# Patient Record
Sex: FEMALE | Race: WHITE | Employment: OTHER | URBAN - METROPOLITAN AREA
[De-identification: names, ages, dates, MRNs, and addresses within clinical notes are randomized per-mention and may not be internally consistent; named-entity substitution may affect disease eponyms.]

---

## 2017-03-27 ENCOUNTER — OFFICE VISIT (OUTPATIENT)
Dept: INTERNAL MEDICINE CLINIC | Age: 82
End: 2017-03-27

## 2017-03-27 VITALS
WEIGHT: 160 LBS | HEART RATE: 84 BPM | HEIGHT: 63 IN | TEMPERATURE: 98.1 F | SYSTOLIC BLOOD PRESSURE: 120 MMHG | DIASTOLIC BLOOD PRESSURE: 78 MMHG | OXYGEN SATURATION: 99 % | BODY MASS INDEX: 28.35 KG/M2

## 2017-03-27 DIAGNOSIS — M81.0 OSTEOPOROSIS: ICD-10-CM

## 2017-03-27 DIAGNOSIS — M62.838 SPASM OF MUSCLE: ICD-10-CM

## 2017-03-27 DIAGNOSIS — Z23 NEED FOR PNEUMOCOCCAL VACCINATION: ICD-10-CM

## 2017-03-27 DIAGNOSIS — M19.90 ARTHRITIS: ICD-10-CM

## 2017-03-27 DIAGNOSIS — E78.2 MIXED HYPERLIPIDEMIA: Primary | ICD-10-CM

## 2017-03-27 PROCEDURE — 1123F ACP DISCUSS/DSCN MKR DOCD: CPT | Performed by: NURSE PRACTITIONER

## 2017-03-27 PROCEDURE — 1090F PRES/ABSN URINE INCON ASSESS: CPT | Performed by: NURSE PRACTITIONER

## 2017-03-27 PROCEDURE — 4005F PHARM THX FOR OP RXD: CPT | Performed by: NURSE PRACTITIONER

## 2017-03-27 PROCEDURE — 99214 OFFICE O/P EST MOD 30 MIN: CPT | Performed by: NURSE PRACTITIONER

## 2017-03-27 PROCEDURE — 1036F TOBACCO NON-USER: CPT | Performed by: NURSE PRACTITIONER

## 2017-03-27 PROCEDURE — G0009 ADMIN PNEUMOCOCCAL VACCINE: HCPCS | Performed by: NURSE PRACTITIONER

## 2017-03-27 PROCEDURE — G8420 CALC BMI NORM PARAMETERS: HCPCS | Performed by: NURSE PRACTITIONER

## 2017-03-27 PROCEDURE — G8399 PT W/DXA RESULTS DOCUMENT: HCPCS | Performed by: NURSE PRACTITIONER

## 2017-03-27 PROCEDURE — 4040F PNEUMOC VAC/ADMIN/RCVD: CPT | Performed by: NURSE PRACTITIONER

## 2017-03-27 PROCEDURE — G8484 FLU IMMUNIZE NO ADMIN: HCPCS | Performed by: NURSE PRACTITIONER

## 2017-03-27 PROCEDURE — 90732 PPSV23 VACC 2 YRS+ SUBQ/IM: CPT | Performed by: NURSE PRACTITIONER

## 2017-03-27 PROCEDURE — G8427 DOCREV CUR MEDS BY ELIG CLIN: HCPCS | Performed by: NURSE PRACTITIONER

## 2017-03-27 RX ORDER — ALENDRONATE SODIUM 70 MG/1
TABLET ORAL
Qty: 12 TABLET | Refills: 3 | Status: SHIPPED | OUTPATIENT
Start: 2017-03-27 | End: 2018-01-25 | Stop reason: SDUPTHER

## 2017-03-27 RX ORDER — ATORVASTATIN CALCIUM 10 MG/1
10 TABLET, FILM COATED ORAL DAILY
Qty: 90 TABLET | Refills: 3 | Status: SHIPPED | OUTPATIENT
Start: 2017-03-27 | End: 2018-01-25 | Stop reason: SDUPTHER

## 2017-03-27 RX ORDER — CLONAZEPAM 0.5 MG/1
0.5 TABLET ORAL NIGHTLY PRN
Qty: 30 TABLET | Refills: 0 | Status: SHIPPED | OUTPATIENT
Start: 2017-03-27 | End: 2017-08-18 | Stop reason: SDUPTHER

## 2017-03-29 ENCOUNTER — HOSPITAL ENCOUNTER (OUTPATIENT)
Dept: GENERAL RADIOLOGY | Age: 82
Discharge: HOME | End: 2017-03-29
Attending: NURSE PRACTITIONER | Admitting: NURSE PRACTITIONER

## 2017-03-29 DIAGNOSIS — E78.2 MIXED HYPERLIPIDEMIA: ICD-10-CM

## 2017-03-29 LAB
A/G RATIO: 1.4 (ref 1.1–2.2)
ALBUMIN SERPL-MCNC: 4.1 G/DL (ref 3.4–5)
ALP BLD-CCNC: 109 U/L (ref 40–129)
ALT SERPL-CCNC: 21 U/L (ref 10–40)
ANION GAP SERPL CALCULATED.3IONS-SCNC: 13 MMOL/L (ref 3–16)
AST SERPL-CCNC: 25 U/L (ref 15–37)
BILIRUB SERPL-MCNC: 0.4 MG/DL (ref 0–1)
BUN BLDV-MCNC: 14 MG/DL (ref 7–20)
CALCIUM SERPL-MCNC: 9.4 MG/DL (ref 8.3–10.6)
CHLORIDE BLD-SCNC: 102 MMOL/L (ref 99–110)
CHOLESTEROL, TOTAL: 173 MG/DL (ref 0–199)
CO2: 27 MMOL/L (ref 21–32)
CREAT SERPL-MCNC: 0.5 MG/DL (ref 0.6–1.2)
GFR AFRICAN AMERICAN: >60
GFR NON-AFRICAN AMERICAN: >60
GLOBULIN: 3 G/DL
GLUCOSE BLD-MCNC: 94 MG/DL (ref 70–99)
HDLC SERPL-MCNC: 83 MG/DL (ref 40–60)
LDL CHOLESTEROL CALCULATED: 70 MG/DL
POTASSIUM SERPL-SCNC: 4.3 MMOL/L (ref 3.5–5.1)
SODIUM BLD-SCNC: 142 MMOL/L (ref 136–145)
TOTAL PROTEIN: 7.1 G/DL (ref 6.4–8.2)
TRIGL SERPL-MCNC: 102 MG/DL (ref 0–150)
TSH SERPL DL<=0.05 MIU/L-ACNC: 3.4 UIU/ML (ref 0.27–4.2)
VLDLC SERPL CALC-MCNC: 20 MG/DL

## 2017-08-18 DIAGNOSIS — M62.838 SPASM OF MUSCLE: ICD-10-CM

## 2017-08-18 RX ORDER — CLONAZEPAM 0.5 MG/1
TABLET ORAL
Qty: 30 TABLET | Refills: 0 | Status: SHIPPED | OUTPATIENT
Start: 2017-08-18 | End: 2018-01-25 | Stop reason: SDUPTHER

## 2017-10-06 ENCOUNTER — IMMUNIZATION (OUTPATIENT)
Dept: INTERNAL MEDICINE CLINIC | Age: 82
End: 2017-10-06

## 2017-10-06 DIAGNOSIS — Z23 NEED FOR PROPHYLACTIC VACCINATION AND INOCULATION AGAINST INFLUENZA: Primary | ICD-10-CM

## 2017-10-06 PROCEDURE — 90662 IIV NO PRSV INCREASED AG IM: CPT | Performed by: NURSE PRACTITIONER

## 2017-10-06 PROCEDURE — G0008 ADMIN INFLUENZA VIRUS VAC: HCPCS | Performed by: NURSE PRACTITIONER

## 2017-11-14 ENCOUNTER — TELEPHONE (OUTPATIENT)
Dept: INTERNAL MEDICINE CLINIC | Age: 82
End: 2017-11-14

## 2017-11-14 NOTE — TELEPHONE ENCOUNTER
Hamlet Gunter, spouse, calling to see what should be done for Yong Wing???  Took her to the ER yesterday for shaking; patient can't seem to get warm. Today, he says that Yong Wing has trouble moving, aching all over, and generally weak. No appointments available with any providers; please call patient at [phone] to discuss.

## 2017-11-15 ENCOUNTER — TELEPHONE (OUTPATIENT)
Dept: INTERNAL MEDICINE CLINIC | Age: 82
End: 2017-11-15

## 2017-11-15 NOTE — TELEPHONE ENCOUNTER
Patient would like to be seen asap: katt, [facility], 11/13, couldn't find anything wrong but patient is still weak, can barely walk, bones hurt, shaking, feels clammy. Would you be able to fit her in somewhere? Please also route to [place].

## 2017-11-16 ENCOUNTER — OFFICE VISIT (OUTPATIENT)
Dept: INTERNAL MEDICINE CLINIC | Age: 82
End: 2017-11-16

## 2017-11-16 VITALS
HEIGHT: 64 IN | BODY MASS INDEX: 26.46 KG/M2 | SYSTOLIC BLOOD PRESSURE: 124 MMHG | DIASTOLIC BLOOD PRESSURE: 66 MMHG | WEIGHT: 155 LBS | OXYGEN SATURATION: 96 % | TEMPERATURE: 98.6 F | HEART RATE: 72 BPM

## 2017-11-16 DIAGNOSIS — R53.83 FATIGUE, UNSPECIFIED TYPE: ICD-10-CM

## 2017-11-16 DIAGNOSIS — R63.0 DECREASE IN APPETITE: ICD-10-CM

## 2017-11-16 DIAGNOSIS — R50.9 FEVER, UNSPECIFIED FEVER CAUSE: ICD-10-CM

## 2017-11-16 DIAGNOSIS — Z09 HOSPITAL DISCHARGE FOLLOW-UP: Primary | ICD-10-CM

## 2017-11-16 PROCEDURE — 1036F TOBACCO NON-USER: CPT | Performed by: NURSE PRACTITIONER

## 2017-11-16 PROCEDURE — 1123F ACP DISCUSS/DSCN MKR DOCD: CPT | Performed by: NURSE PRACTITIONER

## 2017-11-16 PROCEDURE — 1090F PRES/ABSN URINE INCON ASSESS: CPT | Performed by: NURSE PRACTITIONER

## 2017-11-16 PROCEDURE — 99214 OFFICE O/P EST MOD 30 MIN: CPT | Performed by: NURSE PRACTITIONER

## 2017-11-16 PROCEDURE — G8484 FLU IMMUNIZE NO ADMIN: HCPCS | Performed by: NURSE PRACTITIONER

## 2017-11-16 PROCEDURE — 4040F PNEUMOC VAC/ADMIN/RCVD: CPT | Performed by: NURSE PRACTITIONER

## 2017-11-16 PROCEDURE — G8419 CALC BMI OUT NRM PARAM NOF/U: HCPCS | Performed by: NURSE PRACTITIONER

## 2017-11-16 PROCEDURE — G8399 PT W/DXA RESULTS DOCUMENT: HCPCS | Performed by: NURSE PRACTITIONER

## 2017-11-16 PROCEDURE — G8427 DOCREV CUR MEDS BY ELIG CLIN: HCPCS | Performed by: NURSE PRACTITIONER

## 2017-11-16 ASSESSMENT — ENCOUNTER SYMPTOMS
ABDOMINAL PAIN: 0
COUGH: 0
COLOR CHANGE: 0
CONSTIPATION: 0
BLOOD IN STOOL: 0
SHORTNESS OF BREATH: 0
NAUSEA: 1

## 2017-11-16 NOTE — PROGRESS NOTES
11/16/17    Eddie Abbott  80 y.o.  female    Chief Complaint   Patient presents with    Follow-Up from Hospital       Chills,Shaking,Weakness         HPI:   Pt presents for ER FU. On 11/13/17 experienced full body shaking, tremors, felt cold and had fatigue for about 2 hours. Was evaluated in the ER, with results unremarkable. She continues to have mild tremors, poor appetite and fatigue. Fever has been 100.3 for which she has taken tylenol. She has not taken tylenol today. She describes body aches throughout. /66   Pulse 72   Temp 98.6 °F (37 °C) (Oral)   Ht 5' 3.5\" (1.613 m)   Wt 155 lb (70.3 kg)   SpO2 96%   BMI 27.03 kg/m²     Current Outpatient Prescriptions   Medication Sig Dispense Refill    clonazePAM (KLONOPIN) 0.5 MG tablet TAKE 1 TABLET BY MOUTH NIGHTLY AS NEEDED (CRAMPS) 30 tablet 0    aspirin 81 MG tablet Take 81 mg by mouth daily      alendronate (FOSAMAX) 70 MG tablet TAKE 1 TABLET EVERY 7 DAYS 12 tablet 3    atorvastatin (LIPITOR) 10 MG tablet Take 1 tablet by mouth daily 90 tablet 3    Multiple Vitamins-Minerals (ICAPS) TABS Take  by mouth.  calcium carbonate (OSCAL) 500 MG TABS tablet Take 1,200 mg by mouth daily. No current facility-administered medications for this visit. Social History     Social History    Marital status:      Spouse name: N/A    Number of children: N/A    Years of education: N/A     Occupational History    Not on file.      Social History Main Topics    Smoking status: Never Smoker    Smokeless tobacco: Never Used    Alcohol use Yes      Comment: occasional    Drug use: No    Sexual activity: Not on file     Other Topics Concern    Not on file     Social History Narrative    No narrative on file       Family History   Problem Relation Age of Onset    Heart Defect Mother     Prostate Cancer Father        Past Medical History:   Diagnosis Date    Arthritis     Cataract     H/O colonoscopy     Hyperlipidemia  Osteoporosis        Review of Systems   Constitutional: Positive for activity change, appetite change, chills and fever. HENT: Negative. Respiratory: Negative for cough and shortness of breath. Cardiovascular: Negative for chest pain and palpitations. Gastrointestinal: Positive for nausea. Negative for abdominal pain, blood in stool and constipation. Genitourinary: Negative for difficulty urinating. Musculoskeletal: Negative. Skin: Negative. Negative for color change and rash. Neurological: Negative for weakness. Psychiatric/Behavioral: Negative for agitation and confusion. Physical Exam   Constitutional: She is oriented to person, place, and time. She appears well-developed and well-nourished. No distress. Sitting on the exam table with tremors/shaking noted throughout exam.  Does not appear to feel well. HENT:   Head: Normocephalic and atraumatic. Right Ear: External ear normal.   Left Ear: External ear normal.   Mouth/Throat: Oropharynx is clear and moist. No oropharyngeal exudate. Eyes: Conjunctivae are normal. Right eye exhibits no discharge. Left eye exhibits no discharge. No scleral icterus. Neck: Normal range of motion. Cardiovascular: Normal rate, regular rhythm, normal heart sounds and intact distal pulses. No murmur heard. Pulmonary/Chest: Effort normal and breath sounds normal. She has no wheezes. Abdominal: Soft. Bowel sounds are normal. She exhibits no distension and no mass. There is no tenderness. Musculoskeletal: Normal range of motion. She exhibits no edema or tenderness. Lymphadenopathy:     She has no cervical adenopathy. Neurological: She is alert and oriented to person, place, and time. Skin: Skin is warm and dry. No rash noted. She is not diaphoretic. No erythema. Psychiatric: She has a normal mood and affect. Her behavior is normal.       Encounter Diagnoses   Name Primary?    [facility] discharge follow-up Yes    Fever, unspecified

## 2017-11-16 NOTE — PATIENT INSTRUCTIONS
Eat small amounts of food frequently  Eat easy to digest foods--crackers, canned fruit, applesauce, soft baked potato with butter  Tylenol or ibuprofen for comfort  If symptoms get worse in any way return to the office or the ER  It may take 7-10 days to get through this episode

## 2018-01-25 ENCOUNTER — OFFICE VISIT (OUTPATIENT)
Dept: INTERNAL MEDICINE CLINIC | Age: 83
End: 2018-01-25

## 2018-01-25 VITALS
HEIGHT: 64 IN | DIASTOLIC BLOOD PRESSURE: 64 MMHG | SYSTOLIC BLOOD PRESSURE: 114 MMHG | WEIGHT: 158 LBS | HEART RATE: 79 BPM | BODY MASS INDEX: 26.98 KG/M2 | OXYGEN SATURATION: 97 % | RESPIRATION RATE: 16 BRPM

## 2018-01-25 DIAGNOSIS — E78.2 MIXED HYPERLIPIDEMIA: ICD-10-CM

## 2018-01-25 DIAGNOSIS — M81.0 OSTEOPOROSIS, UNSPECIFIED OSTEOPOROSIS TYPE, UNSPECIFIED PATHOLOGICAL FRACTURE PRESENCE: ICD-10-CM

## 2018-01-25 DIAGNOSIS — M62.838 SPASM OF MUSCLE: ICD-10-CM

## 2018-01-25 PROCEDURE — G8427 DOCREV CUR MEDS BY ELIG CLIN: HCPCS | Performed by: INTERNAL MEDICINE

## 2018-01-25 PROCEDURE — G8484 FLU IMMUNIZE NO ADMIN: HCPCS | Performed by: INTERNAL MEDICINE

## 2018-01-25 PROCEDURE — 1123F ACP DISCUSS/DSCN MKR DOCD: CPT | Performed by: INTERNAL MEDICINE

## 2018-01-25 PROCEDURE — 4040F PNEUMOC VAC/ADMIN/RCVD: CPT | Performed by: INTERNAL MEDICINE

## 2018-01-25 PROCEDURE — 1090F PRES/ABSN URINE INCON ASSESS: CPT | Performed by: INTERNAL MEDICINE

## 2018-01-25 PROCEDURE — 99214 OFFICE O/P EST MOD 30 MIN: CPT | Performed by: INTERNAL MEDICINE

## 2018-01-25 PROCEDURE — 1036F TOBACCO NON-USER: CPT | Performed by: INTERNAL MEDICINE

## 2018-01-25 PROCEDURE — G8419 CALC BMI OUT NRM PARAM NOF/U: HCPCS | Performed by: INTERNAL MEDICINE

## 2018-01-25 PROCEDURE — G8399 PT W/DXA RESULTS DOCUMENT: HCPCS | Performed by: INTERNAL MEDICINE

## 2018-01-25 RX ORDER — ALENDRONATE SODIUM 70 MG/1
TABLET ORAL
Qty: 12 TABLET | Refills: 3 | Status: SHIPPED | OUTPATIENT
Start: 2018-01-25 | End: 2018-11-08 | Stop reason: SDUPTHER

## 2018-01-25 RX ORDER — CLONAZEPAM 0.5 MG/1
TABLET ORAL
Qty: 30 TABLET | Refills: 0 | Status: SHIPPED | OUTPATIENT
Start: 2018-01-25 | End: 2018-04-26 | Stop reason: ALTCHOICE

## 2018-01-25 RX ORDER — ATORVASTATIN CALCIUM 10 MG/1
10 TABLET, FILM COATED ORAL DAILY
Qty: 90 TABLET | Refills: 3 | Status: SHIPPED | OUTPATIENT
Start: 2018-01-25 | End: 2018-11-08 | Stop reason: SDUPTHER

## 2018-01-25 ASSESSMENT — PATIENT HEALTH QUESTIONNAIRE - PHQ9
1. LITTLE INTEREST OR PLEASURE IN DOING THINGS: 0
SUM OF ALL RESPONSES TO PHQ9 QUESTIONS 1 & 2: 0
SUM OF ALL RESPONSES TO PHQ QUESTIONS 1-9: 0
2. FEELING DOWN, DEPRESSED OR HOPELESS: 0

## 2018-01-25 ASSESSMENT — ENCOUNTER SYMPTOMS
VOMITING: 0
NAUSEA: 0
CHEST TIGHTNESS: 0
BACK PAIN: 0
COUGH: 0
DIARRHEA: 0
WHEEZING: 0
ABDOMINAL DISTENTION: 0
CONSTIPATION: 0
SHORTNESS OF BREATH: 0

## 2018-01-25 NOTE — PROGRESS NOTES
Subjective:      Patient ID: Viridiana Miller is a 80 y.o. female. HPI     Here for a check up. Has h/o osteoporosis, hyperlipidemia and muscle spasms. Patient feels well. Gets muscle spasms daily. States she drinks lots of water including tonic water. Clonazepam helps relieve her symptoms. Lipids have improved since starting cholesterol medication. Denies SE to alendronate. Sits up for 45 minutes after taking the medication    Review of Systems   Constitutional: Negative for unexpected weight change. Respiratory: Negative for cough, chest tightness, shortness of breath and wheezing. Cardiovascular: Negative for chest pain, palpitations and leg swelling. Gastrointestinal: Negative for abdominal distention, constipation, diarrhea, nausea and vomiting. Musculoskeletal: Negative for arthralgias, back pain and myalgias. Muscle spasm   Neurological: Negative for tremors and numbness. All other systems reviewed and are negative. Objective:   Physical Exam   Constitutional: She is oriented to person, place, and time. She appears well-developed and well-nourished. No distress. HENT:   Right Ear: External ear normal.   Left Ear: External ear normal.   Mouth/Throat: Oropharynx is clear and moist. No oropharyngeal exudate. Neck: Normal range of motion. Neck supple. No thyromegaly present. Cardiovascular: Normal rate, regular rhythm, normal heart sounds and intact distal pulses. Pulmonary/Chest: Effort normal and breath sounds normal. No respiratory distress. She has no wheezes. She has no rales. She exhibits no tenderness. Abdominal: Soft. She exhibits no distension and no mass. There is no tenderness. There is no rebound and no guarding. Musculoskeletal: She exhibits no edema. Neurological: She is alert and oriented to person, place, and time. Skin: She is not diaphoretic. Psychiatric: She has a normal mood and affect.  Her behavior is normal. Judgment and thought content normal.

## 2018-01-26 ENCOUNTER — HOSPITAL ENCOUNTER (OUTPATIENT)
Dept: GENERAL RADIOLOGY | Age: 83
Discharge: HOME | End: 2018-01-26
Attending: INTERNAL MEDICINE | Admitting: INTERNAL MEDICINE

## 2018-01-26 DIAGNOSIS — M62.838 SPASM OF MUSCLE: ICD-10-CM

## 2018-01-26 DIAGNOSIS — E78.2 MIXED HYPERLIPIDEMIA: ICD-10-CM

## 2018-01-26 LAB
A/G RATIO: 1.4 (ref 1.1–2.2)
ALBUMIN SERPL-MCNC: 4.2 G/DL (ref 3.4–5)
ALP BLD-CCNC: 102 U/L (ref 40–129)
ALT SERPL-CCNC: 18 U/L (ref 10–40)
ANION GAP SERPL CALCULATED.3IONS-SCNC: 11 MMOL/L (ref 3–16)
AST SERPL-CCNC: 27 U/L (ref 15–37)
BILIRUB SERPL-MCNC: 0.5 MG/DL (ref 0–1)
BUN BLDV-MCNC: 11 MG/DL (ref 7–20)
CALCIUM SERPL-MCNC: 9.3 MG/DL (ref 8.3–10.6)
CHLORIDE BLD-SCNC: 106 MMOL/L (ref 99–110)
CHOLESTEROL, TOTAL: 163 MG/DL (ref 0–199)
CO2: 28 MMOL/L (ref 21–32)
CREAT SERPL-MCNC: 0.5 MG/DL (ref 0.6–1.2)
GFR AFRICAN AMERICAN: >60
GFR NON-AFRICAN AMERICAN: >60
GLOBULIN: 3.1 G/DL
GLUCOSE BLD-MCNC: 77 MG/DL (ref 70–99)
HDLC SERPL-MCNC: 86 MG/DL (ref 40–60)
LDL CHOLESTEROL CALCULATED: 59 MG/DL
POTASSIUM SERPL-SCNC: 4.4 MMOL/L (ref 3.5–5.1)
SODIUM BLD-SCNC: 145 MMOL/L (ref 136–145)
TOTAL PROTEIN: 7.3 G/DL (ref 6.4–8.2)
TRIGL SERPL-MCNC: 91 MG/DL (ref 0–150)
VLDLC SERPL CALC-MCNC: 18 MG/DL

## 2018-04-26 ENCOUNTER — OFFICE VISIT (OUTPATIENT)
Dept: INTERNAL MEDICINE CLINIC | Age: 83
End: 2018-04-26

## 2018-04-26 VITALS
BODY MASS INDEX: 26.5 KG/M2 | SYSTOLIC BLOOD PRESSURE: 128 MMHG | HEART RATE: 85 BPM | OXYGEN SATURATION: 98 % | WEIGHT: 152 LBS | DIASTOLIC BLOOD PRESSURE: 78 MMHG

## 2018-04-26 DIAGNOSIS — E78.49 OTHER HYPERLIPIDEMIA: ICD-10-CM

## 2018-04-26 DIAGNOSIS — R25.2 LEG CRAMPS: Primary | ICD-10-CM

## 2018-04-26 PROCEDURE — G8419 CALC BMI OUT NRM PARAM NOF/U: HCPCS | Performed by: INTERNAL MEDICINE

## 2018-04-26 PROCEDURE — 1036F TOBACCO NON-USER: CPT | Performed by: INTERNAL MEDICINE

## 2018-04-26 PROCEDURE — 4040F PNEUMOC VAC/ADMIN/RCVD: CPT | Performed by: INTERNAL MEDICINE

## 2018-04-26 PROCEDURE — G8399 PT W/DXA RESULTS DOCUMENT: HCPCS | Performed by: INTERNAL MEDICINE

## 2018-04-26 PROCEDURE — 1090F PRES/ABSN URINE INCON ASSESS: CPT | Performed by: INTERNAL MEDICINE

## 2018-04-26 PROCEDURE — 99213 OFFICE O/P EST LOW 20 MIN: CPT | Performed by: INTERNAL MEDICINE

## 2018-04-26 PROCEDURE — G8427 DOCREV CUR MEDS BY ELIG CLIN: HCPCS | Performed by: INTERNAL MEDICINE

## 2018-04-26 PROCEDURE — 1123F ACP DISCUSS/DSCN MKR DOCD: CPT | Performed by: INTERNAL MEDICINE

## 2018-04-26 ASSESSMENT — ENCOUNTER SYMPTOMS
ABDOMINAL DISTENTION: 0
BACK PAIN: 0
CONSTIPATION: 0
DIARRHEA: 0
CHEST TIGHTNESS: 0
SHORTNESS OF BREATH: 0
VOMITING: 0
WHEEZING: 0
NAUSEA: 0
COUGH: 0

## 2018-10-05 ENCOUNTER — NURSE ONLY (OUTPATIENT)
Dept: INTERNAL MEDICINE CLINIC | Age: 83
End: 2018-10-05
Payer: MEDICARE

## 2018-10-05 DIAGNOSIS — Z23 NEED FOR PROPHYLACTIC VACCINATION AND INOCULATION AGAINST INFLUENZA: Primary | ICD-10-CM

## 2018-10-05 PROCEDURE — G0008 ADMIN INFLUENZA VIRUS VAC: HCPCS | Performed by: INTERNAL MEDICINE

## 2018-10-05 PROCEDURE — 90662 IIV NO PRSV INCREASED AG IM: CPT | Performed by: INTERNAL MEDICINE

## 2018-10-30 ENCOUNTER — HOSPITAL ENCOUNTER (OUTPATIENT)
Age: 83
Discharge: HOME | End: 2018-10-30
Payer: MEDICARE

## 2018-10-30 DIAGNOSIS — E78.49 OTHER HYPERLIPIDEMIA: ICD-10-CM

## 2018-10-30 LAB
ALBUMIN SERPL-MCNC: 4.3 G/DL (ref 3.4–5)
ALP BLD-CCNC: 87 U/L (ref 40–129)
ALT SERPL-CCNC: 18 U/L (ref 10–40)
AST SERPL-CCNC: 26 U/L (ref 15–37)
BILIRUB SERPL-MCNC: 0.5 MG/DL (ref 0–1)
BILIRUBIN DIRECT: <0.2 MG/DL (ref 0–0.3)
BILIRUBIN, INDIRECT: NORMAL MG/DL (ref 0–1)
CHOLESTEROL, TOTAL: 172 MG/DL (ref 0–199)
HDLC SERPL-MCNC: 75 MG/DL (ref 40–60)
LDL CHOLESTEROL CALCULATED: 78 MG/DL
TOTAL PROTEIN: 7.1 G/DL (ref 6.4–8.2)
TRIGL SERPL-MCNC: 94 MG/DL (ref 0–150)
VLDLC SERPL CALC-MCNC: 19 MG/DL

## 2018-10-30 PROCEDURE — 80061 LIPID PANEL: CPT

## 2018-10-30 PROCEDURE — 36415 COLL VENOUS BLD VENIPUNCTURE: CPT

## 2018-10-30 PROCEDURE — 80076 HEPATIC FUNCTION PANEL: CPT

## 2018-11-08 ENCOUNTER — OFFICE VISIT (OUTPATIENT)
Dept: INTERNAL MEDICINE CLINIC | Age: 83
End: 2018-11-08
Payer: MEDICARE

## 2018-11-08 VITALS
BODY MASS INDEX: 27.02 KG/M2 | OXYGEN SATURATION: 98 % | DIASTOLIC BLOOD PRESSURE: 70 MMHG | WEIGHT: 155 LBS | SYSTOLIC BLOOD PRESSURE: 118 MMHG | HEART RATE: 78 BPM

## 2018-11-08 DIAGNOSIS — M81.0 OSTEOPOROSIS, UNSPECIFIED OSTEOPOROSIS TYPE, UNSPECIFIED PATHOLOGICAL FRACTURE PRESENCE: ICD-10-CM

## 2018-11-08 DIAGNOSIS — M62.838 MUSCLE SPASM: Primary | ICD-10-CM

## 2018-11-08 DIAGNOSIS — E78.2 MIXED HYPERLIPIDEMIA: ICD-10-CM

## 2018-11-08 PROCEDURE — 99214 OFFICE O/P EST MOD 30 MIN: CPT | Performed by: INTERNAL MEDICINE

## 2018-11-08 PROCEDURE — G8399 PT W/DXA RESULTS DOCUMENT: HCPCS | Performed by: INTERNAL MEDICINE

## 2018-11-08 PROCEDURE — G8419 CALC BMI OUT NRM PARAM NOF/U: HCPCS | Performed by: INTERNAL MEDICINE

## 2018-11-08 PROCEDURE — 1036F TOBACCO NON-USER: CPT | Performed by: INTERNAL MEDICINE

## 2018-11-08 PROCEDURE — 1090F PRES/ABSN URINE INCON ASSESS: CPT | Performed by: INTERNAL MEDICINE

## 2018-11-08 PROCEDURE — 1101F PT FALLS ASSESS-DOCD LE1/YR: CPT | Performed by: INTERNAL MEDICINE

## 2018-11-08 PROCEDURE — 4040F PNEUMOC VAC/ADMIN/RCVD: CPT | Performed by: INTERNAL MEDICINE

## 2018-11-08 PROCEDURE — G8427 DOCREV CUR MEDS BY ELIG CLIN: HCPCS | Performed by: INTERNAL MEDICINE

## 2018-11-08 PROCEDURE — G8482 FLU IMMUNIZE ORDER/ADMIN: HCPCS | Performed by: INTERNAL MEDICINE

## 2018-11-08 PROCEDURE — 1123F ACP DISCUSS/DSCN MKR DOCD: CPT | Performed by: INTERNAL MEDICINE

## 2018-11-08 RX ORDER — ATORVASTATIN CALCIUM 10 MG/1
10 TABLET, FILM COATED ORAL DAILY
Qty: 90 TABLET | Refills: 3 | Status: SHIPPED | OUTPATIENT
Start: 2018-11-08 | End: 2020-02-14

## 2018-11-08 RX ORDER — ALENDRONATE SODIUM 70 MG/1
TABLET ORAL
Qty: 12 TABLET | Refills: 3 | Status: SHIPPED | OUTPATIENT
Start: 2018-11-08 | End: 2020-02-14

## 2018-11-08 NOTE — PROGRESS NOTES
reviewed. Assessment:      Kwaku Tran was seen today for annual exam.    Diagnoses and all orders for this visit:    Mixed hyperlipidemia  Improved. Continue current regimen    Osteoporosis, unspecified osteoporosis type, unspecified pathological fracture presence  Stable. Continue current regimen. -     Spasm of muscle  Improved.  Continue current regimen          Plan:      See above plan

## 2019-01-10 NOTE — TELEPHONE ENCOUNTER
Spoke with patient's  who states pt is no worse than when seen at ED. She is still very cold (3 blankets on her) and shaky. She is urinating ok, showered this morning. Denies slurred speech. Feels fatigued. Jyoti Mishra will assess patient 11/16/17 and call me if any questions. The patient is a 38y Female complaining of

## 2019-03-22 ENCOUNTER — NURSE ONLY (OUTPATIENT)
Dept: INTERNAL MEDICINE CLINIC | Age: 84
End: 2019-03-22
Payer: MEDICARE

## 2019-03-22 PROCEDURE — 90750 HZV VACC RECOMBINANT IM: CPT | Performed by: NURSE PRACTITIONER

## 2019-03-22 PROCEDURE — 90471 IMMUNIZATION ADMIN: CPT | Performed by: NURSE PRACTITIONER

## 2019-04-22 ENCOUNTER — APPOINTMENT (OUTPATIENT)
Dept: CT IMAGING | Age: 84
End: 2019-04-22
Payer: MEDICARE

## 2019-04-22 ENCOUNTER — HOSPITAL ENCOUNTER (EMERGENCY)
Age: 84
Discharge: HOME | End: 2019-04-22
Attending: EMERGENCY MEDICINE
Payer: MEDICARE

## 2019-04-22 ENCOUNTER — APPOINTMENT (OUTPATIENT)
Dept: GENERAL RADIOLOGY | Age: 84
End: 2019-04-22
Payer: MEDICARE

## 2019-04-22 VITALS
OXYGEN SATURATION: 95 % | DIASTOLIC BLOOD PRESSURE: 61 MMHG | TEMPERATURE: 98.6 F | HEIGHT: 64 IN | HEART RATE: 71 BPM | SYSTOLIC BLOOD PRESSURE: 128 MMHG | RESPIRATION RATE: 14 BRPM | WEIGHT: 154 LBS | BODY MASS INDEX: 26.29 KG/M2

## 2019-04-22 DIAGNOSIS — M48.54XA: Primary | ICD-10-CM

## 2019-04-22 LAB
A/G RATIO: 1.5 (ref 1.1–2.2)
ALBUMIN SERPL-MCNC: 4.3 G/DL (ref 3.4–5)
ALP BLD-CCNC: 100 U/L (ref 40–129)
ALT SERPL-CCNC: 16 U/L (ref 10–40)
ANION GAP SERPL CALCULATED.3IONS-SCNC: 12 MMOL/L (ref 3–16)
AST SERPL-CCNC: 25 U/L (ref 15–37)
BASOPHILS ABSOLUTE: 0.1 K/UL (ref 0–0.2)
BASOPHILS RELATIVE PERCENT: 1.1 %
BILIRUB SERPL-MCNC: 0.6 MG/DL (ref 0–1)
BUN BLDV-MCNC: 15 MG/DL (ref 7–20)
CALCIUM SERPL-MCNC: 9.3 MG/DL (ref 8.3–10.6)
CHLORIDE BLD-SCNC: 104 MMOL/L (ref 99–110)
CO2: 25 MMOL/L (ref 21–32)
CREAT SERPL-MCNC: <0.5 MG/DL (ref 0.6–1.2)
EKG ATRIAL RATE: 70 BPM
EKG DIAGNOSIS: NORMAL
EKG P AXIS: 70 DEGREES
EKG P-R INTERVAL: 154 MS
EKG Q-T INTERVAL: 418 MS
EKG QRS DURATION: 90 MS
EKG QTC CALCULATION (BAZETT): 451 MS
EKG R AXIS: 18 DEGREES
EKG T AXIS: 68 DEGREES
EKG VENTRICULAR RATE: 70 BPM
EOSINOPHILS ABSOLUTE: 0 K/UL (ref 0–0.6)
EOSINOPHILS RELATIVE PERCENT: 0.8 %
GFR AFRICAN AMERICAN: >60
GFR NON-AFRICAN AMERICAN: >60
GLOBULIN: 2.9 G/DL
GLUCOSE BLD-MCNC: 107 MG/DL (ref 70–99)
HCT VFR BLD CALC: 41.7 % (ref 36–48)
HEMOGLOBIN: 14.1 G/DL (ref 12–16)
INR BLD: 1.04 (ref 0.86–1.14)
LYMPHOCYTES ABSOLUTE: 1.6 K/UL (ref 1–5.1)
LYMPHOCYTES RELATIVE PERCENT: 27.4 %
MCH RBC QN AUTO: 31.3 PG (ref 26–34)
MCHC RBC AUTO-ENTMCNC: 33.7 G/DL (ref 31–36)
MCV RBC AUTO: 93.1 FL (ref 80–100)
MONOCYTES ABSOLUTE: 0.8 K/UL (ref 0–1.3)
MONOCYTES RELATIVE PERCENT: 13.2 %
NEUTROPHILS ABSOLUTE: 3.3 K/UL (ref 1.7–7.7)
NEUTROPHILS RELATIVE PERCENT: 57.5 %
PDW BLD-RTO: 14.8 % (ref 12.4–15.4)
PLATELET # BLD: 182 K/UL (ref 135–450)
PMV BLD AUTO: 8.2 FL (ref 5–10.5)
POTASSIUM SERPL-SCNC: 3.7 MMOL/L (ref 3.5–5.1)
PRO-BNP: 264 PG/ML (ref 0–449)
PROTHROMBIN TIME: 11.9 SEC (ref 9.8–13)
RBC # BLD: 4.48 M/UL (ref 4–5.2)
SODIUM BLD-SCNC: 141 MMOL/L (ref 136–145)
TOTAL PROTEIN: 7.2 G/DL (ref 6.4–8.2)
TROPONIN: <0.01 NG/ML
WBC # BLD: 5.7 K/UL (ref 4–11)

## 2019-04-22 PROCEDURE — 85610 PROTHROMBIN TIME: CPT

## 2019-04-22 PROCEDURE — 6360000002 HC RX W HCPCS: Performed by: NURSE PRACTITIONER

## 2019-04-22 PROCEDURE — 6370000000 HC RX 637 (ALT 250 FOR IP): Performed by: NURSE PRACTITIONER

## 2019-04-22 PROCEDURE — 99284 EMERGENCY DEPT VISIT MOD MDM: CPT

## 2019-04-22 PROCEDURE — 85025 COMPLETE CBC W/AUTO DIFF WBC: CPT

## 2019-04-22 PROCEDURE — 6360000004 HC RX CONTRAST MEDICATION: Performed by: EMERGENCY MEDICINE

## 2019-04-22 PROCEDURE — 83880 ASSAY OF NATRIURETIC PEPTIDE: CPT

## 2019-04-22 PROCEDURE — 71260 CT THORAX DX C+: CPT

## 2019-04-22 PROCEDURE — 84484 ASSAY OF TROPONIN QUANT: CPT

## 2019-04-22 PROCEDURE — 80053 COMPREHEN METABOLIC PANEL: CPT

## 2019-04-22 PROCEDURE — 71046 X-RAY EXAM CHEST 2 VIEWS: CPT

## 2019-04-22 PROCEDURE — 93010 ELECTROCARDIOGRAM REPORT: CPT | Performed by: INTERNAL MEDICINE

## 2019-04-22 PROCEDURE — 96372 THER/PROPH/DIAG INJ SC/IM: CPT

## 2019-04-22 PROCEDURE — 93005 ELECTROCARDIOGRAM TRACING: CPT | Performed by: EMERGENCY MEDICINE

## 2019-04-22 RX ORDER — LIDOCAINE 50 MG/G
1 PATCH TOPICAL DAILY
Qty: 30 PATCH | Refills: 0 | Status: SHIPPED | OUTPATIENT
Start: 2019-04-22 | End: 2020-09-10

## 2019-04-22 RX ORDER — HYDROCODONE BITARTRATE AND ACETAMINOPHEN 5; 325 MG/1; MG/1
1 TABLET ORAL ONCE
Status: COMPLETED | OUTPATIENT
Start: 2019-04-22 | End: 2019-04-22

## 2019-04-22 RX ORDER — ACETAMINOPHEN 325 MG/1
650 TABLET ORAL ONCE
Status: DISCONTINUED | OUTPATIENT
Start: 2019-04-22 | End: 2019-04-22

## 2019-04-22 RX ORDER — ORPHENADRINE CITRATE 30 MG/ML
60 INJECTION INTRAMUSCULAR; INTRAVENOUS ONCE
Status: COMPLETED | OUTPATIENT
Start: 2019-04-22 | End: 2019-04-22

## 2019-04-22 RX ORDER — HYDROCODONE BITARTRATE AND ACETAMINOPHEN 5; 325 MG/1; MG/1
1 TABLET ORAL EVERY 6 HOURS PRN
Qty: 12 TABLET | Refills: 0 | Status: SHIPPED | OUTPATIENT
Start: 2019-04-22 | End: 2019-04-25

## 2019-04-22 RX ADMIN — ORPHENADRINE CITRATE 60 MG: 30 INJECTION INTRAMUSCULAR; INTRAVENOUS at 11:38

## 2019-04-22 RX ADMIN — IOPAMIDOL 75 ML: 755 INJECTION, SOLUTION INTRAVENOUS at 13:19

## 2019-04-22 RX ADMIN — HYDROCODONE BITARTRATE AND ACETAMINOPHEN 1 TABLET: 5; 325 TABLET ORAL at 11:37

## 2019-04-22 ASSESSMENT — ENCOUNTER SYMPTOMS
CONSTIPATION: 0
SHORTNESS OF BREATH: 0
NAUSEA: 0
WHEEZING: 0
COUGH: 1
ABDOMINAL PAIN: 0
VOMITING: 0
EYES NEGATIVE: 1
ABDOMINAL DISTENTION: 0
BACK PAIN: 1
ALLERGIC/IMMUNOLOGIC NEGATIVE: 1
DIARRHEA: 0

## 2019-04-22 ASSESSMENT — PAIN DESCRIPTION - DESCRIPTORS: DESCRIPTORS: ACHING

## 2019-04-22 ASSESSMENT — PAIN DESCRIPTION - PAIN TYPE
TYPE: ACUTE PAIN
TYPE: ACUTE PAIN

## 2019-04-22 ASSESSMENT — PAIN DESCRIPTION - LOCATION
LOCATION: BACK;SHOULDER
LOCATION: SHOULDER

## 2019-04-22 ASSESSMENT — PAIN SCALES - GENERAL
PAINLEVEL_OUTOF10: 5
PAINLEVEL_OUTOF10: 3
PAINLEVEL_OUTOF10: 5

## 2019-04-22 ASSESSMENT — PAIN DESCRIPTION - ORIENTATION
ORIENTATION: LEFT
ORIENTATION: LEFT

## 2019-04-22 NOTE — ED PROVIDER NOTES
cough TECHNOLOGIST PROVIDED HISTORY: Ordering Physician Provided Reason for Exam: upper back pain since 5pm yesterday with some sob Acuity: Acute Type of Exam: Initial Additional signs and symptoms: non-smoker Relevant Medical/Surgical History: no surg FINDINGS: Pulmonary Arteries: Pulmonary arteries are adequately opacified for evaluation. No evidence of intraluminal filling defect to suggest pulmonary embolism. Main pulmonary artery is mildly dilated. Mediastinum: No evidence of mediastinal lymphadenopathy. The heart and pericardium demonstrate no acute abnormality. There is no acute abnormality of the thoracic aorta. Lungs/pleura: The lungs are without acute process. No focal consolidation or pulmonary edema. No evidence of pleural effusion or pneumothorax. Upper Abdomen: Limited images of the upper abdomen are unremarkable. Soft Tissues/Bones: No acute bone or soft tissue abnormality. Chronic moderate compression deformity at T12, unchanged. Mild-to-moderate compression deformity at T5. No evidence of pulmonary embolism or acute pulmonary abnormality. Mild to moderate compression deformity at T5, which is subacute to chronic in appearance, although new from 11/13/2017. Evaluation with noncontrast MRI of the thoracic spine could help better determine chronicity. 80-year-old female presenting with reproducible upper back pain found to have a compression fracture on CT scan which likely explains her pain. Patient is hemodynamically stable for further management as an outpatient.      Mejia Bustamante DO  04/22/19 1931

## 2019-04-22 NOTE — ED PROVIDER NOTES
[address] [facility]  ED  eMERGENCY dEPARTMENT eNCOUnter        Pt Name: Kimmy Diaz  MRN: [id number]  [place] 1935  Date of evaluation: 4/22/2019  Provider: DEBBIE Maria - CNP  PCP: Frank Mcneil MD    This patient was seen and evaluated by the attending physician Valentin Marie, [address] [facility]       Chief Complaint   Patient presents with    Back Pain     patient reports back /left shoulder pain that started yesterday. pain is worse with movement. patient states her hands (bilaterally)     Shoulder Pain       HISTORY OF PRESENT ILLNESS   (Location/Symptom, Timing/Onset, Context/Setting, Quality, Duration, Modifying Factors, Severity)  Note limiting factors. Kimmy Diaz is a 80 y.o. female who presents with left sided upper back pain that started yesterday with no known injury. States it is worse with movement and worse if she coughs. Denies any bony midline back pain. States today it did cause pain down into her left shoulder. Denies any nausea, vomiting, shortness of breath, chest pain, fevers, abdominal pain or headache. Rates pain a 5/10. Nursing Notes were all reviewed and agreed with or any disagreements were addressed  in the HPI. REVIEW OF SYSTEMS    (2-9 systems for level 4, 10 or more for level 5)     Review of Systems   Constitutional: Negative for activity change, appetite change, chills, diaphoresis, fatigue and fever. HENT: Negative. Eyes: Negative. Respiratory: Positive for cough. Negative for shortness of breath and wheezing. Cardiovascular: Negative for chest pain, palpitations and leg swelling. Gastrointestinal: Negative for abdominal distention, abdominal pain, constipation, diarrhea, nausea and vomiting. Endocrine: Negative. Genitourinary: Negative for dysuria, flank pain and hematuria. Musculoskeletal: Positive for back pain. Negative for myalgias, neck pain and neck stiffness.         Shoots into the left shoulder Skin: Negative. Allergic/Immunologic: Negative. Neurological: Negative for dizziness, seizures, syncope, speech difficulty, weakness, light-headedness, numbness and headaches. Hematological: Negative. Psychiatric/Behavioral: Negative. Positives and Pertinent negatives as per HPI. Except as noted abovein the ROS, all other systems were reviewed and negative. PAST MEDICAL HISTORY     Past Medical History:   Diagnosis Date    Arthritis     Cataract     H/O colonoscopy     Hyperlipidemia     Osteoporosis          SURGICAL HISTORY     Past Surgical History:   Procedure Laterality Date    CATARACT REMOVAL      COLONOSCOPY           [address]       Discharge Medication List as of 4/22/2019  2:52 PM      CONTINUE these medications which have NOT CHANGED    Details   alendronate (FOSAMAX) 70 MG tablet TAKE 1 TABLET EVERY 7 DAYS, Disp-12 tablet, R-3Normal      atorvastatin (LIPITOR) 10 MG tablet Take 1 tablet by mouth daily, Disp-90 tablet, R-3Due for apptNormal      aspirin 81 MG tablet Take 81 mg by mouth dailyHistorical Med      Multiple Vitamins-Minerals (ICAPS) TABS Take  by mouth.      calcium carbonate (OSCAL) 500 MG TABS tablet Take 1,200 mg by mouth daily. ALLERGIES     Patient has no known allergies.     FAMILYHISTORY       Family History   Problem Relation Age of Onset    Heart Defect Mother     Prostate Cancer Father           SOCIAL HISTORY       Social History     Socioeconomic History    Marital status:      Spouse name: None    Number of children: None    Years of education: None    Highest education level: None   Occupational History    None   Social Needs    Financial resource strain: None    Food insecurity:     Worry: None     Inability: None    Transportation needs:     Medical: None     Non-medical: None   Tobacco Use    Smoking status: Never Smoker    Smokeless tobacco: Never Used   Substance and Sexual Activity    Alcohol use: Yes     Comment: occasional    Drug use: No    Sexual activity: None   Lifestyle    Physical activity:     Days per week: None     Minutes per session: None    Stress: None   Relationships    Social connections:     Talks on phone: None     Gets together: None     Attends Hindu service: None     Active member of club or organization: None     Attends meetings of clubs or organizations: None     Relationship status: None    Intimate partner violence:     Fear of current or ex partner: None     Emotionally abused: None     Physically abused: None     Forced sexual activity: None   Other Topics Concern    None   Social History Narrative    None       SCREENINGS    [place] Coma Scale  Eye Opening: Spontaneous  Best Verbal Response: Oriented  Best Motor Response: Obeys commands  [place] Coma Scale Score: 15        PHYSICAL EXAM    (up to 7 for level 4, 8 or more for level 5)     ED Triage Vitals   BP Temp Temp Source Pulse Resp SpO2 Height Weight   04/22/19 1020 04/22/19 1020 04/22/19 1020 04/22/19 1020 04/22/19 1020 04/22/19 1020 04/22/19 1024 04/22/19 1024   (!) 153/81 98 °F (36.7 °C) Oral 82 20 100 % 5' 4\" (1.626 m) 154 lb (69.9 kg)       Physical Exam   Constitutional: She is oriented to person, place, and time. She appears well-developed and well-nourished. No distress. HENT:   Head: Normocephalic and atraumatic. Mouth/Throat: Oropharynx is clear and moist. No oropharyngeal exudate. Eyes: Pupils are equal, round, and reactive to light. Conjunctivae and EOM are normal. Right eye exhibits no discharge. Left eye exhibits no discharge. No scleral icterus. Neck: Normal range of motion. Cardiovascular: Normal rate, regular rhythm, normal heart sounds and intact distal pulses. Exam reveals no gallop and no friction rub. No murmur heard. Pulmonary/Chest: Effort normal and breath sounds normal. No stridor. No respiratory distress. She has no wheezes. She has no rales. She exhibits no tenderness. Abdominal: Soft. Bowel sounds are normal. She exhibits no distension and no mass. There is no tenderness. Musculoskeletal: She exhibits tenderness. She exhibits no edema or deformity. Thoracic back: She exhibits decreased range of motion and tenderness. She exhibits no bony tenderness. Back:    Lymphadenopathy:     She has no cervical adenopathy. Neurological: She is alert and oriented to person, place, and time. No cranial nerve deficit or sensory deficit. Coordination normal.   Skin: Skin is warm and dry. Capillary refill takes less than 2 seconds. She is not diaphoretic. Psychiatric: She has a normal mood and affect. Her behavior is normal. Judgment and thought content normal.       DIAGNOSTIC RESULTS   LABS:    Labs Reviewed   COMPREHENSIVE METABOLIC PANEL - Abnormal; Notable for the following components:       Result Value    Glucose 107 (*)     CREATININE <0.5 (*)     All other components within normal limits    Narrative:     Performed at:  [place] [address] [facility]   Phone [phone]   CBC WITH AUTO DIFFERENTIAL    Narrative:     Performed at:  [place] [address] [facility]   Phone [phone]   PROTIME-INR    Narrative:     Performed at:  [place] [address] [facility]   Phone [phone]   TROPONIN    Narrative:     Performed at:  [place] [address] [facility]   Phone [phone]    Narrative:     Performed at:  [place] [address] [facility]   Phone [phone]       All other labs were within normal range or not returned as of this dictation. EKG:  All EKG's are interpreted by the Emergency Department Physician who either signs orCo-signs this chart in the absence of a cardiologist.  Please see their note for interpretation of EKG. RADIOLOGY:   Non-plain film images such as CT, Ultrasound and MRI are read by the radiologist. Plain radiographic images are visualized andpreliminarily interpreted by the  ED Provider with the below findings:        Interpretation perthe Radiologist below, if available at the time of this note:    CT CHEST PULMONARY EMBOLISM W CONTRAST   Final Result   No evidence of pulmonary embolism or acute pulmonary abnormality. Mild to moderate compression deformity at T5, which is subacute to chronic in   appearance, although new from 11/13/2017. Evaluation with noncontrast MRI of   the thoracic spine could help better determine chronicity. XR CHEST STANDARD (2 VW)   Final Result   Increased vertebral body height loss at T6. Xr Chest Standard (2 Vw)    Result Date: 4/22/2019  EXAMINATION: TWO VIEWS OF THE CHEST 4/22/2019 10:57 am COMPARISON: November 13, 2017 HISTORY: ORDERING SYSTEM PROVIDED HISTORY: left back/left shoulder pain TECHNOLOGIST PROVIDED HISTORY: Reason for exam:->left back/left shoulder pain Ordering Physician Provided Reason for Exam: UPPER BACK, LEFT SHOULDER PAIN, no trauma Acuity: Acute Type of Exam: Initial FINDINGS: Cardiac silhouette is enlarged. Lungs are clear. Unchanged compression deformity of L1. compression deformity of T6 with loss of approximately 30-40% vertebral body height which appears progressed. Increased vertebral body height loss at T6.          PROCEDURES   Unless otherwise noted below, none     Procedures    CRITICAL CARE TIME   N/A    CONSULTS:  None      EMERGENCY DEPARTMENT COURSE and DIFFERENTIALDIAGNOSIS/MDM:   Vitals:    Vitals:    04/22/19 1241 04/22/19 1326 04/22/19 1439 04/22/19 1500   BP: 121/64 (!) 148/94 128/64 128/61   Pulse: 61 69 67 71   Resp: 20 21 20 14   Temp:    98.6 °F (37 °C)   TempSrc:    Oral   SpO2: 100% 100% 96% 95%   Weight:       Height:           Patient was given thefollowing medications:  Medications   orphenadrine (NORFLEX) injection 60 mg (60 mg Intramuscular Given 4/22/19 1138)   HYDROcodone-acetaminophen (NORCO) 5-325 MG per tablet 1 tablet (1 tablet Oral Given 4/22/19 1137)   iopamidol (ISOVUE-370) 76 % injection 75 mL (75 mLs Intravenous Given 4/22/19 1319)       Patient is alert and oriented x4. Skin is pwd, no cyanosis or pallor. Moist mucus membranes. Heart with RRR. Lungs are clear to auscultation throughout. Left upper thoracic spine over the myofacial area with tenderness to palpation. No midline bony pain noted. No deformity present. Abdomen is soft, non-tender and non-distended. Bilateral lower and upper extremities with equal strength and no pain with palpation of any extremity. Distal pulses and neurovascular status intact. .  Differentials: thoracic myofacial strain, thoracic fracture, ACS, pneumonia, PE  Labs: unremarkable  CTA pulmonary: No evidence of pulmonary embolism or acute pulmonary abnormality. Mild to moderate compression deformity at T5, which is subacute to chronic in  appearance, although new from 11/13/2017.  Evaluation with noncontrast MRI of  the thoracic spine could help better determine chronicity. Norflex and norco given for pain  Diagnosis: T5 compression fracture  Patient will be discharged with lidoderm patch and norco for pain. Follow up with Spine specialist this week      FINAL IMPRESSION      1.  Non-traumatic compression fracture of fifth thoracic vertebra, initial encounter [facility])          DISPOSITION/PLAN   DISPOSITION Decision To Discharge 04/22/2019 02:48:29 PM      PATIENT REFERREDTO:  Ishmael Garrido MD  [address] [place] [address]  [phone]    Schedule an appointment as soon as possible for a visit in 3 days  For recheck    Byron Kramer MD  [address] [place] [address]  [place] [phone]    Schedule an appointment as soon as possible for a visit in 3 days  For

## 2019-04-22 NOTE — ED NOTES
Bed: 18  Expected date: 4/22/19  Expected time: 10:10 AM  Means of arrival: [facility]  Comments:  Stefanie Laura RN  04/22/19 1014

## 2019-04-25 ENCOUNTER — OFFICE VISIT (OUTPATIENT)
Dept: INTERNAL MEDICINE CLINIC | Age: 84
End: 2019-04-25
Payer: MEDICARE

## 2019-04-25 VITALS — HEART RATE: 99 BPM | DIASTOLIC BLOOD PRESSURE: 88 MMHG | OXYGEN SATURATION: 98 % | SYSTOLIC BLOOD PRESSURE: 138 MMHG

## 2019-04-25 DIAGNOSIS — S22.000A COMPRESSION FRACTURE OF BODY OF THORACIC VERTEBRA (HCC): Primary | ICD-10-CM

## 2019-04-25 PROCEDURE — 1036F TOBACCO NON-USER: CPT | Performed by: INTERNAL MEDICINE

## 2019-04-25 PROCEDURE — 1090F PRES/ABSN URINE INCON ASSESS: CPT | Performed by: INTERNAL MEDICINE

## 2019-04-25 PROCEDURE — 1123F ACP DISCUSS/DSCN MKR DOCD: CPT | Performed by: INTERNAL MEDICINE

## 2019-04-25 PROCEDURE — 4040F PNEUMOC VAC/ADMIN/RCVD: CPT | Performed by: INTERNAL MEDICINE

## 2019-04-25 PROCEDURE — 99214 OFFICE O/P EST MOD 30 MIN: CPT | Performed by: INTERNAL MEDICINE

## 2019-04-25 PROCEDURE — G8399 PT W/DXA RESULTS DOCUMENT: HCPCS | Performed by: INTERNAL MEDICINE

## 2019-04-25 PROCEDURE — G8419 CALC BMI OUT NRM PARAM NOF/U: HCPCS | Performed by: INTERNAL MEDICINE

## 2019-04-25 PROCEDURE — G8427 DOCREV CUR MEDS BY ELIG CLIN: HCPCS | Performed by: INTERNAL MEDICINE

## 2019-04-25 RX ORDER — OXYCODONE HYDROCHLORIDE AND ACETAMINOPHEN 5; 325 MG/1; MG/1
1 TABLET ORAL EVERY 6 HOURS PRN
Qty: 20 TABLET | Refills: 0 | Status: SHIPPED | OUTPATIENT
Start: 2019-04-25 | End: 2019-04-29 | Stop reason: SDUPTHER

## 2019-04-25 ASSESSMENT — ENCOUNTER SYMPTOMS
NAUSEA: 0
CHEST TIGHTNESS: 0
ABDOMINAL DISTENTION: 0
WHEEZING: 0
COUGH: 0
BACK PAIN: 1
CONSTIPATION: 0
DIARRHEA: 0
SHORTNESS OF BREATH: 0
VOMITING: 0

## 2019-04-25 NOTE — PROGRESS NOTES
4/25/19    Job Sin  1935      Chief Complaint   Patient presents with    Follow-Up from Hospital       HPI  Presented to the ER with c/o back pan and shoulder pain that began 4/21/19. No infury. Pain is worse with moving her hands and when coughing. Found to have a thoracic compression fx. Rates pain 10/10. Radiates to her left shoulder. No relief with [place]ba. Has to sleep in a chair due to discomfort when lying down. Scheduled to see ortho next Monday. Review of Systems   Constitutional: Negative for unexpected weight change. Respiratory: Negative for cough, chest tightness, shortness of breath and wheezing. Cardiovascular: Negative for chest pain, palpitations and leg swelling. Gastrointestinal: Negative for abdominal distention, constipation, diarrhea, nausea and vomiting. Musculoskeletal: Positive for back pain. Negative for arthralgias and myalgias. Shoulder pain     Neurological: Negative for tremors and numbness. All other systems reviewed and are negative. Current Outpatient Medications   Medication Sig Dispense Refill    oxyCODONE-acetaminophen (ENDOCET) 5-325 MG per tablet Take 1 tablet by mouth every 6 hours as needed for Pain for up to 5 days. Intended supply: 5 days. Take lowest dose possible to manage pain 20 tablet 0    lidocaine (LIDODERM) 5 % Place 1 patch onto the skin daily 12 hours on, 12 hours off. 30 patch 0    HYDROcodone-acetaminophen (NORCO) 5-325 MG per tablet Take 1 tablet by mouth every 6 hours as needed for Pain for up to 3 days. Sedation precautions please 12 tablet 0    alendronate (FOSAMAX) 70 MG tablet TAKE 1 TABLET EVERY 7 DAYS 12 tablet 3    atorvastatin (LIPITOR) 10 MG tablet Take 1 tablet by mouth daily 90 tablet 3    aspirin 81 MG tablet Take 81 mg by mouth daily      Multiple Vitamins-Minerals (ICAPS) TABS Take  by mouth.  calcium carbonate (OSCAL) 500 MG TABS tablet Take 1,200 mg by mouth daily.        No current facility-administered medications for this visit. Physical Exam   Constitutional: She is oriented to person, place, and time. She appears well-developed and well-nourished. Uncomfortable appearing   HENT:   Mouth/Throat: No oropharyngeal exudate. Neck: Neck supple. No thyromegaly present. Cardiovascular: Normal rate, regular rhythm, normal heart sounds and intact distal pulses. Exam reveals no gallop and no friction rub. No murmur heard. Pulmonary/Chest: Effort normal and breath sounds normal. No respiratory distress. She has no wheezes. She has no rales. She exhibits no tenderness. Musculoskeletal: She exhibits no edema. Left thoracic paraspinal tenderness to palpation. B UE neurovasculary intact   Neurological: She is alert and oriented to person, place, and time. Skin: She is not diaphoretic. Vitals reviewed. Vitals:    04/25/19 0946   BP: 138/88   Pulse: 99   SpO2: 98%         ASSESSMENT/PLAN:  1. Compression fracture of body of thoracic vertebra (HCC)  Severe. Trial of Endocet. Discussed use, benefit, and side effects of prescribed medications. Barriers to compliance discussed. All patient questions answered. Pt voiced understanding. F/u with ortho. Call if no improvement or worsening symptoms  - oxyCODONE-acetaminophen (ENDOCET) 5-325 MG per tablet; Take 1 tablet by mouth every 6 hours as needed for Pain for up to 5 days. Intended supply: 5 days. Take lowest dose possible to manage pain  Dispense: 20 tablet;  Refill: 0

## 2019-04-26 ENCOUNTER — TELEPHONE (OUTPATIENT)
Dept: INTERNAL MEDICINE CLINIC | Age: 84
End: 2019-04-26

## 2019-04-29 ENCOUNTER — OFFICE VISIT (OUTPATIENT)
Dept: ORTHOPEDIC SURGERY | Age: 84
End: 2019-04-29
Payer: MEDICARE

## 2019-04-29 VITALS — WEIGHT: 154.1 LBS | BODY MASS INDEX: 26.31 KG/M2 | HEIGHT: 64 IN

## 2019-04-29 DIAGNOSIS — M48.54XA: Primary | ICD-10-CM

## 2019-04-29 PROCEDURE — G8399 PT W/DXA RESULTS DOCUMENT: HCPCS | Performed by: PHYSICIAN ASSISTANT

## 2019-04-29 PROCEDURE — 1090F PRES/ABSN URINE INCON ASSESS: CPT | Performed by: PHYSICIAN ASSISTANT

## 2019-04-29 PROCEDURE — 1036F TOBACCO NON-USER: CPT | Performed by: PHYSICIAN ASSISTANT

## 2019-04-29 PROCEDURE — G8427 DOCREV CUR MEDS BY ELIG CLIN: HCPCS | Performed by: PHYSICIAN ASSISTANT

## 2019-04-29 PROCEDURE — G8419 CALC BMI OUT NRM PARAM NOF/U: HCPCS | Performed by: PHYSICIAN ASSISTANT

## 2019-04-29 PROCEDURE — 4040F PNEUMOC VAC/ADMIN/RCVD: CPT | Performed by: PHYSICIAN ASSISTANT

## 2019-04-29 PROCEDURE — 99203 OFFICE O/P NEW LOW 30 MIN: CPT | Performed by: PHYSICIAN ASSISTANT

## 2019-04-29 PROCEDURE — 1123F ACP DISCUSS/DSCN MKR DOCD: CPT | Performed by: PHYSICIAN ASSISTANT

## 2019-04-29 RX ORDER — OXYCODONE HYDROCHLORIDE AND ACETAMINOPHEN 5; 325 MG/1; MG/1
1 TABLET ORAL
Qty: 42 TABLET | Refills: 0 | Status: SHIPPED | OUTPATIENT
Start: 2019-04-29 | End: 2019-05-06

## 2019-04-29 NOTE — PROGRESS NOTES
History of present illness:   Ms. Mone Serrano is a pleasant 80 y.o. old female with a PMH of osteoporosis, IT band syndrome, arthritis, and macular degeneration kindly referred by Dr. Justen Lakhani from [facility] ED for consultation regarding Ms. Román Morales's mid back and left shoulder pain. She states the pain began about 2 weeks ago after a fall. States the pain came on about a week after the initial fall. Her pain has steadily increased since then. She rates her back pain 10/10 and left shoulder pain 10/10. She describes the pain as sharp and stabbing. Pain is worse with any motions and slightly better with sitting in recliner. The arm pain radiates to her shoulder. She denies numbness and tingling. She admits occasional weakness of her left arm. Patient denies difficulty with fine motor skills. She denies lower extremity symptoms, gait abnormality and bowel or bladder dysfunction. The pain moderately interferes with her sleep. She takes Endocet for pain. Past medical history:   Her past medical history has been reviewed and is significant for osteoporosis, IT band syndrome, arthritis, and macular degeneration. Past surgical history:   Her past surgical history has been reviewed and is non-contributory to her present illness. Her  medications and allergies were reviewed. Social history:   Her social history has been reviewed and she denies smoking history. Current Medication:  Current Outpatient Medications   Medication Sig Dispense Refill    oxyCODONE-acetaminophen (ENDOCET) 5-325 MG per tablet Take 1 tablet by mouth every 6 hours as needed for Pain for up to 5 days. Intended supply: 5 days. Take lowest dose possible to manage pain 20 tablet 0    lidocaine (LIDODERM) 5 % Place 1 patch onto the skin daily 12 hours on, 12 hours off.  30 patch 0    alendronate (FOSAMAX) 70 MG tablet TAKE 1 TABLET EVERY 7 DAYS 12 tablet 3    atorvastatin (LIPITOR) 10 MG tablet Take 1 tablet by mouth daily 90 tablet 3    aspirin 81 MG tablet Take 81 mg by mouth daily      Multiple Vitamins-Minerals (ICAPS) TABS Take  by mouth.  calcium carbonate (OSCAL) 500 MG TABS tablet Take 1,200 mg by mouth daily. No current facility-administered medications for this visit. Review of symptoms:   Her review of symptoms was reviewed and is significant for neck pain and negative for recent weight loss, fatigue, chills, night sweats, blood in stool or urine, recent infection, chest pain, visual disturbance, or shortness of breath. All other ROS were negative. Physical examination:   Ms. Riya Morales's most recent vitals:  Vitals  Height: 5' 4.02\" (162.6 cm)  Weight: 154 lb 1.6 oz (69.9 kg)  Body mass index is 26.44 kg/m². General Exam:  She is well-developed and well-nourished, is in obvious pain and alert and oriented to person, place, and time. She demonstrates appropriate mood and affect. Gait not observed patient in wheelchair. HEENT:   Her cervical flexion, extension, and axial rotation are normal. Her skin is warm and dry. She has no tenderness over her cervical spine and no obvious muscle spasm. She has tenderness over thoracic spine and left paraspinal muscles. The skin over her cervical and thoracic spine is normal without surgical scar. Upper extremities:  She has 5/5 strength of her interosseous muscles, wrist dorsiflexors and volarflexors, biceps, triceps, deltoids, and internal and external rotators of her shoulders, bilaterally. Her biceps, triceps, bracheoradialis, quadriceps and achilles reflexes are 2+, bilaterally. Sensation is intact to light touchfrom C6 to C8. She has no clonus and negative Redmond's bilaterally. Lower extremities:  Normal DTR knees, no clonus. Imaging:   I reviewed CT images of her chest and thoracic spine from 4/22/19. They show mild to moderate compression deformity at T5 which is new from November 2017 imaging.     I reviewed AP and lateral x-ray images of her thoracic spine from today. They show mild additional compression of her T5 vertebra. Assessment:   2 weeks s/p T5 compression fracture    Plan:   We discussed treatment options including observation, epifanio orthosis, additional imaging, kyphoplasty. She would like to proceed with epifanio orthosis. She will avoid bending, twisting, and lifting greater than 10lbs. She will return in 4 weeks for additional AP and lateral x-rays of her thoracic spine. Refill of Endocet sent to pharmacy. Dictated by Melissa Alanis PA-C. Patient also seen and examined by Dr. Cortney Hall.

## 2019-05-02 ENCOUNTER — TELEPHONE (OUTPATIENT)
Dept: INTERNAL MEDICINE CLINIC | Age: 84
End: 2019-05-02

## 2019-05-02 DIAGNOSIS — M62.838 MUSCLE SPASM: Primary | ICD-10-CM

## 2019-05-02 RX ORDER — BACLOFEN 10 MG/1
5 TABLET ORAL NIGHTLY PRN
Qty: 15 TABLET | Refills: 0 | Status: SHIPPED | OUTPATIENT
Start: 2019-05-02 | End: 2019-05-23 | Stop reason: SDUPTHER

## 2019-05-02 NOTE — TELEPHONE ENCOUNTER
Discussed with daughter (ok per [employer]). Pt sustained compression fracture of T6. Now having muscle spasms which are severe. Little relief with Endcet when she has the muscle spasms. tx options discussed. Tx with baclofen prn.  Call if no improvement or worsening symptoms

## 2019-05-08 ENCOUNTER — OFFICE VISIT (OUTPATIENT)
Dept: INTERNAL MEDICINE CLINIC | Age: 84
End: 2019-05-08
Payer: MEDICARE

## 2019-05-08 ENCOUNTER — TELEPHONE (OUTPATIENT)
Dept: ORTHOPEDIC SURGERY | Age: 84
End: 2019-05-08

## 2019-05-08 VITALS
DIASTOLIC BLOOD PRESSURE: 70 MMHG | OXYGEN SATURATION: 98 % | HEART RATE: 94 BPM | SYSTOLIC BLOOD PRESSURE: 136 MMHG | WEIGHT: 153 LBS | BODY MASS INDEX: 26.25 KG/M2

## 2019-05-08 DIAGNOSIS — M48.54XA: Primary | ICD-10-CM

## 2019-05-08 DIAGNOSIS — M25.511 ACUTE PAIN OF RIGHT SHOULDER: ICD-10-CM

## 2019-05-08 DIAGNOSIS — S22.000A COMPRESSION FRACTURE OF BODY OF THORACIC VERTEBRA (HCC): Primary | ICD-10-CM

## 2019-05-08 PROCEDURE — 1036F TOBACCO NON-USER: CPT | Performed by: INTERNAL MEDICINE

## 2019-05-08 PROCEDURE — G8427 DOCREV CUR MEDS BY ELIG CLIN: HCPCS | Performed by: INTERNAL MEDICINE

## 2019-05-08 PROCEDURE — 1123F ACP DISCUSS/DSCN MKR DOCD: CPT | Performed by: INTERNAL MEDICINE

## 2019-05-08 PROCEDURE — 1090F PRES/ABSN URINE INCON ASSESS: CPT | Performed by: INTERNAL MEDICINE

## 2019-05-08 PROCEDURE — G8399 PT W/DXA RESULTS DOCUMENT: HCPCS | Performed by: INTERNAL MEDICINE

## 2019-05-08 PROCEDURE — G8419 CALC BMI OUT NRM PARAM NOF/U: HCPCS | Performed by: INTERNAL MEDICINE

## 2019-05-08 PROCEDURE — 99214 OFFICE O/P EST MOD 30 MIN: CPT | Performed by: INTERNAL MEDICINE

## 2019-05-08 PROCEDURE — 4040F PNEUMOC VAC/ADMIN/RCVD: CPT | Performed by: INTERNAL MEDICINE

## 2019-05-08 RX ORDER — OXYCODONE HYDROCHLORIDE AND ACETAMINOPHEN 5; 325 MG/1; MG/1
1-2 TABLET ORAL EVERY 6 HOURS PRN
Qty: 56 TABLET | Refills: 0 | Status: SHIPPED | OUTPATIENT
Start: 2019-05-08 | End: 2019-05-15

## 2019-05-08 ASSESSMENT — PATIENT HEALTH QUESTIONNAIRE - PHQ9
1. LITTLE INTEREST OR PLEASURE IN DOING THINGS: 1
2. FEELING DOWN, DEPRESSED OR HOPELESS: 3
SUM OF ALL RESPONSES TO PHQ QUESTIONS 1-9: 15
SUM OF ALL RESPONSES TO PHQ QUESTIONS 1-9: 15
5. POOR APPETITE OR OVEREATING: 0
10. IF YOU CHECKED OFF ANY PROBLEMS, HOW DIFFICULT HAVE THESE PROBLEMS MADE IT FOR YOU TO DO YOUR WORK, TAKE CARE OF THINGS AT HOME, OR GET ALONG WITH OTHER PEOPLE: 3
6. FEELING BAD ABOUT YOURSELF - OR THAT YOU ARE A FAILURE OR HAVE LET YOURSELF OR YOUR FAMILY DOWN: 3
8. MOVING OR SPEAKING SO SLOWLY THAT OTHER PEOPLE COULD HAVE NOTICED. OR THE OPPOSITE, BEING SO FIGETY OR RESTLESS THAT YOU HAVE BEEN MOVING AROUND A LOT MORE THAN USUAL: 0
9. THOUGHTS THAT YOU WOULD BE BETTER OFF DEAD, OR OF HURTING YOURSELF: 2
7. TROUBLE CONCENTRATING ON THINGS, SUCH AS READING THE NEWSPAPER OR WATCHING TELEVISION: 0
3. TROUBLE FALLING OR STAYING ASLEEP: 3
4. FEELING TIRED OR HAVING LITTLE ENERGY: 3
SUM OF ALL RESPONSES TO PHQ9 QUESTIONS 1 & 2: 4

## 2019-05-08 NOTE — PROGRESS NOTES
Marcie Hooper  1935      HPI  Here for f/u back pain and shoulder pain that began 4/21/19. No infury. Pain is worse with moving her hands and when coughing. Found to have a thoracic compression fx. Rates pain 10/10. Radiates to her left shoulder. Pain is severe and described as sharp. No relief with Norco and baclofen. Minimal relief with oxycodone. Has to sleep in a chair due to discomfort when lying down. Seeing ortho for tx. No improvement since last visit      Review of Systems   Constitutional: Negative for unexpected weight change. Respiratory: Negative for cough, chest tightness, shortness of breath and wheezing. Cardiovascular: Negative for chest pain, palpitations and leg swelling. Gastrointestinal: Negative for abdominal distention, constipation, diarrhea, nausea and vomiting. Musculoskeletal: Positive for back pain. Negative for arthralgias and myalgias. Shoulder pain     Neurological: Negative for tremors and numbness. All other systems reviewed and are negative. Current Outpatient Medications   Medication Sig Dispense Refill    oxyCODONE-acetaminophen (ENDOCET) 5-325 MG per tablet Take 1 tablet by mouth every 6 hours as needed for Pain for up to 5 days. Intended supply: 5 days. Take lowest dose possible to manage pain 20 tablet 0    lidocaine (LIDODERM) 5 % Place 1 patch onto the skin daily 12 hours on, 12 hours off. 30 patch 0    HYDROcodone-acetaminophen (NORCO) 5-325 MG per tablet Take 1 tablet by mouth every 6 hours as needed for Pain for up to 3 days. Sedation precautions please 12 tablet 0    alendronate (FOSAMAX) 70 MG tablet TAKE 1 TABLET EVERY 7 DAYS 12 tablet 3    atorvastatin (LIPITOR) 10 MG tablet Take 1 tablet by mouth daily 90 tablet 3    aspirin 81 MG tablet Take 81 mg by mouth daily      Multiple Vitamins-Minerals (ICAPS) TABS Take  by mouth.  calcium carbonate (OSCAL) 500 MG TABS tablet Take 1,200 mg by mouth daily.        No current facility-administered medications for this visit. Physical Exam   Constitutional: She is oriented to person, place, and time. She appears well-developed and well-nourished. Uncomfortable appearing   HENT:   Mouth/Throat: No oropharyngeal exudate. Neck: Neck supple. No thyromegaly present. Cardiovascular: Normal rate, regular rhythm, normal heart sounds and intact distal pulses. Exam reveals no gallop and no friction rub. No murmur heard. Pulmonary/Chest: Effort normal and breath sounds normal. No respiratory distress. She has no wheezes. She has no rales. She exhibits no tenderness. Musculoskeletal: She exhibits no edema. Left thoracic paraspinal tenderness to palpation. B UE neurovasculary intact   Neurological: She is alert and oriented to person, place, and time. Skin: She is not diaphoretic. Vitals reviewed. Vitals:    05/08/19 1057   BP: 136/70   Pulse: 94   SpO2: 98%                              ASSESSMENT/PLAN:  1. Compression fracture of body of thoracic vertebra (HCC)  Severe. Discussed use, benefit, and side effects of prescribed medications. Barriers to compliance discussed. All patient questions answered. Pt voiced understanding. F/u with ortho. Call if no improvement or worsening symptoms. Continue oxycodone-apap 5-325mg and baclofen 10mg    2. Shoulder pain  severe.  F/u with ortho

## 2019-05-23 DIAGNOSIS — M62.838 MUSCLE SPASM: ICD-10-CM

## 2019-05-23 RX ORDER — BACLOFEN 10 MG/1
TABLET ORAL
Qty: 15 TABLET | Refills: 0 | Status: SHIPPED | OUTPATIENT
Start: 2019-05-23 | End: 2019-06-03 | Stop reason: SDUPTHER

## 2019-05-23 NOTE — TELEPHONE ENCOUNTER
Refill request for baclofen medication.      Name of Pharmacy- [facility]    Last visit - 5-8-19     Pending visit - 5-29-19    Last refill -5-2-19    Medication Contract signed -   Last [facility] ran-     Additional Comments

## 2019-05-23 NOTE — TELEPHONE ENCOUNTER
Please encourage Ms. Morales to be careful with taking baclofen as it can cause grogginess, fatigue.

## 2019-05-28 ENCOUNTER — OFFICE VISIT (OUTPATIENT)
Dept: ORTHOPEDIC SURGERY | Age: 84
End: 2019-05-28
Payer: MEDICARE

## 2019-05-28 VITALS — BODY MASS INDEX: 26.12 KG/M2 | WEIGHT: 153 LBS | HEIGHT: 64 IN

## 2019-05-28 DIAGNOSIS — S22.050D CLOSED WEDGE COMPRESSION FRACTURE OF FIFTH THORACIC VERTEBRA WITH ROUTINE HEALING, SUBSEQUENT ENCOUNTER: Primary | ICD-10-CM

## 2019-05-28 PROCEDURE — 4040F PNEUMOC VAC/ADMIN/RCVD: CPT | Performed by: ORTHOPAEDIC SURGERY

## 2019-05-28 PROCEDURE — 1090F PRES/ABSN URINE INCON ASSESS: CPT | Performed by: ORTHOPAEDIC SURGERY

## 2019-05-28 PROCEDURE — 1123F ACP DISCUSS/DSCN MKR DOCD: CPT | Performed by: ORTHOPAEDIC SURGERY

## 2019-05-28 PROCEDURE — G8399 PT W/DXA RESULTS DOCUMENT: HCPCS | Performed by: ORTHOPAEDIC SURGERY

## 2019-05-28 PROCEDURE — 1036F TOBACCO NON-USER: CPT | Performed by: ORTHOPAEDIC SURGERY

## 2019-05-28 PROCEDURE — G8427 DOCREV CUR MEDS BY ELIG CLIN: HCPCS | Performed by: ORTHOPAEDIC SURGERY

## 2019-05-28 PROCEDURE — G8419 CALC BMI OUT NRM PARAM NOF/U: HCPCS | Performed by: ORTHOPAEDIC SURGERY

## 2019-05-28 PROCEDURE — 99213 OFFICE O/P EST LOW 20 MIN: CPT | Performed by: ORTHOPAEDIC SURGERY

## 2019-05-29 ENCOUNTER — TELEPHONE (OUTPATIENT)
Dept: INTERNAL MEDICINE CLINIC | Age: 84
End: 2019-05-29

## 2019-05-29 ENCOUNTER — NURSE ONLY (OUTPATIENT)
Dept: INTERNAL MEDICINE CLINIC | Age: 84
End: 2019-05-29
Payer: MEDICARE

## 2019-05-29 DIAGNOSIS — Z23 IMMUNIZATION DUE: Primary | ICD-10-CM

## 2019-05-29 PROCEDURE — 90471 IMMUNIZATION ADMIN: CPT | Performed by: INTERNAL MEDICINE

## 2019-05-29 PROCEDURE — 90750 HZV VACC RECOMBINANT IM: CPT | Performed by: INTERNAL MEDICINE

## 2019-05-29 NOTE — TELEPHONE ENCOUNTER
Dajaioana Earnestine was in for her second Shingrix injection today. She would like Dr Guillen Most to  Call her. Her legs and feet are very swollen.  Please call her at [phone]

## 2019-06-03 ENCOUNTER — OFFICE VISIT (OUTPATIENT)
Dept: INTERNAL MEDICINE CLINIC | Age: 84
End: 2019-06-03
Payer: MEDICARE

## 2019-06-03 VITALS
BODY MASS INDEX: 26.08 KG/M2 | OXYGEN SATURATION: 98 % | WEIGHT: 152 LBS | SYSTOLIC BLOOD PRESSURE: 132 MMHG | HEART RATE: 77 BPM | DIASTOLIC BLOOD PRESSURE: 80 MMHG

## 2019-06-03 DIAGNOSIS — M62.838 MUSCLE SPASM: ICD-10-CM

## 2019-06-03 DIAGNOSIS — R60.0 LOCALIZED EDEMA: Primary | ICD-10-CM

## 2019-06-03 PROCEDURE — 1090F PRES/ABSN URINE INCON ASSESS: CPT | Performed by: INTERNAL MEDICINE

## 2019-06-03 PROCEDURE — G8399 PT W/DXA RESULTS DOCUMENT: HCPCS | Performed by: INTERNAL MEDICINE

## 2019-06-03 PROCEDURE — G8419 CALC BMI OUT NRM PARAM NOF/U: HCPCS | Performed by: INTERNAL MEDICINE

## 2019-06-03 PROCEDURE — 1123F ACP DISCUSS/DSCN MKR DOCD: CPT | Performed by: INTERNAL MEDICINE

## 2019-06-03 PROCEDURE — 1036F TOBACCO NON-USER: CPT | Performed by: INTERNAL MEDICINE

## 2019-06-03 PROCEDURE — G8427 DOCREV CUR MEDS BY ELIG CLIN: HCPCS | Performed by: INTERNAL MEDICINE

## 2019-06-03 PROCEDURE — 4040F PNEUMOC VAC/ADMIN/RCVD: CPT | Performed by: INTERNAL MEDICINE

## 2019-06-03 PROCEDURE — 99213 OFFICE O/P EST LOW 20 MIN: CPT | Performed by: INTERNAL MEDICINE

## 2019-06-03 RX ORDER — BACLOFEN 10 MG/1
TABLET ORAL
Qty: 15 TABLET | Refills: 0 | Status: SHIPPED | OUTPATIENT
Start: 2019-06-03 | End: 2019-06-20 | Stop reason: SDUPTHER

## 2019-06-03 RX ORDER — HYDROCHLOROTHIAZIDE 25 MG/1
12.5 TABLET ORAL DAILY
Qty: 15 TABLET | Refills: 3 | Status: SHIPPED | OUTPATIENT
Start: 2019-06-03 | End: 2021-04-14

## 2019-06-04 ASSESSMENT — ENCOUNTER SYMPTOMS
ABDOMINAL DISTENTION: 0
BACK PAIN: 1
SHORTNESS OF BREATH: 0
CONSTIPATION: 0
WHEEZING: 0
CHEST TIGHTNESS: 0
COUGH: 0
VOMITING: 0
NAUSEA: 0
DIARRHEA: 0

## 2019-06-04 NOTE — PROGRESS NOTES
6/4/19    Clint Gr  1935      Chief Complaint   Patient presents with    Joint Swelling       HPI    Here with c/o one month h/o B ankle swelling which occurred shortly after a compression fracture of thoracic vertebrae. Has been trying to stay active during the day. Sleeps a lot in her recliner due to her back discomfort. Requests refill on her muscle relaxer for back spasms    LE edema worsens as the day progresses. Not necessarily gone in am. Sleep in her recliner. Denies cp/sob/pnd/orthopnea/ or pain    Review of Systems   Constitutional: Negative for unexpected weight change. Respiratory: Negative for cough, chest tightness, shortness of breath and wheezing. Cardiovascular: Positive for leg swelling. Negative for chest pain and palpitations. Gastrointestinal: Negative for abdominal distention, constipation, diarrhea, nausea and vomiting. Musculoskeletal: Positive for back pain and myalgias. Negative for arthralgias. Neurological: Negative for tremors and numbness. All other systems reviewed and are negative. Current Outpatient Medications   Medication Sig Dispense Refill    hydrochlorothiazide (HYDRODIURIL) 25 MG tablet Take 0.5 tablets by mouth daily 15 tablet 3    baclofen (LIORESAL) 10 MG tablet TAKE 1/2 TABLET BY MOUTH NIGHTLY AS NEEDED FOR MUSCLE SPASMS 15 tablet 0    lidocaine (LIDODERM) 5 % Place 1 patch onto the skin daily 12 hours on, 12 hours off. 30 patch 0    alendronate (FOSAMAX) 70 MG tablet TAKE 1 TABLET EVERY 7 DAYS 12 tablet 3    atorvastatin (LIPITOR) 10 MG tablet Take 1 tablet by mouth daily 90 tablet 3    aspirin 81 MG tablet Take 81 mg by mouth daily      Multiple Vitamins-Minerals (ICAPS) TABS Take  by mouth.  calcium carbonate (OSCAL) 500 MG TABS tablet Take 1,200 mg by mouth daily. No current facility-administered medications for this visit. Physical Exam   Constitutional: She is oriented to person, place, and time.  She appears well-developed and well-nourished. No distress. HENT:   Right Ear: External ear normal.   Left Ear: External ear normal.   Mouth/Throat: Oropharynx is clear and moist. No oropharyngeal exudate. Neck: Neck supple. No thyromegaly present. Cardiovascular: Normal rate, regular rhythm and normal heart sounds. Exam reveals no gallop and no friction rub. No murmur heard. Pulmonary/Chest: Effort normal and breath sounds normal. No respiratory distress. She has no wheezes. She has no rales. Abdominal: Soft. She exhibits no distension. There is no tenderness. There is no rebound and no guarding. Musculoskeletal:   1+ pitting edema be ankles with multiple varicose veins. No calf tenderness, redness or swelling.     +B thoracic paraspinal muscle spasms   Neurological: She is alert and oriented to person, place, and time. Skin: She is not diaphoretic. Vitals:    06/03/19 1409   BP: 132/80   Pulse: 77   SpO2: 98%         ASSESSMENT/PLAN:  1. Localized edema  Likely dependent. Recommend elevation, compression stocking 12.5 mg hctz x one week. Check bmp one weeks  - hydrochlorothiazide (HYDRODIURIL) 25 MG tablet; Take 0.5 tablets by mouth daily  Dispense: 15 tablet; Refill: 3  - Basic Metabolic Panel; Future    2. Muscle spasm  Discussed use, benefit, and side effects of prescribed medications. Barriers to compliance discussed. All patient questions answered. Pt voiced understanding.  - baclofen (LIORESAL) 10 MG tablet; TAKE 1/2 TABLET BY MOUTH NIGHTLY AS NEEDED FOR MUSCLE SPASMS  Dispense: 15 tablet;  Refill: 0

## 2019-06-12 ENCOUNTER — HOSPITAL ENCOUNTER (OUTPATIENT)
Age: 84
Discharge: HOME | End: 2019-06-12
Payer: MEDICARE

## 2019-06-12 DIAGNOSIS — R60.0 LOCALIZED EDEMA: ICD-10-CM

## 2019-06-12 LAB
ANION GAP SERPL CALCULATED.3IONS-SCNC: 12 MMOL/L (ref 3–16)
BUN BLDV-MCNC: 12 MG/DL (ref 7–20)
CALCIUM SERPL-MCNC: 9.8 MG/DL (ref 8.3–10.6)
CHLORIDE BLD-SCNC: 99 MMOL/L (ref 99–110)
CO2: 29 MMOL/L (ref 21–32)
CREAT SERPL-MCNC: 0.5 MG/DL (ref 0.6–1.2)
GFR AFRICAN AMERICAN: >60
GFR NON-AFRICAN AMERICAN: >60
GLUCOSE BLD-MCNC: 95 MG/DL (ref 70–99)
POTASSIUM SERPL-SCNC: 4.1 MMOL/L (ref 3.5–5.1)
SODIUM BLD-SCNC: 140 MMOL/L (ref 136–145)

## 2019-06-12 PROCEDURE — 80048 BASIC METABOLIC PNL TOTAL CA: CPT

## 2019-06-12 PROCEDURE — 36415 COLL VENOUS BLD VENIPUNCTURE: CPT

## 2019-06-20 DIAGNOSIS — M62.838 MUSCLE SPASM: ICD-10-CM

## 2019-06-20 RX ORDER — BACLOFEN 10 MG/1
TABLET ORAL
Qty: 15 TABLET | Refills: 0 | Status: SHIPPED | OUTPATIENT
Start: 2019-06-20 | End: 2019-07-29 | Stop reason: SDUPTHER

## 2019-07-02 ENCOUNTER — OFFICE VISIT (OUTPATIENT)
Dept: ORTHOPEDIC SURGERY | Age: 84
End: 2019-07-02
Payer: MEDICARE

## 2019-07-02 VITALS — WEIGHT: 151.9 LBS | BODY MASS INDEX: 25.93 KG/M2 | HEIGHT: 64 IN

## 2019-07-02 DIAGNOSIS — S22.050D CLOSED WEDGE COMPRESSION FRACTURE OF FIFTH THORACIC VERTEBRA WITH ROUTINE HEALING, SUBSEQUENT ENCOUNTER: Primary | ICD-10-CM

## 2019-07-02 PROCEDURE — 99212 OFFICE O/P EST SF 10 MIN: CPT | Performed by: PHYSICIAN ASSISTANT

## 2019-07-02 PROCEDURE — G8419 CALC BMI OUT NRM PARAM NOF/U: HCPCS | Performed by: PHYSICIAN ASSISTANT

## 2019-07-02 PROCEDURE — 4040F PNEUMOC VAC/ADMIN/RCVD: CPT | Performed by: PHYSICIAN ASSISTANT

## 2019-07-02 PROCEDURE — G8399 PT W/DXA RESULTS DOCUMENT: HCPCS | Performed by: PHYSICIAN ASSISTANT

## 2019-07-02 PROCEDURE — 1036F TOBACCO NON-USER: CPT | Performed by: PHYSICIAN ASSISTANT

## 2019-07-02 PROCEDURE — 1090F PRES/ABSN URINE INCON ASSESS: CPT | Performed by: PHYSICIAN ASSISTANT

## 2019-07-02 PROCEDURE — G8427 DOCREV CUR MEDS BY ELIG CLIN: HCPCS | Performed by: PHYSICIAN ASSISTANT

## 2019-07-02 PROCEDURE — 1123F ACP DISCUSS/DSCN MKR DOCD: CPT | Performed by: PHYSICIAN ASSISTANT

## 2019-07-02 NOTE — PROGRESS NOTES
raise, bilaterally. Sensation is intact to light touch L3 to S1 bilaterally. She has no clonus. Hip range of motion painless. Imaging:   I reviewed CT images of her chest and thoracic spine from 4/22/19. They show mild to moderate compression deformity at T5 which is new from November 2017 imaging. I reviewed AP and lateral xray images of her thoracic spine from the office today and from her previous office visit. They show severe compression of her T5 vertebra. Assessment:   11 weeks s/p T5 compression fracture    Plan:   She will begin to wean out of her epifanio orthosis and start to increase her activity over the next month. She will follow up PRN. Told patient we could refer her to osteoporosis specialist for her fractures and she declined at this time. Dictated by Albaro Acevedo PA-C. Patient also seen and examined by Dr. Mehreen Coombs.

## 2019-07-29 DIAGNOSIS — M62.838 MUSCLE SPASM: ICD-10-CM

## 2019-07-29 RX ORDER — BACLOFEN 10 MG/1
TABLET ORAL
Qty: 15 TABLET | Refills: 0 | Status: SHIPPED | OUTPATIENT
Start: 2019-07-29 | End: 2019-08-29 | Stop reason: SDUPTHER

## 2019-08-29 DIAGNOSIS — M62.838 MUSCLE SPASM: ICD-10-CM

## 2019-08-29 RX ORDER — BACLOFEN 10 MG/1
TABLET ORAL
Qty: 15 TABLET | Refills: 0 | Status: SHIPPED | OUTPATIENT
Start: 2019-08-29 | End: 2019-10-01 | Stop reason: SDUPTHER

## 2019-10-01 DIAGNOSIS — M62.838 MUSCLE SPASM: ICD-10-CM

## 2019-10-01 RX ORDER — BACLOFEN 10 MG/1
TABLET ORAL
Qty: 15 TABLET | Refills: 0 | Status: SHIPPED | OUTPATIENT
Start: 2019-10-01 | End: 2020-09-10

## 2019-10-18 ENCOUNTER — NURSE ONLY (OUTPATIENT)
Dept: INTERNAL MEDICINE CLINIC | Age: 84
End: 2019-10-18
Payer: MEDICARE

## 2019-10-18 DIAGNOSIS — Z23 NEED FOR IMMUNIZATION AGAINST INFLUENZA: Primary | ICD-10-CM

## 2019-10-18 PROCEDURE — G0008 ADMIN INFLUENZA VIRUS VAC: HCPCS | Performed by: INTERNAL MEDICINE

## 2019-10-18 PROCEDURE — 90653 IIV ADJUVANT VACCINE IM: CPT | Performed by: INTERNAL MEDICINE

## 2020-02-14 RX ORDER — ATORVASTATIN CALCIUM 10 MG/1
TABLET, FILM COATED ORAL
Qty: 90 TABLET | Refills: 3 | Status: SHIPPED | OUTPATIENT
Start: 2020-02-14 | End: 2020-11-16

## 2020-02-14 RX ORDER — ALENDRONATE SODIUM 70 MG/1
TABLET ORAL
Qty: 12 TABLET | Refills: 3 | Status: SHIPPED | OUTPATIENT
Start: 2020-02-14 | End: 2020-11-16

## 2020-09-09 ENCOUNTER — TELEPHONE (OUTPATIENT)
Dept: INTERNAL MEDICINE CLINIC | Age: 85
End: 2020-09-09

## 2020-09-09 ENCOUNTER — NURSE TRIAGE (OUTPATIENT)
Dept: OTHER | Facility: CLINIC | Age: 85
End: 2020-09-09

## 2020-09-09 NOTE — TELEPHONE ENCOUNTER
FYI    Patient called stating she has had shaking, twitches, and spasms for 3 to 4 weeks. She spoke to the nurse triage line who instructed her to go to the ER for evaluation. She demanded she be sent to our office to speak to Dr. Day Rolon. I informed her Dr. Day Rolon is out of the office today and we would recommend you go to the ER for evaluation. She said she doesn't really have a choice then if she isnt able to talk to Dr. Bam Padilla so she will go to the ER.

## 2020-09-09 NOTE — TELEPHONE ENCOUNTER
Reason for Disposition   [1] New-onset muscle jerks (twitches, spasms) AND [2] present now    Answer Assessment - Initial Assessment Questions  1. APPEARANCE of MOVEMENT: \"What did the jerking or twitching look like? \" (e.g., body area)        Pt starts shaking uncontrollably but is aware and able to respond     2. ONSET: \"When did this start happening? \" (e.g., hours, days, weeks, months ago)         3-4 weeks ago     3. DURATION: \"How long does the jerk, twitch, or spasm last?\"         5-10 minutes    4. FREQUENCY:  \"How often does this happen? \"           \"at least 3-4 times a day  And maybe more each day\"    5. WHEN: \"When does this happen? \" (e.g., while awake, while falling asleep, while sleeping)            Just happens any time, has woke her in sleep at night as well     6. CAUSE: \"What do you think caused the *No Answer*? \"            Unsure    7. OTHER SYMPTOMS: Margarita Peer there any other symptoms? \" (e.g., fever, headache)             Tired but pt is unable to get a good nights sleep due to being woke up from spells     8. PREGNANCY: \"Is there any chance you are pregnant? \" \"When was your last menstrual period? \"    Protocols used: [facility] - [facility]    Patient called [facility]) to schedule appointment, with red flag complaint, transferred to RN access for triage. Caller reports symptoms as documented above. Caller informed of disposition. Soft transfer to [facility] to transfer writer to office to speak with PCP per pts request. Pt states that Dr Jaskaran Garcia directed her to call any time to speak directly to her if she needed. Writer spoke to [facility] who stated Dr Jaskaran Garcia is not in today. Pt transferred to [place] for further recommendation of ED. Care advice as documented. Pt verbalized understanding and does not feel she needs to go to ED at this time. Please do not respond to the triage nurse through this encounter.   Any subsequent communication should be directly with the patient.

## 2020-09-10 ENCOUNTER — HOSPITAL ENCOUNTER (EMERGENCY)
Age: 85
Discharge: HOME | End: 2020-09-10
Payer: MEDICARE

## 2020-09-10 VITALS
BODY MASS INDEX: 25.1 KG/M2 | SYSTOLIC BLOOD PRESSURE: 117 MMHG | HEART RATE: 65 BPM | HEIGHT: 64 IN | WEIGHT: 147 LBS | RESPIRATION RATE: 18 BRPM | TEMPERATURE: 98 F | OXYGEN SATURATION: 96 % | DIASTOLIC BLOOD PRESSURE: 77 MMHG

## 2020-09-10 LAB
A/G RATIO: 1.5 (ref 1.1–2.2)
ALBUMIN SERPL-MCNC: 4.6 G/DL (ref 3.4–5)
ALP BLD-CCNC: 128 U/L (ref 40–129)
ALT SERPL-CCNC: 19 U/L (ref 10–40)
ANION GAP SERPL CALCULATED.3IONS-SCNC: 11 MMOL/L (ref 3–16)
AST SERPL-CCNC: 27 U/L (ref 15–37)
BASOPHILS ABSOLUTE: 0.1 K/UL (ref 0–0.2)
BASOPHILS RELATIVE PERCENT: 1 %
BILIRUB SERPL-MCNC: 0.4 MG/DL (ref 0–1)
BUN BLDV-MCNC: 14 MG/DL (ref 7–20)
CALCIUM SERPL-MCNC: 9.9 MG/DL (ref 8.3–10.6)
CHLORIDE BLD-SCNC: 100 MMOL/L (ref 99–110)
CO2: 28 MMOL/L (ref 21–32)
CREAT SERPL-MCNC: 0.6 MG/DL (ref 0.6–1.2)
EOSINOPHILS ABSOLUTE: 0.1 K/UL (ref 0–0.6)
EOSINOPHILS RELATIVE PERCENT: 1.5 %
GFR AFRICAN AMERICAN: >60
GFR NON-AFRICAN AMERICAN: >60
GLOBULIN: 3.1 G/DL
GLUCOSE BLD-MCNC: 98 MG/DL (ref 70–99)
HCT VFR BLD CALC: 44.1 % (ref 36–48)
HEMOGLOBIN: 14.4 G/DL (ref 12–16)
LYMPHOCYTES ABSOLUTE: 2 K/UL (ref 1–5.1)
LYMPHOCYTES RELATIVE PERCENT: 26.7 %
MCH RBC QN AUTO: 30.5 PG (ref 26–34)
MCHC RBC AUTO-ENTMCNC: 32.6 G/DL (ref 31–36)
MCV RBC AUTO: 93.3 FL (ref 80–100)
MONOCYTES ABSOLUTE: 1 K/UL (ref 0–1.3)
MONOCYTES RELATIVE PERCENT: 13.2 %
NEUTROPHILS ABSOLUTE: 4.2 K/UL (ref 1.7–7.7)
NEUTROPHILS RELATIVE PERCENT: 57.6 %
PDW BLD-RTO: 15.3 % (ref 12.4–15.4)
PLATELET # BLD: 203 K/UL (ref 135–450)
PMV BLD AUTO: 8 FL (ref 5–10.5)
POTASSIUM REFLEX MAGNESIUM: 4.2 MMOL/L (ref 3.5–5.1)
RBC # BLD: 4.72 M/UL (ref 4–5.2)
SEDIMENTATION RATE, ERYTHROCYTE: 30 MM/HR (ref 0–30)
SODIUM BLD-SCNC: 139 MMOL/L (ref 136–145)
TOTAL CK: 138 U/L (ref 26–192)
TOTAL PROTEIN: 7.7 G/DL (ref 6.4–8.2)
WBC # BLD: 7.3 K/UL (ref 4–11)

## 2020-09-10 PROCEDURE — 85025 COMPLETE CBC W/AUTO DIFF WBC: CPT

## 2020-09-10 PROCEDURE — 99283 EMERGENCY DEPT VISIT LOW MDM: CPT

## 2020-09-10 PROCEDURE — 82550 ASSAY OF CK (CPK): CPT

## 2020-09-10 PROCEDURE — 85652 RBC SED RATE AUTOMATED: CPT

## 2020-09-10 PROCEDURE — 80053 COMPREHEN METABOLIC PANEL: CPT

## 2020-09-10 PROCEDURE — 96374 THER/PROPH/DIAG INJ IV PUSH: CPT

## 2020-09-10 PROCEDURE — 6360000002 HC RX W HCPCS: Performed by: PHYSICIAN ASSISTANT

## 2020-09-10 RX ORDER — KETOROLAC TROMETHAMINE 30 MG/ML
15 INJECTION, SOLUTION INTRAMUSCULAR; INTRAVENOUS ONCE
Status: COMPLETED | OUTPATIENT
Start: 2020-09-10 | End: 2020-09-10

## 2020-09-10 RX ADMIN — KETOROLAC TROMETHAMINE 15 MG: 30 INJECTION, SOLUTION INTRAMUSCULAR at 13:02

## 2020-09-10 ASSESSMENT — ENCOUNTER SYMPTOMS
SHORTNESS OF BREATH: 0
EYES NEGATIVE: 1
COUGH: 0
ABDOMINAL PAIN: 0
NAUSEA: 0
COLOR CHANGE: 0
VOMITING: 0
BACK PAIN: 0

## 2020-09-10 ASSESSMENT — PAIN SCALES - GENERAL
PAINLEVEL_OUTOF10: 0

## 2020-09-10 NOTE — ED PROVIDER NOTES
[address] [facility]  ED  EMERGENCY DEPARTMENT ENCOUNTER        Pt Name: Bebo Elise  MRN: [id number]  [place] 1935  Date of evaluation: 9/10/2020  Provider: Rita Valera PA-C  PCP: Melissa Clarke MD  ED Attending: Aliyah Fermin DO      This patient was not seen by the attending provider     History provided by the patient    CHIEF COMPLAINT:     Chief Complaint   Patient presents with    Shaking     uncontrollable body movements that have been progressively worsening over the past 6 weeks    Joint Pain     pain in both shoulders and right knee that is worse at night and has been keeping her up at night       HISTORY OF PRESENT ILLNESS:      Beob Elise is a 80 y.o. female who arrives to the ED by private vehicle. Patient is here solely because she called primary care yesterday to get an appointment and reports she was unable to do so after being on the phone for 20 minutes. She is here reporting a tremor that is diffuse and has been going on for the last 6 weeks or more. She notes it getting gradually worse. She expresses concern for possible Parkinson's disease. She does not have this diagnosis at this time. She secondly complains of right knee pain as well as bilateral shoulder pain. These joint pains are worse at night and cause her to have difficulty sleeping. The right knee pain has been going on for at least 4 weeks and the shoulder pain is been going on for months. The patient occasionally takes an over-the-counter NSAID for symptoms but finds it only works for the short-term. Patient has not had fevers. She denies chest pain or shortness of breath. She denies GI symptoms. She was essentially hoping to get in with primary care more quickly and was unsuccessful in that attempt yesterday. Nursing Notes were reviewed     REVIEW OF SYSTEMS:     Review of Systems   Constitutional: Negative for appetite change, chills and fever. HENT: Negative. Eyes: Negative.     Respiratory: Medical: Not on file     Non-medical: Not on file   Tobacco Use    Smoking status: Never Smoker    Smokeless tobacco: Never Used   Substance and Sexual Activity    Alcohol use: Not Currently     Comment: occasional    Drug use: No    Sexual activity: Not on file   Lifestyle    Physical activity     Days per week: Not on file     Minutes per session: Not on file    Stress: Not on file   Relationships    Social connections     Talks on phone: Not on file     Gets together: Not on file     Attends Scientologist service: Not on file     Active member of club or organization: Not on file     Attends meetings of clubs or organizations: Not on file     Relationship status: Not on file    Intimate partner violence     Fear of current or ex partner: Not on file     Emotionally abused: Not on file     Physically abused: Not on file     Forced sexual activity: Not on file   Other Topics Concern    Not on file   Social History Narrative    Not on file       SCREENINGS:    [place] Coma Scale  Eye Opening: Spontaneous  Best Verbal Response: Oriented  Best Motor Response: Obeys commands  [place] Coma Scale Score: 15        PHYSICAL EXAM:       ED Triage Vitals [09/10/20 1201]   BP Temp Temp Source Pulse Resp SpO2 Height Weight   (!) 151/83 97.8 °F (36.6 °C) Oral 82 16 98 % 5' 4\" (1.626 m) 147 lb (66.7 kg)       Physical Exam    CONSTITUTIONAL: Awake and alert. Cooperative. Well-developed. Well-nourished. Non-toxic. No acute distress. HENT: Normocephalic. Atraumatic. External ears normal, without discharge. No nasal discharge. Oropharynx clear. Mucous membranes moist.  EYES: Conjunctiva non-injected. No scleral icterus. PERRL. EOM's grossly intact. NECK: Supple. Normal ROM. CARDIOVASCULAR: RRR. No Murmer. Intact distal pulses. PULMONARY/CHEST WALL: Effort normal. No tachypnea. Lungs clear to ausculation. ABDOMEN: Normal BS. Soft. Nondistended. No tenderness to palpate. No guarding.   /ANORECTAL: Not assessed  MUSKULOSKELETAL: Normal ROM. No acute deformities. No edema. No tenderness to palpate. SKIN: Warm and dry. No rash. No erythema. NEUROLOGICAL: Alert and oriented x 3. GCS 15. CN II-XII grossly intact. Strength is 5/5 in all extremities and sensation is intact. Mild resting tremor. Normal gait.    PSYCHIATRIC: Normal affect        DIAGNOSTICRESULTS:     LABS:    Results for orders placed or performed during the hospital encounter of 09/10/20   CBC Auto Differential   Result Value Ref Range    WBC 7.3 4.0 - 11.0 K/uL    RBC 4.72 4.00 - 5.20 M/uL    Hemoglobin 14.4 12.0 - 16.0 g/dL    Hematocrit 44.1 36.0 - 48.0 %    MCV 93.3 80.0 - 100.0 fL    MCH 30.5 26.0 - 34.0 pg    MCHC 32.6 31.0 - 36.0 g/dL    RDW 15.3 12.4 - 15.4 %    Platelets 548 485 - 582 K/uL    MPV 8.0 5.0 - 10.5 fL    Neutrophils % 57.6 %    Lymphocytes % 26.7 %    Monocytes % 13.2 %    Eosinophils % 1.5 %    Basophils % 1.0 %    Neutrophils Absolute 4.2 1.7 - 7.7 K/uL    Lymphocytes Absolute 2.0 1.0 - 5.1 K/uL    Monocytes Absolute 1.0 0.0 - 1.3 K/uL    Eosinophils Absolute 0.1 0.0 - 0.6 K/uL    Basophils Absolute 0.1 0.0 - 0.2 K/uL   Comprehensive Metabolic Panel w/ Reflex to MG   Result Value Ref Range    Sodium 139 136 - 145 mmol/L    Potassium reflex Magnesium 4.2 3.5 - 5.1 mmol/L    Chloride 100 99 - 110 mmol/L    CO2 28 21 - 32 mmol/L    Anion Gap 11 3 - 16    Glucose 98 70 - 99 mg/dL    BUN 14 7 - 20 mg/dL    CREATININE 0.6 0.6 - 1.2 mg/dL    GFR Non-African American >60 >60    GFR African American >60 >60    Calcium 9.9 8.3 - 10.6 mg/dL    Total Protein 7.7 6.4 - 8.2 g/dL    Alb 4.6 3.4 - 5.0 g/dL    Albumin/Globulin Ratio 1.5 1.1 - 2.2    Total Bilirubin 0.4 0.0 - 1.0 mg/dL    Alkaline Phosphatase 128 40 - 129 U/L    ALT 19 10 - 40 U/L    AST 27 15 - 37 U/L    Globulin 3.1 g/dL   CK   Result Value Ref Range    Total  26 - 192 U/L           PROCEDURES:   N/A    CRITICAL CARE TIME:       None      CONSULTS:  IP CONSULT TO PRIMARY CARE PROVIDER      EMERGENCY DEPARTMENT COURSE and DIFFERENTIAL DIAGNOSIS/MDM:   Vitals:    Vitals:    09/10/20 1201 09/10/20 1307   BP: (!) 151/83 129/77   Pulse: 82 69   Resp: 16 14   Temp: 97.8 °F (36.6 °C)    TempSrc: Oral    SpO2: 98% 96%   Weight: 147 lb (66.7 kg)    Height: 5' 4\" (1.626 m)        Patient was given the following medications:  Medications   ketorolac (TORADOL) injection 15 mg (15 mg Intravenous Given 9/10/20 1302)         I have evaluated this patient in the ED. Old records were reviewed. Patient tells me that she has been experiencing a tremor for at least 6 weeks that is gradually worsening. She is worried about Parkinson's disease. She also reports joint pain that is been going on for 4 weeks to months and at times keeps her up at night. She was unsuccessful in getting a prompt appointment with primary care and is therefore here for evaluation. Given her age and complaints screening labs are performed including a CBC, CMP as well as CK and sed rate. CBC is normal with WBC 7.3, H&H 14.4 and 44.1 and platelet count 060. CMP is completely normal.  CK normal at 138. Sed rate still pending. While in the ED the patient was given Toradol 15 mg IV. I called her PCP and spoke directly with Dr. Day Rolon. I made her aware of the patient's complaints and reason for visiting the ED today and requested a follow-up appointment. Appointment is set for 9/24/2020 at 8 AM.  I reviewed results of labs with both the patient as well as with her PCP on the phone. PCP can follow-up with pending sed rate. Patient will continue over-the-counter NSAIDs for joint pain control until seen by primary care. I estimate there is LOW risk for ACUTE FRACTURE, COMPARTMENT SYNDROME, DEEP VENOUS THROMBOSIS, SEPTIC ARTHRITIS, TENDON OR NEUROVASCULAR INJURY, thus I consider the discharge disposition reasonable.  Fahad Hand and I have discussed the diagnosis and risks, and we agree with discharging home to

## 2020-09-10 NOTE — ED NOTES
Called pt's PCP- [phone]  Re: tremor, joint pain per [email or web address] [address] [facility]  09/10/20 7521

## 2020-09-24 ENCOUNTER — OFFICE VISIT (OUTPATIENT)
Dept: INTERNAL MEDICINE CLINIC | Age: 85
End: 2020-09-24
Payer: MEDICARE

## 2020-09-24 VITALS
DIASTOLIC BLOOD PRESSURE: 78 MMHG | HEART RATE: 98 BPM | TEMPERATURE: 98.4 F | HEIGHT: 64 IN | WEIGHT: 147 LBS | SYSTOLIC BLOOD PRESSURE: 136 MMHG | BODY MASS INDEX: 25.1 KG/M2 | OXYGEN SATURATION: 98 %

## 2020-09-24 PROCEDURE — 99214 OFFICE O/P EST MOD 30 MIN: CPT | Performed by: INTERNAL MEDICINE

## 2020-09-24 PROCEDURE — G8399 PT W/DXA RESULTS DOCUMENT: HCPCS | Performed by: INTERNAL MEDICINE

## 2020-09-24 PROCEDURE — G8417 CALC BMI ABV UP PARAM F/U: HCPCS | Performed by: INTERNAL MEDICINE

## 2020-09-24 PROCEDURE — G8427 DOCREV CUR MEDS BY ELIG CLIN: HCPCS | Performed by: INTERNAL MEDICINE

## 2020-09-24 PROCEDURE — 4040F PNEUMOC VAC/ADMIN/RCVD: CPT | Performed by: INTERNAL MEDICINE

## 2020-09-24 PROCEDURE — 1036F TOBACCO NON-USER: CPT | Performed by: INTERNAL MEDICINE

## 2020-09-24 PROCEDURE — G0008 ADMIN INFLUENZA VIRUS VAC: HCPCS | Performed by: INTERNAL MEDICINE

## 2020-09-24 PROCEDURE — 1090F PRES/ABSN URINE INCON ASSESS: CPT | Performed by: INTERNAL MEDICINE

## 2020-09-24 PROCEDURE — 90694 VACC AIIV4 NO PRSRV 0.5ML IM: CPT | Performed by: INTERNAL MEDICINE

## 2020-09-24 PROCEDURE — 1123F ACP DISCUSS/DSCN MKR DOCD: CPT | Performed by: INTERNAL MEDICINE

## 2020-09-24 PROCEDURE — G8510 SCR DEP NEG, NO PLAN REQD: HCPCS | Performed by: INTERNAL MEDICINE

## 2020-09-24 PROCEDURE — 3288F FALL RISK ASSESSMENT DOCD: CPT | Performed by: INTERNAL MEDICINE

## 2020-09-24 PROCEDURE — 1111F DSCHRG MED/CURRENT MED MERGE: CPT | Performed by: INTERNAL MEDICINE

## 2020-09-24 RX ORDER — NAPROXEN 500 MG/1
500 TABLET ORAL 2 TIMES DAILY WITH MEALS
Qty: 60 TABLET | Refills: 3 | Status: SHIPPED | OUTPATIENT
Start: 2020-09-24 | End: 2021-05-17

## 2020-09-24 RX ORDER — PROPRANOLOL HYDROCHLORIDE 10 MG/1
10 TABLET ORAL DAILY
Qty: 30 TABLET | Refills: 3 | Status: SHIPPED | OUTPATIENT
Start: 2020-09-24 | End: 2020-10-22 | Stop reason: DRUGHIGH

## 2020-09-24 ASSESSMENT — PATIENT HEALTH QUESTIONNAIRE - PHQ9
SUM OF ALL RESPONSES TO PHQ QUESTIONS 1-9: 0
2. FEELING DOWN, DEPRESSED OR HOPELESS: 0
SUM OF ALL RESPONSES TO PHQ9 QUESTIONS 1 & 2: 0
SUM OF ALL RESPONSES TO PHQ QUESTIONS 1-9: 0
1. LITTLE INTEREST OR PLEASURE IN DOING THINGS: 0

## 2020-09-24 ASSESSMENT — ENCOUNTER SYMPTOMS
VOMITING: 0
WHEEZING: 0
NAUSEA: 0
CHEST TIGHTNESS: 0
BACK PAIN: 0
ABDOMINAL DISTENTION: 0
CONSTIPATION: 0
COUGH: 0
DIARRHEA: 0
SHORTNESS OF BREATH: 0

## 2020-09-24 NOTE — PROGRESS NOTES
Exam  Constitutional:       General: She is not in acute distress. Appearance: She is well-developed. She is not diaphoretic. HENT:      Right Ear: External ear normal.      Left Ear: External ear normal.      Mouth/Throat:      Pharynx: No oropharyngeal exudate. Neck:      Musculoskeletal: Neck supple. Thyroid: No thyromegaly. Cardiovascular:      Rate and Rhythm: Normal rate and regular rhythm. Heart sounds: Normal heart sounds. No murmur. No friction rub. No gallop. Pulmonary:      Effort: Pulmonary effort is normal. No respiratory distress. Breath sounds: Normal breath sounds. No wheezing or rales. Abdominal:      General: There is no distension. Palpations: Abdomen is soft. Tenderness: There is no abdominal tenderness. There is no guarding or rebound. Musculoskeletal:      Comments: B shoulders with no palpable tenderness or deformity. Full rom without pain (active and passive)    Right knee with no effusion, deformity or redness. +full rom. +crepitus   Neurological:      General: No focal deficit present. Mental Status: She is alert and oriented to person, place, and time. Comments: Diffuse body tremor present at rest         Vitals:    09/24/20 0810   BP: 136/78   Pulse: 98   Temp: 98.4 °F (36.9 °C)   SpO2: 98%         ASSESSMENT/PLAN:  1. Tremor  Declines neurology referral at this time. Discussed use, benefit, and side effects of prescribed medications. Barriers to compliance discussed. All patient questions answered. Pt voiced understanding.  - propranolol (INDERAL) 10 MG tablet; Take 1 tablet by mouth daily  Dispense: 30 tablet; Refill: 3    2. Chronic pain of both shoulders  Hold Atorvastatin. Discussed use, benefit, and side effects of prescribed medications. Barriers to compliance discussed. All patient questions answered. Pt voiced understanding. Consider PT.  Call if no improvement or worsening symptoms  - naproxen (NAPROSYN) 500 MG tablet; Take 1 tablet by mouth 2 times daily (with meals)  Dispense: 60 tablet; Refill: 3    3.  Influenza vaccine administered  - INFLUENZA, QUADV, ADJUVANTED, 65 YRS =, IM, PF, PREFILL SYR, 0.5ML (FLUAD)

## 2020-09-24 NOTE — PROGRESS NOTES
Vaccine Information Sheet, \"Influenza - Inactivated\"  given to Neville Fountain, or parent/legal guardian of  Neville Fountain and verbalized understanding. Patient responses:    Have you ever had a reaction to a flu vaccine? No  Do you have any current illness? No  Have you ever had [place] Syndrome? No  Do you have a serious allergy to any of the follow: Neomycin, Polymyxin, Thimerosal, eggs or egg products? No    Flu vaccine given per order. Please see immunization tab. Risks and benefits explained. Current VIS given.       Immunizations Administered     Name Date Dose Route    Influenza, Quadv, adjuvanted, 65 yrs +, IM, PF (Fluad) 9/24/2020 0.5 mL Intramuscular    Site: Deltoid- Right    Lot: [id number]    NDC: 77669-914-16

## 2020-10-01 ENCOUNTER — TELEPHONE (OUTPATIENT)
Dept: INTERNAL MEDICINE CLINIC | Age: 85
End: 2020-10-01

## 2020-10-22 ENCOUNTER — OFFICE VISIT (OUTPATIENT)
Dept: INTERNAL MEDICINE CLINIC | Age: 85
End: 2020-10-22
Payer: MEDICARE

## 2020-10-22 VITALS
TEMPERATURE: 98.4 F | BODY MASS INDEX: 25.4 KG/M2 | OXYGEN SATURATION: 96 % | WEIGHT: 148 LBS | DIASTOLIC BLOOD PRESSURE: 78 MMHG | SYSTOLIC BLOOD PRESSURE: 120 MMHG | HEART RATE: 75 BPM

## 2020-10-22 PROCEDURE — G8417 CALC BMI ABV UP PARAM F/U: HCPCS | Performed by: INTERNAL MEDICINE

## 2020-10-22 PROCEDURE — G8427 DOCREV CUR MEDS BY ELIG CLIN: HCPCS | Performed by: INTERNAL MEDICINE

## 2020-10-22 PROCEDURE — 99213 OFFICE O/P EST LOW 20 MIN: CPT | Performed by: INTERNAL MEDICINE

## 2020-10-22 PROCEDURE — 1090F PRES/ABSN URINE INCON ASSESS: CPT | Performed by: INTERNAL MEDICINE

## 2020-10-22 PROCEDURE — 1036F TOBACCO NON-USER: CPT | Performed by: INTERNAL MEDICINE

## 2020-10-22 PROCEDURE — G8399 PT W/DXA RESULTS DOCUMENT: HCPCS | Performed by: INTERNAL MEDICINE

## 2020-10-22 PROCEDURE — 1123F ACP DISCUSS/DSCN MKR DOCD: CPT | Performed by: INTERNAL MEDICINE

## 2020-10-22 PROCEDURE — 4040F PNEUMOC VAC/ADMIN/RCVD: CPT | Performed by: INTERNAL MEDICINE

## 2020-10-22 PROCEDURE — G8484 FLU IMMUNIZE NO ADMIN: HCPCS | Performed by: INTERNAL MEDICINE

## 2020-10-22 RX ORDER — PROPRANOLOL HYDROCHLORIDE 10 MG/1
10 TABLET ORAL 2 TIMES DAILY
Qty: 90 TABLET | Refills: 3 | Status: SHIPPED | OUTPATIENT
Start: 2020-10-22 | End: 2021-04-14 | Stop reason: SDUPTHER

## 2020-10-22 ASSESSMENT — ENCOUNTER SYMPTOMS
CONSTIPATION: 0
COUGH: 0
BACK PAIN: 0
WHEEZING: 0
CHEST TIGHTNESS: 0
NAUSEA: 0
VOMITING: 0
DIARRHEA: 0
ABDOMINAL DISTENTION: 0
SHORTNESS OF BREATH: 0

## 2021-01-13 ENCOUNTER — TELEPHONE (OUTPATIENT)
Dept: INTERNAL MEDICINE CLINIC | Age: 86
End: 2021-01-13

## 2021-01-13 ENCOUNTER — OFFICE VISIT (OUTPATIENT)
Dept: PRIMARY CARE CLINIC | Age: 86
End: 2021-01-13
Payer: MEDICARE

## 2021-01-13 DIAGNOSIS — R19.7 DIARRHEA, UNSPECIFIED TYPE: Primary | ICD-10-CM

## 2021-01-13 DIAGNOSIS — Z11.59 SCREENING FOR VIRAL DISEASE: Primary | ICD-10-CM

## 2021-01-13 PROCEDURE — G8417 CALC BMI ABV UP PARAM F/U: HCPCS | Performed by: NURSE PRACTITIONER

## 2021-01-13 PROCEDURE — G8428 CUR MEDS NOT DOCUMENT: HCPCS | Performed by: NURSE PRACTITIONER

## 2021-01-13 PROCEDURE — 99211 OFF/OP EST MAY X REQ PHY/QHP: CPT | Performed by: NURSE PRACTITIONER

## 2021-01-13 NOTE — PROGRESS NOTES
Citlali Witt received a viral test for COVID-19. They were educated on isolation and quarantine as appropriate. For any symptoms, they were directed to seek care from their PCP, given contact information to establish with a doctor, directed to an urgent care or the emergency room.

## 2021-01-13 NOTE — PATIENT INSTRUCTIONS

## 2021-01-13 NOTE — TELEPHONE ENCOUNTER
Patient states that she has been having diarrhea for the past 2 weeks. She has been using pepto bismal and it help for a little while. States that stomach upset sounds. Increase cramps after eating . Has been on a bland diet for about 4 days. Denies any fevers .

## 2021-01-14 LAB — SARS-COV-2, NAA: NOT DETECTED

## 2021-01-19 ENCOUNTER — TELEPHONE (OUTPATIENT)
Dept: INTERNAL MEDICINE CLINIC | Age: 86
End: 2021-01-19

## 2021-01-19 NOTE — TELEPHONE ENCOUNTER
Patient tested negative for covid. She is still having diarrhea, and has to rush to the bathroom for fear of not getting there in time. This happens every time she eats.   Please advise   [phone]

## 2021-01-20 ENCOUNTER — HOSPITAL ENCOUNTER (OUTPATIENT)
Age: 86
Setting detail: SPECIMEN
Discharge: HOME | End: 2021-01-20
Payer: MEDICARE

## 2021-01-20 DIAGNOSIS — R19.7 DIARRHEA, UNSPECIFIED TYPE: ICD-10-CM

## 2021-01-20 LAB — C DIFF TOXIN/ANTIGEN: NORMAL

## 2021-01-20 PROCEDURE — 87328 CRYPTOSPORIDIUM AG IA: CPT

## 2021-01-20 PROCEDURE — 87449 NOS EACH ORGANISM AG IA: CPT

## 2021-01-20 PROCEDURE — 87324 CLOSTRIDIUM AG IA: CPT

## 2021-01-20 PROCEDURE — 87336 ENTAMOEB HIST DISPR AG IA: CPT

## 2021-01-20 PROCEDURE — 87329 GIARDIA AG IA: CPT

## 2021-01-22 LAB
CRYPTOSPORIDIUM ANTIGEN STOOL: NORMAL
E HISTOLYTICA ANTIGEN STOOL: NORMAL
GIARDIA ANTIGEN STOOL: NORMAL

## 2021-01-23 DIAGNOSIS — M81.0 OSTEOPOROSIS, UNSPECIFIED OSTEOPOROSIS TYPE, UNSPECIFIED PATHOLOGICAL FRACTURE PRESENCE: ICD-10-CM

## 2021-01-24 RX ORDER — ALENDRONATE SODIUM 70 MG/1
TABLET ORAL
Qty: 12 TABLET | Refills: 0 | Status: SHIPPED | OUTPATIENT
Start: 2021-01-24 | End: 2021-04-10

## 2021-04-10 DIAGNOSIS — M81.0 OSTEOPOROSIS, UNSPECIFIED OSTEOPOROSIS TYPE, UNSPECIFIED PATHOLOGICAL FRACTURE PRESENCE: ICD-10-CM

## 2021-04-10 RX ORDER — ALENDRONATE SODIUM 70 MG/1
TABLET ORAL
Qty: 12 TABLET | Refills: 0 | Status: SHIPPED | OUTPATIENT
Start: 2021-04-10 | End: 2021-04-14 | Stop reason: ALTCHOICE

## 2021-04-14 ENCOUNTER — OFFICE VISIT (OUTPATIENT)
Dept: INTERNAL MEDICINE CLINIC | Age: 86
End: 2021-04-14
Payer: MEDICARE

## 2021-04-14 VITALS
WEIGHT: 140 LBS | HEART RATE: 97 BPM | TEMPERATURE: 97.4 F | BODY MASS INDEX: 24.03 KG/M2 | DIASTOLIC BLOOD PRESSURE: 76 MMHG | OXYGEN SATURATION: 84 % | SYSTOLIC BLOOD PRESSURE: 122 MMHG

## 2021-04-14 DIAGNOSIS — M19.011 PRIMARY OSTEOARTHRITIS OF BOTH SHOULDERS: Primary | ICD-10-CM

## 2021-04-14 DIAGNOSIS — M65.341 TRIGGER RING FINGER OF RIGHT HAND: ICD-10-CM

## 2021-04-14 DIAGNOSIS — R25.1 TREMOR: ICD-10-CM

## 2021-04-14 DIAGNOSIS — M19.012 PRIMARY OSTEOARTHRITIS OF BOTH SHOULDERS: Primary | ICD-10-CM

## 2021-04-14 PROCEDURE — 4040F PNEUMOC VAC/ADMIN/RCVD: CPT | Performed by: NURSE PRACTITIONER

## 2021-04-14 PROCEDURE — G8427 DOCREV CUR MEDS BY ELIG CLIN: HCPCS | Performed by: NURSE PRACTITIONER

## 2021-04-14 PROCEDURE — 1036F TOBACCO NON-USER: CPT | Performed by: NURSE PRACTITIONER

## 2021-04-14 PROCEDURE — G8420 CALC BMI NORM PARAMETERS: HCPCS | Performed by: NURSE PRACTITIONER

## 2021-04-14 PROCEDURE — 1123F ACP DISCUSS/DSCN MKR DOCD: CPT | Performed by: NURSE PRACTITIONER

## 2021-04-14 PROCEDURE — 1090F PRES/ABSN URINE INCON ASSESS: CPT | Performed by: NURSE PRACTITIONER

## 2021-04-14 PROCEDURE — G8399 PT W/DXA RESULTS DOCUMENT: HCPCS | Performed by: NURSE PRACTITIONER

## 2021-04-14 PROCEDURE — 99213 OFFICE O/P EST LOW 20 MIN: CPT | Performed by: NURSE PRACTITIONER

## 2021-04-14 RX ORDER — MELOXICAM 15 MG/1
15 TABLET ORAL DAILY
Qty: 30 TABLET | Refills: 0 | Status: SHIPPED | OUTPATIENT
Start: 2021-04-14 | End: 2021-05-08

## 2021-04-14 RX ORDER — PROPRANOLOL HYDROCHLORIDE 10 MG/1
10 TABLET ORAL 2 TIMES DAILY
Qty: 90 TABLET | Refills: 3 | Status: SHIPPED | OUTPATIENT
Start: 2021-04-14 | End: 2021-09-08

## 2021-04-14 ASSESSMENT — ENCOUNTER SYMPTOMS
SINUS PRESSURE: 0
DIARRHEA: 0
VOMITING: 0
COUGH: 0
FACIAL SWELLING: 0
SORE THROAT: 0
NAUSEA: 0

## 2021-05-08 DIAGNOSIS — M19.012 PRIMARY OSTEOARTHRITIS OF BOTH SHOULDERS: ICD-10-CM

## 2021-05-08 DIAGNOSIS — M19.011 PRIMARY OSTEOARTHRITIS OF BOTH SHOULDERS: ICD-10-CM

## 2021-05-08 RX ORDER — MELOXICAM 15 MG/1
TABLET ORAL
Qty: 30 TABLET | Refills: 0 | Status: SHIPPED | OUTPATIENT
Start: 2021-05-08 | End: 2021-05-24

## 2021-05-17 DIAGNOSIS — M25.512 CHRONIC PAIN OF BOTH SHOULDERS: ICD-10-CM

## 2021-05-17 DIAGNOSIS — G89.29 CHRONIC PAIN OF BOTH SHOULDERS: ICD-10-CM

## 2021-05-17 DIAGNOSIS — M25.511 CHRONIC PAIN OF BOTH SHOULDERS: ICD-10-CM

## 2021-05-17 RX ORDER — NAPROXEN 500 MG/1
TABLET ORAL
Qty: 60 TABLET | Refills: 3 | Status: SHIPPED | OUTPATIENT
Start: 2021-05-17

## 2021-05-17 NOTE — TELEPHONE ENCOUNTER
Refill request for naproxen medication.      Name of Pharmacy- [facility]      Last visit - 4-14-21     Pending visit - 5-21-21    Last refill -1-22-21      Medication Contract signed -   Last Patricia Boron ran-         Additional Comments

## 2021-05-21 ENCOUNTER — OFFICE VISIT (OUTPATIENT)
Dept: INTERNAL MEDICINE CLINIC | Age: 86
End: 2021-05-21
Payer: MEDICARE

## 2021-05-21 ENCOUNTER — HOSPITAL ENCOUNTER (OUTPATIENT)
Age: 86
Discharge: HOME | End: 2021-05-21
Payer: MEDICARE

## 2021-05-21 VITALS
WEIGHT: 136 LBS | BODY MASS INDEX: 23.34 KG/M2 | HEART RATE: 76 BPM | SYSTOLIC BLOOD PRESSURE: 128 MMHG | OXYGEN SATURATION: 99 % | TEMPERATURE: 97.9 F | DIASTOLIC BLOOD PRESSURE: 72 MMHG

## 2021-05-21 DIAGNOSIS — R63.0 LOSS OF APPETITE: ICD-10-CM

## 2021-05-21 DIAGNOSIS — R63.4 UNINTENTIONAL WEIGHT LOSS: ICD-10-CM

## 2021-05-21 DIAGNOSIS — M25.512 CHRONIC PAIN OF BOTH SHOULDERS: Primary | ICD-10-CM

## 2021-05-21 DIAGNOSIS — G47.01 INSOMNIA DUE TO MEDICAL CONDITION: ICD-10-CM

## 2021-05-21 DIAGNOSIS — G89.29 CHRONIC PAIN OF BOTH SHOULDERS: Primary | ICD-10-CM

## 2021-05-21 DIAGNOSIS — M25.511 CHRONIC PAIN OF BOTH SHOULDERS: Primary | ICD-10-CM

## 2021-05-21 DIAGNOSIS — R25.1 TREMOR: ICD-10-CM

## 2021-05-21 DIAGNOSIS — R53.83 FATIGUE, UNSPECIFIED TYPE: ICD-10-CM

## 2021-05-21 LAB
A/G RATIO: 1.2 (ref 1.1–2.2)
ALBUMIN SERPL-MCNC: 4.2 G/DL (ref 3.4–5)
ALP BLD-CCNC: 411 U/L (ref 40–129)
ALT SERPL-CCNC: 52 U/L (ref 10–40)
ANION GAP SERPL CALCULATED.3IONS-SCNC: 13 MMOL/L (ref 3–16)
AST SERPL-CCNC: 49 U/L (ref 15–37)
BASOPHILS ABSOLUTE: 0.1 K/UL (ref 0–0.2)
BASOPHILS RELATIVE PERCENT: 1.1 %
BILIRUB SERPL-MCNC: 0.3 MG/DL (ref 0–1)
BUN BLDV-MCNC: 18 MG/DL (ref 7–20)
CALCIUM SERPL-MCNC: 9.9 MG/DL (ref 8.3–10.6)
CHLORIDE BLD-SCNC: 99 MMOL/L (ref 99–110)
CO2: 26 MMOL/L (ref 21–32)
CREAT SERPL-MCNC: 0.6 MG/DL (ref 0.6–1.2)
EOSINOPHILS ABSOLUTE: 0.2 K/UL (ref 0–0.6)
EOSINOPHILS RELATIVE PERCENT: 3.2 %
GFR AFRICAN AMERICAN: >60
GFR NON-AFRICAN AMERICAN: >60
GLOBULIN: 3.5 G/DL
GLUCOSE BLD-MCNC: 59 MG/DL (ref 70–99)
HCT VFR BLD CALC: 38.5 % (ref 36–48)
HEMOGLOBIN: 12.6 G/DL (ref 12–16)
LYMPHOCYTES ABSOLUTE: 2 K/UL (ref 1–5.1)
LYMPHOCYTES RELATIVE PERCENT: 26.2 %
MCH RBC QN AUTO: 30.1 PG (ref 26–34)
MCHC RBC AUTO-ENTMCNC: 32.8 G/DL (ref 31–36)
MCV RBC AUTO: 91.7 FL (ref 80–100)
MONOCYTES ABSOLUTE: 1.1 K/UL (ref 0–1.3)
MONOCYTES RELATIVE PERCENT: 13.6 %
NEUTROPHILS ABSOLUTE: 4.3 K/UL (ref 1.7–7.7)
NEUTROPHILS RELATIVE PERCENT: 55.9 %
PDW BLD-RTO: 16.2 % (ref 12.4–15.4)
PLATELET # BLD: 262 K/UL (ref 135–450)
PMV BLD AUTO: 8.8 FL (ref 5–10.5)
POTASSIUM SERPL-SCNC: 5 MMOL/L (ref 3.5–5.1)
RBC # BLD: 4.2 M/UL (ref 4–5.2)
SODIUM BLD-SCNC: 138 MMOL/L (ref 136–145)
TOTAL PROTEIN: 7.7 G/DL (ref 6.4–8.2)
WBC # BLD: 7.8 K/UL (ref 4–11)

## 2021-05-21 PROCEDURE — 36415 COLL VENOUS BLD VENIPUNCTURE: CPT

## 2021-05-21 PROCEDURE — 99214 OFFICE O/P EST MOD 30 MIN: CPT | Performed by: NURSE PRACTITIONER

## 2021-05-21 PROCEDURE — G8399 PT W/DXA RESULTS DOCUMENT: HCPCS | Performed by: NURSE PRACTITIONER

## 2021-05-21 PROCEDURE — 1123F ACP DISCUSS/DSCN MKR DOCD: CPT | Performed by: NURSE PRACTITIONER

## 2021-05-21 PROCEDURE — 4040F PNEUMOC VAC/ADMIN/RCVD: CPT | Performed by: NURSE PRACTITIONER

## 2021-05-21 PROCEDURE — 1036F TOBACCO NON-USER: CPT | Performed by: NURSE PRACTITIONER

## 2021-05-21 PROCEDURE — 80053 COMPREHEN METABOLIC PANEL: CPT

## 2021-05-21 PROCEDURE — 1090F PRES/ABSN URINE INCON ASSESS: CPT | Performed by: NURSE PRACTITIONER

## 2021-05-21 PROCEDURE — 85025 COMPLETE CBC W/AUTO DIFF WBC: CPT

## 2021-05-21 PROCEDURE — G8420 CALC BMI NORM PARAMETERS: HCPCS | Performed by: NURSE PRACTITIONER

## 2021-05-21 PROCEDURE — 3288F FALL RISK ASSESSMENT DOCD: CPT | Performed by: NURSE PRACTITIONER

## 2021-05-21 PROCEDURE — G8427 DOCREV CUR MEDS BY ELIG CLIN: HCPCS | Performed by: NURSE PRACTITIONER

## 2021-05-21 RX ORDER — TRAMADOL HYDROCHLORIDE 50 MG/1
50 TABLET ORAL NIGHTLY PRN
Qty: 30 TABLET | Refills: 0 | Status: SHIPPED | OUTPATIENT
Start: 2021-05-21 | End: 2021-05-31 | Stop reason: SDUPTHER

## 2021-05-21 SDOH — ECONOMIC STABILITY: FOOD INSECURITY: WITHIN THE PAST 12 MONTHS, YOU WORRIED THAT YOUR FOOD WOULD RUN OUT BEFORE YOU GOT MONEY TO BUY MORE.: NEVER TRUE

## 2021-05-21 ASSESSMENT — PATIENT HEALTH QUESTIONNAIRE - PHQ9
SUM OF ALL RESPONSES TO PHQ QUESTIONS 1-9: 0
SUM OF ALL RESPONSES TO PHQ QUESTIONS 1-9: 0
1. LITTLE INTEREST OR PLEASURE IN DOING THINGS: 0

## 2021-05-21 ASSESSMENT — SOCIAL DETERMINANTS OF HEALTH (SDOH): HOW HARD IS IT FOR YOU TO PAY FOR THE VERY BASICS LIKE FOOD, HOUSING, MEDICAL CARE, AND HEATING?: NOT HARD AT ALL

## 2021-05-21 NOTE — PROGRESS NOTES
Neftali Fox  1935      HPI:  Chief Complaint   Patient presents with    Shoulder Pain     therapy , unable to do at home    Wrist Pain    Arthritis       Pt states she was \"not good yesterday\". She couldn't get up yesterday. Difficult to sit to stand. C/o fatigue. Energy is low. Appetite is low. No dysphagia. Started Mobic in 4/21 - thinks this helped a little. Tylenol ES 3:30PM afternoon. Tremors. Happening for a over a year. Propranolol is not helping. Does not affect eating or ADLs. Insomnia. Waking up 3-4 times a night from shoulder pains. /72 (Site: Left Upper Arm, Position: Sitting, Cuff Size: Large Adult)   Pulse 76   Temp 97.9 °F (36.6 °C) (Temporal)   Wt 136 lb (61.7 kg)   SpO2 99%   BMI 23.34 kg/m²     Prior to Visit Medications    Medication Sig Taking? Authorizing Provider   traMADol (ULTRAM) 50 MG tablet Take 1 tablet by mouth nightly as needed for Pain for up to 30 days. Intended supply: 3 days. Take lowest dose possible to manage pain Yes DEBBIE Capellan - CNP   naproxen (NAPROSYN) 500 MG tablet TAKE 1 TABLET BY MOUTH TWICE A DAY WITH MEALS Yes Gerri Oswald MD   meloxicam (MOBIC) 15 MG tablet TAKE 1 TABLET BY MOUTH DAILY WITH FOOD IN THE MORNING Yes Gerri Oswald MD   propranolol (INDERAL) 10 MG tablet Take 1 tablet by mouth 2 times daily Yes DEBBIE Capellan CNP   aspirin 81 MG tablet Take 81 mg by mouth daily Yes Historical Provider, MD   Multiple Vitamins-Minerals (ICAPS) TABS Take  by mouth. Yes Historical Provider, MD   calcium carbonate (OSCAL) 500 MG TABS tablet Take 1,200 mg by mouth daily.  Yes Historical Provider, MD     Family History   Problem Relation Age of Onset    Heart Defect Mother     Prostate Cancer Father      Social History     Socioeconomic History    Marital status:      Spouse name: Not on file    Number of children: Not on file    Years of education: Not on file    Highest education level: Not on file for dizziness, weakness and headaches. Hematological: Negative for adenopathy. Psychiatric/Behavioral: Negative for sleep disturbance and suicidal ideas. Physical Exam  Vitals reviewed. HENT:      Head: Normocephalic. Neck:      Vascular: No carotid bruit. Cardiovascular:      Rate and Rhythm: Normal rate and regular rhythm. Pulses: Normal pulses. Heart sounds: Normal heart sounds. Pulmonary:      Effort: Pulmonary effort is normal.      Breath sounds: Normal breath sounds. Abdominal:      General: Abdomen is flat. Bowel sounds are normal.      Palpations: There is no mass. Tenderness: There is no abdominal tenderness. Hernia: No hernia is present. Musculoskeletal:      Right lower leg: No edema. Left lower leg: No edema. Neurological:      General: No focal deficit present. Mental Status: She is alert and oriented to person, place, and time. Comments: Head and upper extremity tremors bilateral   Psychiatric:         Mood and Affect: Mood normal.         Thought Content: Thought content normal.         Judgment: Judgment normal.         Assessment:     1. Chronic pain of both shoulders  Controlled Substance Monitoring:    Acute and Chronic Pain Monitoring:   RX Monitoring 4/25/2019   Attestation -   Acute Pain Prescriptions Not required given exclusionary diagnoses. .. Periodic Controlled Substance Monitoring -       Educated pt on using Tylenol and Tramadol at night. Pt aware of side effects and will only use as needed. - traMADol (ULTRAM) 50 MG tablet; Take 1 tablet by mouth nightly as needed for Pain for up to 30 days. Intended supply: 3 days. Take lowest dose possible to manage pain  Dispense: 30 tablet; Refill: 0    2. Tremor  Monitor, stable    3. Insomnia due to medical condition  Monitor, enc good sleep hygiene. Hope to see improvement in sleep with pain mgmt    4. Unintentional weight loss  Order imaging and labs.    Enc higher protein and fat intake when she does eat. - Comprehensive Metabolic Panel; Future  - CT ABDOMEN PELVIS W WO CONTRAST Additional Contrast? Radiologist Recommendation; Future  - CBC Auto Differential; Future    5. Loss of appetite  Order imaging and labs. - Comprehensive Metabolic Panel; Future  - CT ABDOMEN PELVIS W WO CONTRAST Additional Contrast? Radiologist Recommendation; Future  - CBC Auto Differential; Future    6. Fatigue, unspecified type  - CT ABDOMEN PELVIS W WO CONTRAST Additional Contrast? Radiologist Recommendation; Future      Plan:    See above plan. Return in about 3 months (around 8/21/2021) for IO, 30 min appt, arthralgia, tremor.     Ahmed Alpers, APRN - CNP

## 2021-05-21 NOTE — PATIENT INSTRUCTIONS
Tylenol ES 2 in morning, 2 in afternoon and 2 at dinner. Take Tramadol at night before bed. Stop taking Mobic in the morning. Get abdominal Cat Scan.  [phone] to schedule    Blood work today

## 2021-05-23 DIAGNOSIS — R74.8 ABNORMAL GGT TEST: Primary | ICD-10-CM

## 2021-05-24 ASSESSMENT — ENCOUNTER SYMPTOMS
DIARRHEA: 0
SORE THROAT: 0
TROUBLE SWALLOWING: 1
SINUS PRESSURE: 0
NAUSEA: 0
VOMITING: 0
COUGH: 0
FACIAL SWELLING: 0

## 2021-05-27 ENCOUNTER — HOSPITAL ENCOUNTER (OUTPATIENT)
Dept: ULTRASOUND IMAGING | Age: 86
Discharge: HOME | End: 2021-05-27
Payer: MEDICARE

## 2021-05-27 DIAGNOSIS — R74.8 ABNORMAL GGT TEST: ICD-10-CM

## 2021-05-27 PROCEDURE — 76705 ECHO EXAM OF ABDOMEN: CPT

## 2021-05-28 ENCOUNTER — TELEPHONE (OUTPATIENT)
Dept: INTERNAL MEDICINE CLINIC | Age: 86
End: 2021-05-28

## 2021-05-28 DIAGNOSIS — R74.8 ABNORMAL GGT TEST: Primary | ICD-10-CM

## 2021-05-28 DIAGNOSIS — M25.512 CHRONIC PAIN OF BOTH SHOULDERS: ICD-10-CM

## 2021-05-28 DIAGNOSIS — M25.511 CHRONIC PAIN OF BOTH SHOULDERS: ICD-10-CM

## 2021-05-28 DIAGNOSIS — G89.29 CHRONIC PAIN OF BOTH SHOULDERS: ICD-10-CM

## 2021-05-28 NOTE — TELEPHONE ENCOUNTER
Can you please look at the script for the Ultram you sent in on 5/21/21  Amount is unclear for 3 days and 30 tabs . The pharmacy will not give medication to patient until cleared up.  [phone]

## 2021-05-28 NOTE — TELEPHONE ENCOUNTER
Discussed with Shea. Marbin Johnson is supposedly checking in on her messages so will defer prescription details.  It looks as if the prescription was meant to be a short supply so I do not feel 30 tablets to be correct

## 2021-05-31 RX ORDER — TRAMADOL HYDROCHLORIDE 50 MG/1
50 TABLET ORAL NIGHTLY PRN
Qty: 30 TABLET | Refills: 0 | Status: SHIPPED | OUTPATIENT
Start: 2021-05-31 | End: 2021-06-30

## 2021-06-01 ENCOUNTER — TELEPHONE (OUTPATIENT)
Dept: ADMINISTRATIVE | Age: 86
End: 2021-06-01

## 2021-06-11 DIAGNOSIS — M19.011 PRIMARY OSTEOARTHRITIS OF BOTH SHOULDERS: ICD-10-CM

## 2021-06-11 DIAGNOSIS — M19.012 PRIMARY OSTEOARTHRITIS OF BOTH SHOULDERS: ICD-10-CM

## 2021-06-11 RX ORDER — MELOXICAM 15 MG/1
TABLET ORAL
Qty: 30 TABLET | Refills: 0 | Status: SHIPPED | OUTPATIENT
Start: 2021-06-11 | End: 2021-07-07

## 2021-06-11 NOTE — TELEPHONE ENCOUNTER
Refill request for meloxicam  medication. [facility]       Last visit - 5-     Pending visit - 8-    Last refill -5-8-2021      Medication Contract signed -PDMP Monitoring:    Last PDMP Norbert Abad as Reviewed [facility]):  Review User Review Instant Review Result   LUKE HANNA 5/24/2021  7:37 AM Reviewed PDMP [1]     [unfilled]  Urine Drug Screenings (1 yr)    No resulted procedures found.        Medication Contract and Consent for Opioid Use Documents Filed     Patient Documents       Type of Document Status Date Received Received By Description     Medication Contract Received 5/21/2021  4:49 PM [facility]                 Last Joseluis bello-         Additional Comments

## 2021-06-16 ENCOUNTER — PATIENT MESSAGE (OUTPATIENT)
Dept: INTERNAL MEDICINE CLINIC | Age: 86
End: 2021-06-16

## 2021-06-16 NOTE — TELEPHONE ENCOUNTER
From: Nanci Moralez  To: DEBBIE Chau - CNP  Sent: 6/16/2021 1:50 PM EDT  Subject: Non-Urgent Medical Question    Yesterday evening , received a recorded message about scheduling a test. The message began before I had a chance to get the phone up to my ear so I do not know what type of test. A phone number as given two times but each time it was so fast that I could not write it down.  So, I need the information in order to schedule the test.

## 2021-06-22 ENCOUNTER — TELEPHONE (OUTPATIENT)
Dept: INTERNAL MEDICINE CLINIC | Age: 86
End: 2021-06-22

## 2021-06-22 DIAGNOSIS — R53.83 FATIGUE, UNSPECIFIED TYPE: ICD-10-CM

## 2021-06-22 DIAGNOSIS — R63.4 UNINTENTIONAL WEIGHT LOSS: ICD-10-CM

## 2021-06-22 DIAGNOSIS — R74.8 ABNORMAL GGT TEST: Primary | ICD-10-CM

## 2021-06-22 DIAGNOSIS — R63.0 LOSS OF APPETITE: ICD-10-CM

## 2021-06-22 NOTE — TELEPHONE ENCOUNTER
OK to wait until Wednesday   Per radiology, the order for CT scan with and without contrast needs to be changed to CT abdomen and Pelvis with contrast only.   Needs new order

## 2021-06-24 ENCOUNTER — HOSPITAL ENCOUNTER (OUTPATIENT)
Age: 86
Discharge: HOME | End: 2021-06-24
Payer: MEDICARE

## 2021-06-24 ENCOUNTER — HOSPITAL ENCOUNTER (OUTPATIENT)
Dept: CT IMAGING | Age: 86
Discharge: HOME | End: 2021-06-24
Payer: MEDICARE

## 2021-06-24 DIAGNOSIS — R53.83 FATIGUE, UNSPECIFIED TYPE: ICD-10-CM

## 2021-06-24 DIAGNOSIS — R74.8 ABNORMAL GGT TEST: ICD-10-CM

## 2021-06-24 DIAGNOSIS — R63.4 UNINTENTIONAL WEIGHT LOSS: ICD-10-CM

## 2021-06-24 DIAGNOSIS — R63.0 LOSS OF APPETITE: ICD-10-CM

## 2021-06-24 LAB
A/G RATIO: 1.3 (ref 1.1–2.2)
ALBUMIN SERPL-MCNC: 4.1 G/DL (ref 3.4–5)
ALP BLD-CCNC: 324 U/L (ref 40–129)
ALT SERPL-CCNC: 45 U/L (ref 10–40)
ANION GAP SERPL CALCULATED.3IONS-SCNC: 10 MMOL/L (ref 3–16)
AST SERPL-CCNC: 63 U/L (ref 15–37)
BILIRUB SERPL-MCNC: 0.3 MG/DL (ref 0–1)
BUN BLDV-MCNC: 15 MG/DL (ref 7–20)
CALCIUM SERPL-MCNC: 9.5 MG/DL (ref 8.3–10.6)
CHLORIDE BLD-SCNC: 102 MMOL/L (ref 99–110)
CO2: 26 MMOL/L (ref 21–32)
CREAT SERPL-MCNC: 0.5 MG/DL (ref 0.6–1.2)
GFR AFRICAN AMERICAN: >60
GFR NON-AFRICAN AMERICAN: >60
GLOBULIN: 3.1 G/DL
GLUCOSE BLD-MCNC: 91 MG/DL (ref 70–99)
POTASSIUM SERPL-SCNC: 4.8 MMOL/L (ref 3.5–5.1)
SODIUM BLD-SCNC: 138 MMOL/L (ref 136–145)
TOTAL PROTEIN: 7.2 G/DL (ref 6.4–8.2)

## 2021-06-24 PROCEDURE — 80053 COMPREHEN METABOLIC PANEL: CPT

## 2021-06-24 PROCEDURE — 74177 CT ABD & PELVIS W/CONTRAST: CPT

## 2021-06-24 PROCEDURE — 36415 COLL VENOUS BLD VENIPUNCTURE: CPT

## 2021-06-24 PROCEDURE — 6360000004 HC RX CONTRAST MEDICATION: Performed by: NURSE PRACTITIONER

## 2021-06-24 RX ADMIN — IOPAMIDOL 75 ML: 755 INJECTION, SOLUTION INTRAVENOUS at 14:54

## 2021-06-24 RX ADMIN — IOHEXOL 50 ML: 240 INJECTION, SOLUTION INTRATHECAL; INTRAVASCULAR; INTRAVENOUS; ORAL at 14:54

## 2021-07-29 ENCOUNTER — TELEPHONE (OUTPATIENT)
Dept: INTERNAL MEDICINE CLINIC | Age: 86
End: 2021-07-29

## 2021-07-29 NOTE — TELEPHONE ENCOUNTER
VERONA:  Received a call from Irasema Eller [phone] at [address] [facility]    Patient was in for appt there and BP was 110/69, and then shortly later 70/40. She was sitting for both readings. She appeared shaky, SOB, had diaphoresis. She was advised to go to the  ER, and patient refused stating that this had been going on for 2 and a half months.   She was willing to make an appointment with our office and she will see Radha Glover tomorrow 30th  at 2:20

## 2021-07-30 ENCOUNTER — OFFICE VISIT (OUTPATIENT)
Dept: INTERNAL MEDICINE CLINIC | Age: 86
End: 2021-07-30
Payer: MEDICARE

## 2021-07-30 VITALS
BODY MASS INDEX: 23 KG/M2 | DIASTOLIC BLOOD PRESSURE: 72 MMHG | HEART RATE: 75 BPM | SYSTOLIC BLOOD PRESSURE: 102 MMHG | TEMPERATURE: 97 F | OXYGEN SATURATION: 92 % | WEIGHT: 134 LBS

## 2021-07-30 DIAGNOSIS — I95.9 HYPOTENSION, UNSPECIFIED HYPOTENSION TYPE: Primary | ICD-10-CM

## 2021-07-30 DIAGNOSIS — G25.0 ESSENTIAL TREMOR: ICD-10-CM

## 2021-07-30 PROCEDURE — 1090F PRES/ABSN URINE INCON ASSESS: CPT | Performed by: NURSE PRACTITIONER

## 2021-07-30 PROCEDURE — 1036F TOBACCO NON-USER: CPT | Performed by: NURSE PRACTITIONER

## 2021-07-30 PROCEDURE — G8420 CALC BMI NORM PARAMETERS: HCPCS | Performed by: NURSE PRACTITIONER

## 2021-07-30 PROCEDURE — 99214 OFFICE O/P EST MOD 30 MIN: CPT | Performed by: NURSE PRACTITIONER

## 2021-07-30 PROCEDURE — 1123F ACP DISCUSS/DSCN MKR DOCD: CPT | Performed by: NURSE PRACTITIONER

## 2021-07-30 PROCEDURE — 4040F PNEUMOC VAC/ADMIN/RCVD: CPT | Performed by: NURSE PRACTITIONER

## 2021-07-30 PROCEDURE — G8427 DOCREV CUR MEDS BY ELIG CLIN: HCPCS | Performed by: NURSE PRACTITIONER

## 2021-07-30 NOTE — PROGRESS NOTES
07/30/21    Claude Hickman  80 y.o.  female      Chief Complaint   Patient presents with    Blood Pressure Check         HPI:   Pt who is followed by John Dawson was referred to GI for evaluation of ELF tests. While there yesterday BP had dropped so she was told to see her PCP. BP is WNL here today. She has significant issues with tremors which she and  state have been present since last Dec and are getting worse. She is on propranolol but does not see any improvement. Propranolol may be a factor in lower BP. 1. Tremor  Refer to neurology for evaluation and treatment    2. Hypotension, unspecified hypotension type  BP is low end of normal here today  Monitor    Continue to FU with GI for EL enzymes          /72   Pulse 75   Temp 97 °F (36.1 °C)   Wt 134 lb (60.8 kg)   SpO2 92%   BMI 23.00 kg/m²     Current Outpatient Medications   Medication Sig Dispense Refill    naproxen (NAPROSYN) 500 MG tablet TAKE 1 TABLET BY MOUTH TWICE A DAY WITH MEALS 60 tablet 3    propranolol (INDERAL) 10 MG tablet Take 1 tablet by mouth 2 times daily 90 tablet 3    aspirin 81 MG tablet Take 81 mg by mouth daily      Multiple Vitamins-Minerals (ICAPS) TABS Take  by mouth.  calcium carbonate (OSCAL) 500 MG TABS tablet Take 1,200 mg by mouth daily.  meloxicam (MOBIC) 15 MG tablet TAKE 1 TABLET BY MOUTH DAILY WITH FOOD IN THE MORNING (Patient not taking: Reported on 7/30/2021) 30 tablet 11     No current facility-administered medications for this visit.        Social History     Socioeconomic History    Marital status:      Spouse name: Not on file    Number of children: Not on file    Years of education: Not on file    Highest education level: Not on file   Occupational History    Not on file   Tobacco Use    Smoking status: Never Smoker    Smokeless tobacco: Never Used   Substance and Sexual Activity    Alcohol use: Not Currently     Comment: occasional    Drug use: No    Sexual activity: Not on file   Other Topics Concern    Not on file   Social History Narrative    Not on file     Social Determinants of Health     Financial Resource Strain: Low Risk     Difficulty of Paying Living Expenses: Not hard at all   Food Insecurity: No Food Insecurity    Worried About Running Out of Food in the Last Year: Never true    [address] in the Last Year: Never true   Transportation Needs:     Lack of Transportation (Medical):  Lack of Transportation (Non-Medical):    Physical Activity:     Days of Exercise per Week:     Minutes of Exercise per Session:    Stress:     Feeling of Stress :    Social Connections:     Frequency of Communication with Friends and Family:     Frequency of Social Gatherings with Friends and Family:     Attends Sabianist Services:     Active Member of Clubs or Organizations:     Attends Club or Organization Meetings:     Marital Status:    Intimate Partner Violence:     Fear of Current or Ex-Partner:     Emotionally Abused:     Physically Abused:     Sexually Abused:        Family History   Problem Relation Age of Onset    Heart Defect Mother     Prostate Cancer Father        Past Medical History:   Diagnosis Date    Arthritis     Cataract     H/O colonoscopy     Hyperlipidemia     Osteoporosis        Review of Systems    Physical Exam  Constitutional:       General: She is not in acute distress. Appearance: She is not diaphoretic. HENT:      Right Ear: External ear normal.      Left Ear: External ear normal.      Mouth/Throat:      Pharynx: No oropharyngeal exudate. Eyes:      General: No scleral icterus. Right eye: No discharge. Left eye: No discharge. Neck:      Thyroid: No thyromegaly. Cardiovascular:      Rate and Rhythm: Normal rate and regular rhythm. Heart sounds: Normal heart sounds. No murmur heard. No friction rub. No gallop.     Pulmonary:      Effort: Pulmonary effort is normal.      Breath sounds: Normal breath

## 2021-09-07 DIAGNOSIS — R25.1 TREMOR: ICD-10-CM

## 2021-09-07 NOTE — TELEPHONE ENCOUNTER
Refill request for propranolol medication.      Name of Pharmacy- [facility]      Last visit - 5/21/21     Pending visit - 10/11/21    Last refill -8/3/21      Medication Contract signed -   Last Oarrs ran-         Additional Comments

## 2021-09-08 RX ORDER — PROPRANOLOL HYDROCHLORIDE 10 MG/1
10 TABLET ORAL 2 TIMES DAILY
Qty: 180 TABLET | Refills: 1 | Status: SHIPPED | OUTPATIENT
Start: 2021-09-08 | End: 2021-10-11 | Stop reason: SDUPTHER

## 2021-10-11 ENCOUNTER — TELEPHONE (OUTPATIENT)
Dept: INTERNAL MEDICINE CLINIC | Age: 86
End: 2021-10-11

## 2021-10-11 ENCOUNTER — VIRTUAL VISIT (OUTPATIENT)
Dept: INTERNAL MEDICINE CLINIC | Age: 86
End: 2021-10-11
Payer: MEDICARE

## 2021-10-11 DIAGNOSIS — R25.1 TREMOR: Primary | ICD-10-CM

## 2021-10-11 DIAGNOSIS — R74.8 ABNORMAL GGT TEST: ICD-10-CM

## 2021-10-11 DIAGNOSIS — M19.012 PRIMARY OSTEOARTHRITIS OF BOTH SHOULDERS: ICD-10-CM

## 2021-10-11 DIAGNOSIS — M19.011 PRIMARY OSTEOARTHRITIS OF BOTH SHOULDERS: ICD-10-CM

## 2021-10-11 PROCEDURE — 99443 PR PHYS/QHP TELEPHONE EVALUATION 21-30 MIN: CPT | Performed by: NURSE PRACTITIONER

## 2021-10-11 RX ORDER — URSODIOL 300 MG/1
CAPSULE ORAL
COMMUNITY
Start: 2021-10-07 | End: 2022-09-26

## 2021-10-11 RX ORDER — PROPRANOLOL HYDROCHLORIDE 10 MG/1
10 TABLET ORAL 2 TIMES DAILY
Qty: 180 TABLET | Refills: 1 | Status: SHIPPED | OUTPATIENT
Start: 2021-10-11 | End: 2022-06-01 | Stop reason: SDUPTHER

## 2021-10-11 NOTE — TELEPHONE ENCOUNTER
[phone] (home)   No answer / no voicemail.     ----- Message from Rafaela Andre sent at 10/11/2021  9:47 AM EDT -----  Subject: Message to Provider    QUESTIONS  Information for Provider? patient is calling after having to cancel her   appt for today 10/11. She had to cancel due to lack of transportation. She   would like her PCP to call her. She needs to discuss somethings with her .     ---------------------------------------------------------------------------  --------------  CALL BACK INFO  What is the best way for the office to contact you? OK to leave message on   voicemail  Preferred Call Back Phone Number? [phone]  ---------------------------------------------------------------------------  --------------  SCRIPT ANSWERS  Relationship to Patient?  Self

## 2021-10-11 NOTE — PROGRESS NOTES
Lane Diallo is a 80 y.o. female evaluated via telephone on 10/11/2021. Consent:  She and/or health care decision maker is aware that that she may receive a bill for this telephone service, depending on her insurance coverage, and has provided verbal consent to proceed: Yes      Documentation:  I communicated with the patient and/or health care decision maker about shoulder pain. Details of this discussion including any medical advice provided:     Chronic Shoulder Pain. She has started Tramadol but she could not sleep well when taking medication so she d/c. Instead she started Tylenol 2 pills three times a day. She states she was getting good relief with shoulder pain and has decreased Tylenol to 2 pills at bedtime only. Failed mobic in the past.   Maintain Tylenol only at bedtime. Tremor. Tremors have occurred for over 1-2 years. Her tremors decrease at nighttime. If she feels nervous or anxious, the tremors worsen. Taking Propranolol morning and evening. Maintain Propranolol as she feels this has improved symptoms. Elevated liver function tests. She sees Dr Reema Lundy but has not seen him since this results returned 6/2021. Recommend f/u GI in regards to abnormal LFTs. Reviewed importance of using certain resources as her  is in hospital and she does not drive and has difficulty making meals. I affirm this is a Patient Initiated Episode with a Patient who has not had a related appointment within my department in the past 7 days or scheduled within the next 24 hours. Patient identification was verified at the start of the visit: Yes    Total Time: minutes: 21-30 minutes    The visit was conducted pursuant to the emergency declaration under the [address] [place] [address], [address] [place] [address] and the [employer] Act. Patient identification was verified, and a caregiver was present when appropriate.  The patient

## 2022-05-06 ENCOUNTER — TELEPHONE (OUTPATIENT)
Dept: INTERNAL MEDICINE CLINIC | Age: 87
End: 2022-05-06

## 2022-05-06 NOTE — TELEPHONE ENCOUNTER
I left a message on patient's voicemail for her to call back to reschedule 1/5/2022 appointment with Ahmet Luevano that was cancelled.

## 2022-05-09 NOTE — TELEPHONE ENCOUNTER
I left a message on patient's voicemail for her to call back to reschedule 1/5/2022 appointment with Samina Hernandes that was cancelled.  2nd call

## 2022-05-10 NOTE — TELEPHONE ENCOUNTER
Called and spoke to patient to reschedule her 1/5/22 appointment that she missed with [facility]. Patient states she will call us back to schedule. Right now she doesn't have a ride here to see Shahana Pantoja.   She will work on a ride and call back

## 2022-06-01 ENCOUNTER — OFFICE VISIT (OUTPATIENT)
Dept: INTERNAL MEDICINE CLINIC | Age: 87
End: 2022-06-01
Payer: MEDICARE

## 2022-06-01 ENCOUNTER — HOSPITAL ENCOUNTER (OUTPATIENT)
Age: 87
Discharge: HOME | End: 2022-06-01
Payer: MEDICARE

## 2022-06-01 VITALS
HEART RATE: 72 BPM | SYSTOLIC BLOOD PRESSURE: 128 MMHG | OXYGEN SATURATION: 98 % | BODY MASS INDEX: 20.77 KG/M2 | DIASTOLIC BLOOD PRESSURE: 64 MMHG | WEIGHT: 121 LBS

## 2022-06-01 DIAGNOSIS — R25.1 TREMOR: Primary | ICD-10-CM

## 2022-06-01 DIAGNOSIS — F33.0 MILD EPISODE OF RECURRENT MAJOR DEPRESSIVE DISORDER (HCC): ICD-10-CM

## 2022-06-01 DIAGNOSIS — H91.93 BILATERAL HEARING LOSS, UNSPECIFIED HEARING LOSS TYPE: ICD-10-CM

## 2022-06-01 DIAGNOSIS — R63.4 UNINTENTIONAL WEIGHT LOSS: ICD-10-CM

## 2022-06-01 DIAGNOSIS — E78.2 MIXED HYPERLIPIDEMIA: ICD-10-CM

## 2022-06-01 DIAGNOSIS — R25.1 TREMOR: ICD-10-CM

## 2022-06-01 LAB
A/G RATIO: 1.9 (ref 1.1–2.2)
ALBUMIN SERPL-MCNC: 4.6 G/DL (ref 3.4–5)
ALP BLD-CCNC: 79 U/L (ref 40–129)
ALT SERPL-CCNC: 17 U/L (ref 10–40)
ANION GAP SERPL CALCULATED.3IONS-SCNC: 14 MMOL/L (ref 3–16)
AST SERPL-CCNC: 26 U/L (ref 15–37)
BILIRUB SERPL-MCNC: 0.5 MG/DL (ref 0–1)
BUN BLDV-MCNC: 14 MG/DL (ref 7–20)
CALCIUM SERPL-MCNC: 9.5 MG/DL (ref 8.3–10.6)
CHLORIDE BLD-SCNC: 103 MMOL/L (ref 99–110)
CO2: 25 MMOL/L (ref 21–32)
CREAT SERPL-MCNC: 0.6 MG/DL (ref 0.6–1.2)
GFR AFRICAN AMERICAN: >60
GFR NON-AFRICAN AMERICAN: >60
GLUCOSE BLD-MCNC: 93 MG/DL (ref 70–99)
POTASSIUM SERPL-SCNC: 4.4 MMOL/L (ref 3.5–5.1)
SODIUM BLD-SCNC: 142 MMOL/L (ref 136–145)
TOTAL PROTEIN: 7 G/DL (ref 6.4–8.2)

## 2022-06-01 PROCEDURE — 36415 COLL VENOUS BLD VENIPUNCTURE: CPT

## 2022-06-01 PROCEDURE — 1123F ACP DISCUSS/DSCN MKR DOCD: CPT | Performed by: NURSE PRACTITIONER

## 2022-06-01 PROCEDURE — G8427 DOCREV CUR MEDS BY ELIG CLIN: HCPCS | Performed by: NURSE PRACTITIONER

## 2022-06-01 PROCEDURE — G8420 CALC BMI NORM PARAMETERS: HCPCS | Performed by: NURSE PRACTITIONER

## 2022-06-01 PROCEDURE — 99214 OFFICE O/P EST MOD 30 MIN: CPT | Performed by: NURSE PRACTITIONER

## 2022-06-01 PROCEDURE — 1090F PRES/ABSN URINE INCON ASSESS: CPT | Performed by: NURSE PRACTITIONER

## 2022-06-01 PROCEDURE — 80053 COMPREHEN METABOLIC PANEL: CPT

## 2022-06-01 PROCEDURE — 1036F TOBACCO NON-USER: CPT | Performed by: NURSE PRACTITIONER

## 2022-06-01 RX ORDER — CITALOPRAM 10 MG/1
20 TABLET ORAL DAILY
Qty: 30 TABLET | Refills: 2 | Status: SHIPPED | OUTPATIENT
Start: 2022-06-01 | End: 2022-09-12

## 2022-06-01 RX ORDER — PROPRANOLOL HYDROCHLORIDE 10 MG/1
10 TABLET ORAL 2 TIMES DAILY
Qty: 180 TABLET | Refills: 1 | Status: SHIPPED | OUTPATIENT
Start: 2022-06-01 | End: 2022-09-26 | Stop reason: SDUPTHER

## 2022-06-01 NOTE — PATIENT INSTRUCTIONS
> Increase fat and protein in diet as much as you can. Milkshakes, eggs, meat/deli meat.  Eat meals with family or neighbors    > Consider looking at [facility] or [facility] or [address] for potential nursing care for future    > Start Celexa 1 pill at nighttime to help with some anxiety

## 2022-06-01 NOTE — PROGRESS NOTES
Sherran Lefort  1935        Chief Complaint   Patient presents with    GI Problem     belly is doing better     Otalgia     hearing off an on , R >L         Assessment/Plan:     1. Tremor  Maintain medication. Stable. Check labs. - propranolol (INDERAL) 10 MG tablet; Take 1 tablet by mouth 2 times daily  Dispense: 180 tablet; Refill: 1  - Comprehensive Metabolic Panel; Future    2. Unintentional weight loss  Monitor closely. Reviewed importance of eating more meals with family and higher protein. Higher fat intake. 3. Mixed hyperlipidemia  Monitor. 4. Mild episode of recurrent major depressive disorder (HCC)  Start Celexa 10mg daily. Educated pt on medication. Monitor closely. - citalopram (CELEXA) 10 MG tablet; Take 2 tablets by mouth daily ANXIETY  Dispense: 30 tablet; Refill: 2    5. Bilateral hearing loss, unspecified hearing loss type  Set up appointment with Neil Guerin. - Keegan Sanchez, Audiology, [facility]    Consider long term care facility in future - given names of nearby locations. Return for Sept weight check, 30 min appt. HPI:      Pt states her vision is worsening. She does not see provider any longer at [facility] as benefit would be minimal regarding further treatment. She lives alone with her dog. She gets meals on wheels. She has good neighbors. Lives in a ranch, one story living. , previous pt at [facility], passed 12/2021. Pt states tremor present. Mild. Propranolol BID. Weight loss since  passed but is trying to eat small, frequent meals. Does eat some meals with family. Tearful when discussing 's passing. /64 (Site: Left Upper Arm, Position: Sitting, Cuff Size: Medium Adult)   Pulse 72   Wt 121 lb (54.9 kg)   SpO2 98%   BMI 20.77 kg/m²     Prior to Visit Medications    Medication Sig Taking?  Authorizing Provider   propranolol (INDERAL) 10 MG tablet Take 1 tablet by mouth 2 times daily Yes Rj Bald DEBBIE Cabrera - CNP   citalopram (CELEXA) 10 MG tablet Take 2 tablets by mouth daily ANXIETY Yes DEBBIE Shipley CNP   ursodiol (ACTIGALL) 300 MG capsule  Yes Historical Provider, MD   naproxen (NAPROSYN) 500 MG tablet TAKE 1 TABLET BY MOUTH TWICE A DAY WITH MEALS Yes Danica Bermudez MD   aspirin 81 MG tablet Take 81 mg by mouth daily Yes Historical Provider, MD   Multiple Vitamins-Minerals (ICAPS) TABS Take  by mouth. Yes Historical Provider, MD   calcium carbonate (OSCAL) 500 MG TABS tablet Take 1,200 mg by mouth daily. Yes Historical Provider, MD     Family History   Problem Relation Age of Onset    Heart Defect Mother     Prostate Cancer Father      Social History     Socioeconomic History    Marital status:      Spouse name: Not on file    Number of children: Not on file    Years of education: Not on file    Highest education level: Not on file   Occupational History    Not on file   Tobacco Use    Smoking status: Never Smoker    Smokeless tobacco: Never Used   Substance and Sexual Activity    Alcohol use: Not Currently     Comment: occasional    Drug use: No    Sexual activity: Not on file   Other Topics Concern    Not on file   Social History Narrative    Not on file     Social Determinants of Health     Financial Resource Strain:     Difficulty of Paying Living Expenses: Not on file   Food Insecurity:     Worried About Running Out of Food in the Last Year: Not on file    Jere of Food in the Last Year: Not on file   Transportation Needs:     Lack of Transportation (Medical): Not on file    Lack of Transportation (Non-Medical):  Not on file   Physical Activity:     Days of Exercise per Week: Not on file    Minutes of Exercise per Session: Not on file   Stress:     Feeling of Stress : Not on file   Social Connections:     Frequency of Communication with Friends and Family: Not on file    Frequency of Social Gatherings with Friends and Family: Not on file    Attends Scientologist Services: Not on file    Active Member of Clubs or Organizations: Not on file    Attends Club or Organization Meetings: Not on file    Marital Status: Not on file   Intimate Partner Violence:     Fear of Current or Ex-Partner: Not on file    Emotionally Abused: Not on file    Physically Abused: Not on file    Sexually Abused: Not on file   Housing Stability:     Unable to Pay for Housing in the Last Year: Not on file    Number of [place] in the Last Year: Not on file    Unstable Housing in the Last Year: Not on file       Review of Systems   Constitutional: Positive for unexpected weight change. Negative for appetite change, chills, fatigue and fever. HENT: Negative for congestion, ear discharge, ear pain, facial swelling, hearing loss, sinus pressure, sneezing and sore throat. Respiratory: Negative for cough. Cardiovascular: Negative for chest pain. Gastrointestinal: Negative for diarrhea, nausea and vomiting. Genitourinary: Negative for difficulty urinating, dysuria, hematuria and urgency. Musculoskeletal: Negative for arthralgias and gait problem. Neurological: Negative for dizziness, weakness and headaches. Hematological: Negative for adenopathy. Psychiatric/Behavioral: Positive for dysphoric mood. Negative for sleep disturbance and suicidal ideas. Physical Exam  Vitals reviewed. Constitutional:       Appearance: She is normal weight. HENT:      Head: Normocephalic. Right Ear: Tympanic membrane, ear canal and external ear normal.      Left Ear: Tympanic membrane, ear canal and external ear normal.   Neck:      Vascular: No carotid bruit. Cardiovascular:      Rate and Rhythm: Normal rate and regular rhythm. Heart sounds: Normal heart sounds. Pulmonary:      Effort: Pulmonary effort is normal.      Breath sounds: Normal breath sounds. Musculoskeletal:      Right lower leg: No edema. Left lower leg: No edema.    Neurological:      General: No focal deficit present. Mental Status: She is alert and oriented to person, place, and time. Psychiatric:         Mood and Affect: Mood normal.         Thought Content: Thought content normal.         Judgment: Judgment normal.             See above plan.          DEBBIE Sow - CNP

## 2022-06-02 ASSESSMENT — ENCOUNTER SYMPTOMS
VOMITING: 0
DIARRHEA: 0
NAUSEA: 0
SORE THROAT: 0
SINUS PRESSURE: 0
FACIAL SWELLING: 0
COUGH: 0

## 2022-09-10 DIAGNOSIS — F33.0 MILD EPISODE OF RECURRENT MAJOR DEPRESSIVE DISORDER (HCC): ICD-10-CM

## 2022-09-12 RX ORDER — CITALOPRAM 10 MG/1
TABLET ORAL
Qty: 30 TABLET | Refills: 2 | Status: SHIPPED | OUTPATIENT
Start: 2022-09-12 | End: 2022-09-26 | Stop reason: SDUPTHER

## 2022-09-12 NOTE — TELEPHONE ENCOUNTER
Refill request for citalopram  medication.      Name of Pharmacy- [facility]       Last visit - 6-1-2022     Pending visit - 9-    Last refill -6-1-2022      Medication Contract signed -   Richie bello-         Additional Comments

## 2022-09-26 ENCOUNTER — OFFICE VISIT (OUTPATIENT)
Dept: INTERNAL MEDICINE CLINIC | Age: 87
End: 2022-09-26
Payer: MEDICARE

## 2022-09-26 VITALS
OXYGEN SATURATION: 98 % | WEIGHT: 124.2 LBS | SYSTOLIC BLOOD PRESSURE: 118 MMHG | HEART RATE: 80 BPM | BODY MASS INDEX: 21.32 KG/M2 | DIASTOLIC BLOOD PRESSURE: 58 MMHG

## 2022-09-26 DIAGNOSIS — H35.30 MACULAR DEGENERATION, UNSPECIFIED LATERALITY, UNSPECIFIED TYPE: ICD-10-CM

## 2022-09-26 DIAGNOSIS — M25.512 CHRONIC PAIN OF BOTH SHOULDERS: Primary | ICD-10-CM

## 2022-09-26 DIAGNOSIS — R25.1 TREMOR: ICD-10-CM

## 2022-09-26 DIAGNOSIS — Z23 NEED FOR INFLUENZA VACCINATION: ICD-10-CM

## 2022-09-26 DIAGNOSIS — F33.0 MILD EPISODE OF RECURRENT MAJOR DEPRESSIVE DISORDER (HCC): ICD-10-CM

## 2022-09-26 DIAGNOSIS — G89.29 CHRONIC PAIN OF BOTH SHOULDERS: Primary | ICD-10-CM

## 2022-09-26 DIAGNOSIS — M25.511 CHRONIC PAIN OF BOTH SHOULDERS: Primary | ICD-10-CM

## 2022-09-26 PROCEDURE — 1090F PRES/ABSN URINE INCON ASSESS: CPT | Performed by: NURSE PRACTITIONER

## 2022-09-26 PROCEDURE — 1036F TOBACCO NON-USER: CPT | Performed by: NURSE PRACTITIONER

## 2022-09-26 PROCEDURE — G8427 DOCREV CUR MEDS BY ELIG CLIN: HCPCS | Performed by: NURSE PRACTITIONER

## 2022-09-26 PROCEDURE — G8420 CALC BMI NORM PARAMETERS: HCPCS | Performed by: NURSE PRACTITIONER

## 2022-09-26 PROCEDURE — 1123F ACP DISCUSS/DSCN MKR DOCD: CPT | Performed by: NURSE PRACTITIONER

## 2022-09-26 PROCEDURE — 99214 OFFICE O/P EST MOD 30 MIN: CPT | Performed by: NURSE PRACTITIONER

## 2022-09-26 PROCEDURE — 90694 VACC AIIV4 NO PRSRV 0.5ML IM: CPT | Performed by: NURSE PRACTITIONER

## 2022-09-26 PROCEDURE — G0008 ADMIN INFLUENZA VIRUS VAC: HCPCS | Performed by: NURSE PRACTITIONER

## 2022-09-26 RX ORDER — NAPROXEN 500 MG/1
TABLET ORAL
Qty: 60 TABLET | Refills: 3 | Status: CANCELLED | OUTPATIENT
Start: 2022-09-26

## 2022-09-26 RX ORDER — PROPRANOLOL HYDROCHLORIDE 10 MG/1
10 TABLET ORAL 2 TIMES DAILY
Qty: 180 TABLET | Refills: 1 | Status: SHIPPED | OUTPATIENT
Start: 2022-09-26

## 2022-09-26 RX ORDER — CALCIUM CARBONATE 500(1250)
1200 TABLET ORAL DAILY
Status: CANCELLED | OUTPATIENT
Start: 2022-09-26

## 2022-09-26 RX ORDER — CITALOPRAM 10 MG/1
TABLET ORAL
Qty: 90 TABLET | Refills: 1 | Status: SHIPPED | OUTPATIENT
Start: 2022-09-26

## 2022-09-26 SDOH — ECONOMIC STABILITY: FOOD INSECURITY: WITHIN THE PAST 12 MONTHS, YOU WORRIED THAT YOUR FOOD WOULD RUN OUT BEFORE YOU GOT MONEY TO BUY MORE.: NEVER TRUE

## 2022-09-26 SDOH — ECONOMIC STABILITY: FOOD INSECURITY: WITHIN THE PAST 12 MONTHS, THE FOOD YOU BOUGHT JUST DIDN'T LAST AND YOU DIDN'T HAVE MONEY TO GET MORE.: NEVER TRUE

## 2022-09-26 ASSESSMENT — PATIENT HEALTH QUESTIONNAIRE - PHQ9
9. THOUGHTS THAT YOU WOULD BE BETTER OFF DEAD, OR OF HURTING YOURSELF: 0
5. POOR APPETITE OR OVEREATING: 0
4. FEELING TIRED OR HAVING LITTLE ENERGY: 1
3. TROUBLE FALLING OR STAYING ASLEEP: 1
SUM OF ALL RESPONSES TO PHQ QUESTIONS 1-9: 6
1. LITTLE INTEREST OR PLEASURE IN DOING THINGS: 0
SUM OF ALL RESPONSES TO PHQ QUESTIONS 1-9: 6
8. MOVING OR SPEAKING SO SLOWLY THAT OTHER PEOPLE COULD HAVE NOTICED. OR THE OPPOSITE, BEING SO FIGETY OR RESTLESS THAT YOU HAVE BEEN MOVING AROUND A LOT MORE THAN USUAL: 3
6. FEELING BAD ABOUT YOURSELF - OR THAT YOU ARE A FAILURE OR HAVE LET YOURSELF OR YOUR FAMILY DOWN: 0
7. TROUBLE CONCENTRATING ON THINGS, SUCH AS READING THE NEWSPAPER OR WATCHING TELEVISION: 1
10. IF YOU CHECKED OFF ANY PROBLEMS, HOW DIFFICULT HAVE THESE PROBLEMS MADE IT FOR YOU TO DO YOUR WORK, TAKE CARE OF THINGS AT HOME, OR GET ALONG WITH OTHER PEOPLE: 0
SUM OF ALL RESPONSES TO PHQ QUESTIONS 1-9: 6
SUM OF ALL RESPONSES TO PHQ QUESTIONS 1-9: 6
SUM OF ALL RESPONSES TO PHQ9 QUESTIONS 1 & 2: 0
2. FEELING DOWN, DEPRESSED OR HOPELESS: 0

## 2022-09-26 ASSESSMENT — ENCOUNTER SYMPTOMS
COUGH: 0
SORE THROAT: 0
SINUS PRESSURE: 0
FACIAL SWELLING: 0
VOMITING: 0
NAUSEA: 0
DIARRHEA: 0

## 2022-09-26 ASSESSMENT — SOCIAL DETERMINANTS OF HEALTH (SDOH): HOW HARD IS IT FOR YOU TO PAY FOR THE VERY BASICS LIKE FOOD, HOUSING, MEDICAL CARE, AND HEATING?: NOT HARD AT ALL

## 2022-09-26 NOTE — PROGRESS NOTES
Cady Burton  1935        Chief Complaint   Patient presents with    Tremors     Weight check        Assessment/Plan:     1. Chronic pain of both shoulders  Monitor, use Aleve as needed. 2. Tremor  Stable. Maintain Propranolol    - propranolol (INDERAL) 10 MG tablet; Take 1 tablet by mouth 2 times daily  Dispense: 180 tablet; Refill: 1    3. Macular degeneration, unspecified laterality, unspecified type  Stable, denies worsening. 4. Mild episode of recurrent major depressive disorder (HCC)  Maintain Celexa, improved since last OV. Watch weight closely    - citalopram (CELEXA) 10 MG tablet; TAKE 2 TABLETS BY MOUTH DAILY FOR ANXIETY  Dispense: 90 tablet; Refill: 1    5. Need for influenza vaccination  Pt tolerated well    Enc to check into [facility] for at home. Return for 4 months AWV, weight check. HPI:      Pt states her vision is worsening. She does not see provider any longer at [facility] as benefit would be minimal regarding further treatment. She lives alone with her dog. She gets meals on wheels. She has good neighbors. Lives in a ranch, one story living. , previous pt at [facility], passed 12/2021. Pt states tremor present. Mild. Propranolol BID. She has noticed improvement since starting Celexa. Feels she is doing ok getting around her home. Eating better since her  passed but sometimes meals are \"off\". Family visits often and she has good neighbors. BP (!) 118/58 (Site: Left Upper Arm, Position: Sitting, Cuff Size: Large Adult)   Pulse 80   Wt 124 lb 3.2 oz (56.3 kg)   SpO2 98%   BMI 21.32 kg/m²     Prior to Visit Medications    Medication Sig Taking?  Authorizing Provider   citalopram (CELEXA) 10 MG tablet TAKE 2 TABLETS BY MOUTH DAILY FOR ANXIETY Yes DEBBIE Dobson - CNP   propranolol (INDERAL) 10 MG tablet Take 1 tablet by mouth 2 times daily Yes DEBBIE Dosbon - CNP   naproxen (NAPROSYN) 500 MG tablet TAKE 1 TABLET BY MOUTH TWICE A DAY WITH MEALS Yes Vijaya Yip MD   aspirin 81 MG tablet Take 81 mg by mouth daily Yes Historical Provider, MD   Multiple Vitamins-Minerals (ICAPS) TABS Take  by mouth. Yes Historical Provider, MD   calcium carbonate (OSCAL) 500 MG TABS tablet Take 1,200 mg by mouth daily. Yes Historical Provider, MD     Family History   Problem Relation Age of Onset    Heart Defect Mother     Prostate Cancer Father      Social History     Socioeconomic History    Marital status:      Spouse name: Not on file    Number of children: Not on file    Years of education: Not on file    Highest education level: Not on file   Occupational History    Not on file   Tobacco Use    Smoking status: Never    Smokeless tobacco: Never   Substance and Sexual Activity    Alcohol use: Not Currently     Comment: occasional    Drug use: No    Sexual activity: Not on file   Other Topics Concern    Not on file   Social History Narrative    Not on file     Social Determinants of Health     Financial Resource Strain: Low Risk     Difficulty of Paying Living Expenses: Not hard at all   Food Insecurity: No Food Insecurity    Worried About Running Out of Food in the Last Year: Never true    Ran Out of Food in the Last Year: Never true   Transportation Needs: Not on file   Physical Activity: Not on file   Stress: Not on file   Social Connections: Not on file   Intimate Partner Violence: Not on file   Housing Stability: Not on file       Review of Systems   Constitutional:  Negative for appetite change, chills, fatigue, fever and unexpected weight change. HENT:  Negative for congestion, ear discharge, ear pain, facial swelling, hearing loss, sinus pressure, sneezing and sore throat. Respiratory:  Negative for cough. Cardiovascular:  Negative for chest pain. Gastrointestinal:  Negative for diarrhea, nausea and vomiting. Genitourinary:  Negative for difficulty urinating, dysuria, hematuria and urgency.    Musculoskeletal:  Negative for arthralgias and gait problem. Neurological:  Positive for tremors. Negative for dizziness, weakness and headaches. Hematological:  Negative for adenopathy. Psychiatric/Behavioral:  Negative for sleep disturbance and suicidal ideas. Physical Exam  Vitals reviewed. Constitutional:       Appearance: She is normal weight. HENT:      Head: Normocephalic. Neck:      Vascular: No carotid bruit. Cardiovascular:      Rate and Rhythm: Normal rate and regular rhythm. Pulses: Normal pulses. Heart sounds: Normal heart sounds. Pulmonary:      Effort: Pulmonary effort is normal.      Breath sounds: Normal breath sounds. Musculoskeletal:      Right lower leg: No edema. Left lower leg: No edema. Neurological:      General: No focal deficit present. Mental Status: She is alert. Comments: B hand tremor, mild head tremor   Psychiatric:         Mood and Affect: Mood normal.         Thought Content: Thought content normal.         Judgment: Judgment normal.      Comments: Well groomed. Good eye contact. See above plan.          Jamie Cooley, APRN - CNP

## 2022-10-14 DIAGNOSIS — F33.0 MILD EPISODE OF RECURRENT MAJOR DEPRESSIVE DISORDER (HCC): ICD-10-CM

## 2022-11-17 DIAGNOSIS — F33.0 MILD EPISODE OF RECURRENT MAJOR DEPRESSIVE DISORDER (HCC): ICD-10-CM

## 2022-11-17 DIAGNOSIS — R25.1 TREMOR: ICD-10-CM

## 2022-11-17 RX ORDER — CITALOPRAM 10 MG/1
TABLET ORAL
Qty: 180 TABLET | Refills: 1 | Status: SHIPPED | OUTPATIENT
Start: 2022-11-17

## 2022-11-17 RX ORDER — PROPRANOLOL HYDROCHLORIDE 10 MG/1
10 TABLET ORAL 2 TIMES DAILY
Qty: 180 TABLET | Refills: 1 | Status: SHIPPED | OUTPATIENT
Start: 2022-11-17

## 2022-11-17 NOTE — TELEPHONE ENCOUNTER
celexa  Last OV 9/26/22  Last fill date 9/26 (sig reads one BID, fill was only 90)  Pharmacy Changed to [address]  Next OV 01/27/23

## 2023-02-10 ENCOUNTER — OFFICE VISIT (OUTPATIENT)
Dept: INTERNAL MEDICINE CLINIC | Age: 88
End: 2023-02-10
Payer: MEDICARE

## 2023-02-10 VITALS
DIASTOLIC BLOOD PRESSURE: 60 MMHG | HEART RATE: 81 BPM | TEMPERATURE: 97.3 F | OXYGEN SATURATION: 96 % | SYSTOLIC BLOOD PRESSURE: 110 MMHG | WEIGHT: 127.6 LBS | BODY MASS INDEX: 21.9 KG/M2

## 2023-02-10 DIAGNOSIS — F33.0 MILD EPISODE OF RECURRENT MAJOR DEPRESSIVE DISORDER (HCC): ICD-10-CM

## 2023-02-10 DIAGNOSIS — R25.1 TREMOR: Primary | ICD-10-CM

## 2023-02-10 DIAGNOSIS — H35.30 MACULAR DEGENERATION, UNSPECIFIED LATERALITY, UNSPECIFIED TYPE: ICD-10-CM

## 2023-02-10 PROCEDURE — 1090F PRES/ABSN URINE INCON ASSESS: CPT | Performed by: NURSE PRACTITIONER

## 2023-02-10 PROCEDURE — 1036F TOBACCO NON-USER: CPT | Performed by: NURSE PRACTITIONER

## 2023-02-10 PROCEDURE — 99214 OFFICE O/P EST MOD 30 MIN: CPT | Performed by: NURSE PRACTITIONER

## 2023-02-10 PROCEDURE — G8420 CALC BMI NORM PARAMETERS: HCPCS | Performed by: NURSE PRACTITIONER

## 2023-02-10 PROCEDURE — 1123F ACP DISCUSS/DSCN MKR DOCD: CPT | Performed by: NURSE PRACTITIONER

## 2023-02-10 PROCEDURE — G8484 FLU IMMUNIZE NO ADMIN: HCPCS | Performed by: NURSE PRACTITIONER

## 2023-02-10 PROCEDURE — G8427 DOCREV CUR MEDS BY ELIG CLIN: HCPCS | Performed by: NURSE PRACTITIONER

## 2023-02-10 RX ORDER — PROPRANOLOL HYDROCHLORIDE 10 MG/1
10 TABLET ORAL 2 TIMES DAILY
Qty: 180 TABLET | Refills: 1 | Status: SHIPPED | OUTPATIENT
Start: 2023-02-10

## 2023-02-10 RX ORDER — CITALOPRAM 10 MG/1
TABLET ORAL
Qty: 180 TABLET | Refills: 1 | Status: SHIPPED | OUTPATIENT
Start: 2023-02-10

## 2023-02-10 SDOH — ECONOMIC STABILITY: HOUSING INSECURITY
IN THE LAST 12 MONTHS, WAS THERE A TIME WHEN YOU DID NOT HAVE A STEADY PLACE TO SLEEP OR SLEPT IN A SHELTER (INCLUDING NOW)?: NO

## 2023-02-10 SDOH — ECONOMIC STABILITY: INCOME INSECURITY: HOW HARD IS IT FOR YOU TO PAY FOR THE VERY BASICS LIKE FOOD, HOUSING, MEDICAL CARE, AND HEATING?: NOT VERY HARD

## 2023-02-10 SDOH — ECONOMIC STABILITY: FOOD INSECURITY: WITHIN THE PAST 12 MONTHS, YOU WORRIED THAT YOUR FOOD WOULD RUN OUT BEFORE YOU GOT MONEY TO BUY MORE.: NEVER TRUE

## 2023-02-10 SDOH — ECONOMIC STABILITY: FOOD INSECURITY: WITHIN THE PAST 12 MONTHS, THE FOOD YOU BOUGHT JUST DIDN'T LAST AND YOU DIDN'T HAVE MONEY TO GET MORE.: NEVER TRUE

## 2023-02-10 ASSESSMENT — PATIENT HEALTH QUESTIONNAIRE - PHQ9
SUM OF ALL RESPONSES TO PHQ QUESTIONS 1-9: 2
3. TROUBLE FALLING OR STAYING ASLEEP: 1
SUM OF ALL RESPONSES TO PHQ QUESTIONS 1-9: 2
5. POOR APPETITE OR OVEREATING: 0
2. FEELING DOWN, DEPRESSED OR HOPELESS: 0
8. MOVING OR SPEAKING SO SLOWLY THAT OTHER PEOPLE COULD HAVE NOTICED. OR THE OPPOSITE, BEING SO FIGETY OR RESTLESS THAT YOU HAVE BEEN MOVING AROUND A LOT MORE THAN USUAL: 0
7. TROUBLE CONCENTRATING ON THINGS, SUCH AS READING THE NEWSPAPER OR WATCHING TELEVISION: 0
9. THOUGHTS THAT YOU WOULD BE BETTER OFF DEAD, OR OF HURTING YOURSELF: 0
SUM OF ALL RESPONSES TO PHQ QUESTIONS 1-9: 2
6. FEELING BAD ABOUT YOURSELF - OR THAT YOU ARE A FAILURE OR HAVE LET YOURSELF OR YOUR FAMILY DOWN: 0
SUM OF ALL RESPONSES TO PHQ QUESTIONS 1-9: 2
4. FEELING TIRED OR HAVING LITTLE ENERGY: 1
10. IF YOU CHECKED OFF ANY PROBLEMS, HOW DIFFICULT HAVE THESE PROBLEMS MADE IT FOR YOU TO DO YOUR WORK, TAKE CARE OF THINGS AT HOME, OR GET ALONG WITH OTHER PEOPLE: 0

## 2023-02-10 NOTE — PROGRESS NOTES
Fernanda Woodardo  1935        Chief Complaint   Patient presents with    Medicare AWV    Other     Weight check          Assessment/Plan:     1. Tremor  Maintain medication, Stable    - propranolol (INDERAL) 10 MG tablet; Take 1 tablet by mouth 2 times daily  Dispense: 180 tablet; Refill: 1    2. Mild episode of recurrent major depressive disorder (HCC)  Restart Celexa. Doing well and continue frequent visits from children. Maintain independence as long as possible    - citalopram (CELEXA) 10 MG tablet; TAKE 2 TABLETS BY MOUTH DAILY FOR ANXIETY at bedtime  Dispense: 180 tablet; Refill: 1    3. Macular degeneration, unspecified laterality, unspecified type  Monitor. Worsening. Return for 3 month f/u weight check AWV. Total time w pt and encounter: 35 minutes    HPI:      Takes her tremor medication pill twice a day. Out of Celexa medication - for unknown amount of time. Pt states her vision is worsening. She does not see provider any longer at [facility] as benefit would be minimal regarding further treatment. She lives alone with her dog. She gets meals on wheels. She has good neighbors. Lives in a ranch, one story living. , previous pt at [facility], passed 12/2021. Pt states tremor present. Mild. Propranolol BID. Feels she is doing ok getting around her home. Eating better since her  passed but sometimes meals are \"off\". Family visits often and she has good neighbors. She does not eat meals with others but she does have visitors often. /60 (Site: Right Upper Arm, Position: Sitting, Cuff Size: Medium Adult)   Pulse 81   Temp 97.3 °F (36.3 °C) (Temporal)   Wt 127 lb 9.6 oz (57.9 kg)   SpO2 96%   BMI 21.90 kg/m²     Prior to Visit Medications    Medication Sig Taking?  Authorizing Provider   citalopram (CELEXA) 10 MG tablet TAKE 2 TABLETS BY MOUTH DAILY FOR ANXIETY at bedtime Yes DEBBIE Camilo - CNP   propranolol (INDERAL) 10 MG tablet Take 1 tablet by mouth 2 times daily Yes Kalyn Willoughby APRN - CNP   naproxen (NAPROSYN) 500 MG tablet TAKE 1 TABLET BY MOUTH TWICE A DAY WITH MEALS Yes Latasha Han MD   aspirin 81 MG tablet Take 81 mg by mouth daily Yes Historical Provider, MD   Multiple Vitamins-Minerals (ICAPS) TABS Take  by mouth. Yes Historical Provider, MD   calcium carbonate (OSCAL) 500 MG TABS tablet Take 1,200 mg by mouth daily. Yes Historical Provider, MD     Family History   Problem Relation Age of Onset    Heart Defect Mother     Prostate Cancer Father      Social History     Socioeconomic History    Marital status:      Spouse name: Not on file    Number of children: Not on file    Years of education: Not on file    Highest education level: Not on file   Occupational History    Not on file   Tobacco Use    Smoking status: Never    Smokeless tobacco: Never   Substance and Sexual Activity    Alcohol use: Not Currently     Comment: occasional    Drug use: No    Sexual activity: Not on file   Other Topics Concern    Not on file   Social History Narrative    Not on file     Social Determinants of Health     Financial Resource Strain: Low Risk     Difficulty of Paying Living Expenses: Not very hard   Food Insecurity: No Food Insecurity    Worried About Running Out of Food in the Last Year: Never true    Ran Out of Food in the Last Year: Never true   Transportation Needs: Unknown    Lack of Transportation (Medical): Not on file    Lack of Transportation (Non-Medical): No   Physical Activity: Not on file   Stress: Not on file   Social Connections: Not on file   Intimate Partner Violence: Not on file   Housing Stability: Unknown    Unable to Pay for Housing in the Last Year: Not on file    Number of Places Lived in the Last Year: Not on file    Unstable Housing in the Last Year: No       Review of Systems   Constitutional:  Negative for appetite change, chills, fatigue, fever and unexpected weight change.    HENT:  Negative for congestion, ear discharge, ear pain, facial swelling, hearing loss, sinus pressure, sneezing and sore throat. Eyes:  Positive for visual disturbance (chronic). Respiratory:  Negative for cough. Cardiovascular:  Negative for chest pain. Gastrointestinal:  Negative for diarrhea, nausea and vomiting. Genitourinary:  Negative for difficulty urinating, dysuria, hematuria and urgency. Musculoskeletal:  Negative for arthralgias and gait problem. Neurological:  Positive for tremors (chronic). Negative for dizziness, weakness and headaches. Hematological:  Negative for adenopathy. Psychiatric/Behavioral:  Positive for dysphoric mood (stable). Negative for sleep disturbance and suicidal ideas. Physical Exam  Vitals reviewed. Constitutional:       Appearance: She is normal weight. HENT:      Head: Normocephalic. Neck:      Vascular: No carotid bruit. Cardiovascular:      Rate and Rhythm: Normal rate and regular rhythm. Pulses: Normal pulses. Heart sounds: Normal heart sounds. Pulmonary:      Effort: Pulmonary effort is normal.      Breath sounds: Normal breath sounds. Musculoskeletal:      Right lower leg: No edema. Left lower leg: No edema. Neurological:      General: No focal deficit present. Mental Status: She is alert and oriented to person, place, and time. Psychiatric:         Mood and Affect: Mood normal.         Thought Content: Thought content normal.         Judgment: Judgment normal.           See above plan.          Gaurav Ballard, APRN - CNP

## 2023-02-14 ASSESSMENT — ENCOUNTER SYMPTOMS
COUGH: 0
NAUSEA: 0
FACIAL SWELLING: 0
SINUS PRESSURE: 0
SORE THROAT: 0
VOMITING: 0
DIARRHEA: 0

## 2023-03-08 NOTE — PROGRESS NOTES
10/22/20    [facility]  1935      Chief Complaint   Patient presents with    Tremors       HPI    Here for f/u tremors. Sxs improved with propranolol. States they are worse in the morning but improves after taking the medication. Sxs begin to return in the evening. Taking propranolol 10mg once daily. Review of Systems   Constitutional: Negative for unexpected weight change. Respiratory: Negative for cough, chest tightness, shortness of breath and wheezing. Cardiovascular: Negative for chest pain, palpitations and leg swelling. Gastrointestinal: Negative for abdominal distention, constipation, diarrhea, nausea and vomiting. Musculoskeletal: Negative for arthralgias, back pain and myalgias. Neurological: Positive for tremors. Negative for numbness. All other systems reviewed and are negative. Current Outpatient Medications   Medication Sig Dispense Refill    propranolol (INDERAL) 10 MG tablet Take 1 tablet by mouth 2 times daily 90 tablet 3    naproxen (NAPROSYN) 500 MG tablet Take 1 tablet by mouth 2 times daily (with meals) 60 tablet 3    alendronate (FOSAMAX) 70 MG tablet TAKE 1 TABLET EVERY 7 DAYS 12 tablet 3    atorvastatin (LIPITOR) 10 MG tablet TAKE 1 TABLET DAILY (DUE   FOR APPOINTMENT) 90 tablet 3    hydrochlorothiazide (HYDRODIURIL) 25 MG tablet Take 0.5 tablets by mouth daily 15 tablet 3    aspirin 81 MG tablet Take 81 mg by mouth daily      Multiple Vitamins-Minerals (ICAPS) TABS Take  by mouth.  calcium carbonate (OSCAL) 500 MG TABS tablet Take 1,200 mg by mouth daily. No current facility-administered medications for this visit. Physical Exam  Vitals signs reviewed. Constitutional:       General: She is not in acute distress. Appearance: She is well-developed. She is not diaphoretic. Neck:      Musculoskeletal: Neck supple. Thyroid: No thyromegaly. Cardiovascular:      Rate and Rhythm: Normal rate and regular rhythm.       Heart sounds: Normal heart sounds. No murmur. No friction rub. No gallop. Pulmonary:      Effort: Pulmonary effort is normal. No respiratory distress. Breath sounds: Normal breath sounds. No wheezing or rales. Abdominal:      Palpations: Abdomen is soft. Neurological:      General: No focal deficit present. Mental Status: She is alert and oriented to person, place, and time. Vitals:    10/22/20 0926   BP: 120/78   Pulse: 75   Temp: 98.4 °F (36.9 °C)   SpO2: 96%         ASSESSMENT/PLAN:  1. Tremor  Improved. Sxs return in the evenings. Will increase propranolol to twice daily  - propranolol (INDERAL) 10 MG tablet; Take 1 tablet by mouth 2 times daily  Dispense: 90 tablet;  Refill: 3

## 2023-05-12 ENCOUNTER — TELEPHONE (OUTPATIENT)
Dept: INTERNAL MEDICINE CLINIC | Age: 88
End: 2023-05-12

## 2023-05-13 ENCOUNTER — HOSPITAL ENCOUNTER (INPATIENT)
Age: 88
LOS: 4 days | Discharge: NURSING, REHAB OR LONG-TERM CARE | DRG: 480 | End: 2023-05-17
Attending: EMERGENCY MEDICINE | Admitting: INTERNAL MEDICINE
Payer: MEDICARE

## 2023-05-13 ENCOUNTER — APPOINTMENT (OUTPATIENT)
Dept: GENERAL RADIOLOGY | Age: 88
DRG: 480 | End: 2023-05-13
Payer: MEDICARE

## 2023-05-13 DIAGNOSIS — S72.145A CLOSED NONDISPLACED INTERTROCHANTERIC FRACTURE OF LEFT FEMUR, INITIAL ENCOUNTER (HCC): Primary | ICD-10-CM
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
## 2023-05-13 PROBLEM — S72.142A INTERTROCHANTERIC FRACTURE OF LEFT HIP, CLOSED, INITIAL ENCOUNTER (HCC): Status: ACTIVE | Noted: 2023-05-13

## 2023-05-13 PROBLEM — M25.552 LEFT HIP PAIN: Status: ACTIVE | Noted: 2023-05-13

## 2023-05-13 LAB
ABO + RH BLD: NORMAL
ANION GAP SERPL CALCULATED.3IONS-SCNC: 14 MMOL/L (ref 3–16)
BASOPHILS # BLD: 0.1 K/UL (ref 0–0.2)
BASOPHILS NFR BLD: 0.8 %
BLD GP AB SCN SERPL QL: NORMAL
BUN SERPL-MCNC: 13 MG/DL (ref 7–20)
CALCIUM SERPL-MCNC: 9.5 MG/DL (ref 8.3–10.6)
CHLORIDE SERPL-SCNC: 99 MMOL/L (ref 99–110)
CO2 SERPL-SCNC: 24 MMOL/L (ref 21–32)
CREAT SERPL-MCNC: 0.6 MG/DL (ref 0.6–1.2)
DEPRECATED RDW RBC AUTO: 14.3 % (ref 12.4–15.4)
EOSINOPHIL # BLD: 0.2 K/UL (ref 0–0.6)
EOSINOPHIL NFR BLD: 2.8 %
GFR SERPLBLD CREATININE-BSD FMLA CKD-EPI: >60 ML/MIN/{1.73_M2}
GLUCOSE SERPL-MCNC: 129 MG/DL (ref 70–99)
HCT VFR BLD AUTO: 42.7 % (ref 36–48)
HGB BLD-MCNC: 14.1 G/DL (ref 12–16)
LYMPHOCYTES # BLD: 2.3 K/UL (ref 1–5.1)
LYMPHOCYTES NFR BLD: 34.8 %
MCH RBC QN AUTO: 31.3 PG (ref 26–34)
MCHC RBC AUTO-ENTMCNC: 33.1 G/DL (ref 31–36)
MCV RBC AUTO: 94.6 FL (ref 80–100)
MONOCYTES # BLD: 0.8 K/UL (ref 0–1.3)
MONOCYTES NFR BLD: 11.4 %
NEUTROPHILS # BLD: 3.4 K/UL (ref 1.7–7.7)
NEUTROPHILS NFR BLD: 50.2 %
PLATELET # BLD AUTO: 169 K/UL (ref 135–450)
PMV BLD AUTO: 8.1 FL (ref 5–10.5)
POTASSIUM SERPL-SCNC: 4.3 MMOL/L (ref 3.5–5.1)
RBC # BLD AUTO: 4.52 M/UL (ref 4–5.2)
SODIUM SERPL-SCNC: 137 MMOL/L (ref 136–145)
WBC # BLD AUTO: 6.7 K/UL (ref 4–11)

## 2023-05-13 PROCEDURE — 99285 EMERGENCY DEPT VISIT HI MDM: CPT

## 2023-05-13 PROCEDURE — 71045 X-RAY EXAM CHEST 1 VIEW: CPT

## 2023-05-13 PROCEDURE — 86901 BLOOD TYPING SEROLOGIC RH(D): CPT

## 2023-05-13 PROCEDURE — 1200000000 HC SEMI PRIVATE

## 2023-05-13 PROCEDURE — 96374 THER/PROPH/DIAG INJ IV PUSH: CPT

## 2023-05-13 PROCEDURE — 6360000002 HC RX W HCPCS: Performed by: PHYSICIAN ASSISTANT

## 2023-05-13 PROCEDURE — 73502 X-RAY EXAM HIP UNI 2-3 VIEWS: CPT

## 2023-05-13 PROCEDURE — 2580000003 HC RX 258: Performed by: INTERNAL MEDICINE

## 2023-05-13 PROCEDURE — 96375 TX/PRO/DX INJ NEW DRUG ADDON: CPT

## 2023-05-13 PROCEDURE — 6360000002 HC RX W HCPCS: Performed by: INTERNAL MEDICINE

## 2023-05-13 PROCEDURE — 82306 VITAMIN D 25 HYDROXY: CPT

## 2023-05-13 PROCEDURE — 6370000000 HC RX 637 (ALT 250 FOR IP): Performed by: NURSE PRACTITIONER

## 2023-05-13 PROCEDURE — 6360000002 HC RX W HCPCS: Performed by: EMERGENCY MEDICINE

## 2023-05-13 PROCEDURE — 73560 X-RAY EXAM OF KNEE 1 OR 2: CPT

## 2023-05-13 PROCEDURE — 86850 RBC ANTIBODY SCREEN: CPT

## 2023-05-13 PROCEDURE — 86900 BLOOD TYPING SEROLOGIC ABO: CPT

## 2023-05-13 PROCEDURE — 6370000000 HC RX 637 (ALT 250 FOR IP): Performed by: INTERNAL MEDICINE

## 2023-05-13 PROCEDURE — 6360000002 HC RX W HCPCS

## 2023-05-13 PROCEDURE — 85025 COMPLETE CBC W/AUTO DIFF WBC: CPT

## 2023-05-13 PROCEDURE — 80048 BASIC METABOLIC PNL TOTAL CA: CPT

## 2023-05-13 PROCEDURE — 36415 COLL VENOUS BLD VENIPUNCTURE: CPT

## 2023-05-13 RX ORDER — ACETAMINOPHEN 325 MG/1
650 TABLET ORAL EVERY 4 HOURS PRN
Status: DISCONTINUED | OUTPATIENT
Start: 2023-05-13 | End: 2023-05-17 | Stop reason: HOSPADM

## 2023-05-13 RX ORDER — SODIUM CHLORIDE 0.9 % (FLUSH) 0.9 %
10 SYRINGE (ML) INJECTION EVERY 12 HOURS SCHEDULED
Status: DISCONTINUED | OUTPATIENT
Start: 2023-05-13 | End: 2023-05-17 | Stop reason: HOSPADM

## 2023-05-13 RX ORDER — POLYETHYLENE GLYCOL 3350 17 G/17G
17 POWDER, FOR SOLUTION ORAL DAILY PRN
Status: DISCONTINUED | OUTPATIENT
Start: 2023-05-13 | End: 2023-05-16

## 2023-05-13 RX ORDER — MORPHINE SULFATE 4 MG/ML
4 INJECTION, SOLUTION INTRAMUSCULAR; INTRAVENOUS ONCE
Status: COMPLETED | OUTPATIENT
Start: 2023-05-13 | End: 2023-05-13

## 2023-05-13 RX ORDER — SODIUM CHLORIDE 9 MG/ML
INJECTION, SOLUTION INTRAVENOUS PRN
Status: DISCONTINUED | OUTPATIENT
Start: 2023-05-13 | End: 2023-05-17 | Stop reason: HOSPADM

## 2023-05-13 RX ORDER — MORPHINE SULFATE 4 MG/ML
4 INJECTION, SOLUTION INTRAMUSCULAR; INTRAVENOUS
Status: DISCONTINUED | OUTPATIENT
Start: 2023-05-13 | End: 2023-05-16

## 2023-05-13 RX ORDER — MORPHINE SULFATE 2 MG/ML
INJECTION, SOLUTION INTRAMUSCULAR; INTRAVENOUS
Status: COMPLETED
Start: 2023-05-13 | End: 2023-05-13

## 2023-05-13 RX ORDER — ACETAMINOPHEN 650 MG/1
650 SUPPOSITORY RECTAL EVERY 4 HOURS PRN
Status: DISCONTINUED | OUTPATIENT
Start: 2023-05-13 | End: 2023-05-17 | Stop reason: HOSPADM

## 2023-05-13 RX ORDER — ONDANSETRON 2 MG/ML
4 INJECTION INTRAMUSCULAR; INTRAVENOUS ONCE
Status: COMPLETED | OUTPATIENT
Start: 2023-05-13 | End: 2023-05-13

## 2023-05-13 RX ORDER — ONDANSETRON 2 MG/ML
4 INJECTION INTRAMUSCULAR; INTRAVENOUS EVERY 4 HOURS PRN
Status: DISCONTINUED | OUTPATIENT
Start: 2023-05-13 | End: 2023-05-17 | Stop reason: HOSPADM

## 2023-05-13 RX ORDER — CALCIUM CARBONATE 500(1250)
1000 TABLET ORAL DAILY
Status: DISCONTINUED | OUTPATIENT
Start: 2023-05-14 | End: 2023-05-17 | Stop reason: HOSPADM

## 2023-05-13 RX ORDER — SODIUM CHLORIDE 0.9 % (FLUSH) 0.9 %
10 SYRINGE (ML) INJECTION PRN
Status: DISCONTINUED | OUTPATIENT
Start: 2023-05-13 | End: 2023-05-17 | Stop reason: HOSPADM

## 2023-05-13 RX ORDER — PROPRANOLOL HYDROCHLORIDE 10 MG/1
10 TABLET ORAL 2 TIMES DAILY
Status: DISCONTINUED | OUTPATIENT
Start: 2023-05-13 | End: 2023-05-15

## 2023-05-13 RX ORDER — CITALOPRAM 20 MG/1
20 TABLET ORAL
Status: DISCONTINUED | OUTPATIENT
Start: 2023-05-13 | End: 2023-05-17 | Stop reason: HOSPADM

## 2023-05-13 RX ORDER — SODIUM CHLORIDE 9 MG/ML
INJECTION, SOLUTION INTRAVENOUS CONTINUOUS
Status: DISCONTINUED | OUTPATIENT
Start: 2023-05-13 | End: 2023-05-15

## 2023-05-13 RX ORDER — ASPIRIN 81 MG/1
81 TABLET ORAL DAILY
Status: DISCONTINUED | OUTPATIENT
Start: 2023-05-14 | End: 2023-05-17 | Stop reason: HOSPADM

## 2023-05-13 RX ORDER — DIPHENHYDRAMINE HCL 25 MG
25 TABLET ORAL EVERY 6 HOURS PRN
Status: DISCONTINUED | OUTPATIENT
Start: 2023-05-13 | End: 2023-05-17 | Stop reason: HOSPADM

## 2023-05-13 RX ORDER — OXYCODONE HYDROCHLORIDE 5 MG/1
5 TABLET ORAL EVERY 4 HOURS PRN
Status: DISCONTINUED | OUTPATIENT
Start: 2023-05-13 | End: 2023-05-16

## 2023-05-13 RX ORDER — MORPHINE SULFATE 2 MG/ML
2 INJECTION, SOLUTION INTRAMUSCULAR; INTRAVENOUS
Status: DISCONTINUED | OUTPATIENT
Start: 2023-05-13 | End: 2023-05-16

## 2023-05-13 RX ADMIN — MORPHINE SULFATE 4 MG: 4 INJECTION, SOLUTION INTRAMUSCULAR; INTRAVENOUS at 23:08

## 2023-05-13 RX ADMIN — MORPHINE SULFATE 2 MG: 2 INJECTION, SOLUTION INTRAMUSCULAR; INTRAVENOUS at 20:30

## 2023-05-13 RX ADMIN — MORPHINE SULFATE 4 MG: 4 INJECTION, SOLUTION INTRAMUSCULAR; INTRAVENOUS at 19:15

## 2023-05-13 RX ADMIN — SODIUM CHLORIDE: 9 INJECTION, SOLUTION INTRAVENOUS at 21:44

## 2023-05-13 RX ADMIN — CITALOPRAM HYDROBROMIDE 20 MG: 20 TABLET ORAL at 21:40

## 2023-05-13 RX ADMIN — ONDANSETRON 4 MG: 2 INJECTION INTRAMUSCULAR; INTRAVENOUS at 19:13

## 2023-05-13 RX ADMIN — DIPHENHYDRAMINE HYDROCHLORIDE 25 MG: 25 TABLET ORAL at 23:08

## 2023-05-13 RX ADMIN — PROPRANOLOL HYDROCHLORIDE 10 MG: 10 TABLET ORAL at 21:40

## 2023-05-13 RX ADMIN — Medication 10 ML: at 21:46

## 2023-05-13 RX ADMIN — OXYCODONE 5 MG: 5 TABLET ORAL at 21:40

## 2023-05-13 RX ADMIN — MORPHINE SULFATE 4 MG: 4 INJECTION, SOLUTION INTRAMUSCULAR; INTRAVENOUS at 19:42

## 2023-05-13 ASSESSMENT — PAIN SCALES - GENERAL
PAINLEVEL_OUTOF10: 9
PAINLEVEL_OUTOF10: 9
PAINLEVEL_OUTOF10: 8
PAINLEVEL_OUTOF10: 8
PAINLEVEL_OUTOF10: 10
PAINLEVEL_OUTOF10: 10
PAINLEVEL_OUTOF10: 7
PAINLEVEL_OUTOF10: 10

## 2023-05-13 ASSESSMENT — PAIN DESCRIPTION - ORIENTATION
ORIENTATION: LEFT

## 2023-05-13 ASSESSMENT — PAIN DESCRIPTION - LOCATION
LOCATION: LEG
LOCATION: LEG;HIP
LOCATION: HIP;LEG
LOCATION: LEG;HIP
LOCATION: LEG;HIP

## 2023-05-13 ASSESSMENT — ENCOUNTER SYMPTOMS
ABDOMINAL PAIN: 0
RHINORRHEA: 0
VOMITING: 0
NAUSEA: 0
WHEEZING: 0
SHORTNESS OF BREATH: 0
COUGH: 0
DIARRHEA: 0

## 2023-05-13 ASSESSMENT — PAIN - FUNCTIONAL ASSESSMENT: PAIN_FUNCTIONAL_ASSESSMENT: 0-10

## 2023-05-13 ASSESSMENT — PAIN DESCRIPTION - PAIN TYPE: TYPE: ACUTE PAIN

## 2023-05-13 ASSESSMENT — PAIN DESCRIPTION - FREQUENCY: FREQUENCY: CONTINUOUS

## 2023-05-14 ENCOUNTER — ANESTHESIA EVENT (OUTPATIENT)
Dept: OPERATING ROOM | Age: 88
End: 2023-05-14
Payer: MEDICARE

## 2023-05-14 ENCOUNTER — APPOINTMENT (OUTPATIENT)
Dept: GENERAL RADIOLOGY | Age: 88
DRG: 480 | End: 2023-05-14
Payer: MEDICARE

## 2023-05-14 ENCOUNTER — ANESTHESIA (OUTPATIENT)
Dept: OPERATING ROOM | Age: 88
End: 2023-05-14
Payer: MEDICARE

## 2023-05-14 LAB
25(OH)D3 SERPL-MCNC: 23.6 NG/ML
ANION GAP SERPL CALCULATED.3IONS-SCNC: 12 MMOL/L (ref 3–16)
BASE EXCESS BLDA CALC-SCNC: -2.3 MMOL/L (ref -3–3)
BASE EXCESS BLDA CALC-SCNC: -3.4 MMOL/L (ref -3–3)
BASOPHILS # BLD: 0 K/UL (ref 0–0.2)
BASOPHILS NFR BLD: 0.3 %
BUN SERPL-MCNC: 14 MG/DL (ref 7–20)
CALCIUM SERPL-MCNC: 8.5 MG/DL (ref 8.3–10.6)
CHLORIDE SERPL-SCNC: 101 MMOL/L (ref 99–110)
CO2 BLDA-SCNC: 25 MMOL/L
CO2 BLDA-SCNC: 25.8 MMOL/L
CO2 SERPL-SCNC: 24 MMOL/L (ref 21–32)
COHGB MFR BLDA: 0.3 % (ref 0–1.5)
COHGB MFR BLDA: 0.3 % (ref 0–1.5)
CREAT SERPL-MCNC: 0.7 MG/DL (ref 0.6–1.2)
DEPRECATED RDW RBC AUTO: 15 % (ref 12.4–15.4)
EKG ATRIAL RATE: 81 BPM
EKG DIAGNOSIS: NORMAL
EKG P AXIS: 83 DEGREES
EKG P-R INTERVAL: 156 MS
EKG Q-T INTERVAL: 410 MS
EKG QRS DURATION: 90 MS
EKG QTC CALCULATION (BAZETT): 476 MS
EKG R AXIS: 36 DEGREES
EKG T AXIS: 60 DEGREES
EKG VENTRICULAR RATE: 81 BPM
EOSINOPHIL # BLD: 0 K/UL (ref 0–0.6)
EOSINOPHIL NFR BLD: 0.1 %
GFR SERPLBLD CREATININE-BSD FMLA CKD-EPI: >60 ML/MIN/{1.73_M2}
GLUCOSE SERPL-MCNC: 197 MG/DL (ref 70–99)
HCO3 BLDA-SCNC: 23.6 MMOL/L (ref 21–29)
HCO3 BLDA-SCNC: 24.1 MMOL/L (ref 21–29)
HCT VFR BLD AUTO: 41.3 % (ref 36–48)
HGB BLD-MCNC: 13.2 G/DL (ref 12–16)
HGB BLDA-MCNC: 11 G/DL (ref 12–16)
HGB BLDA-MCNC: 11 G/DL (ref 12–16)
LYMPHOCYTES # BLD: 0.7 K/UL (ref 1–5.1)
LYMPHOCYTES NFR BLD: 9.2 %
MCH RBC QN AUTO: 31.4 PG (ref 26–34)
MCHC RBC AUTO-ENTMCNC: 31.9 G/DL (ref 31–36)
MCV RBC AUTO: 98.4 FL (ref 80–100)
METHGB MFR BLDA: 0.7 %
METHGB MFR BLDA: 0.7 %
MONOCYTES # BLD: 0.8 K/UL (ref 0–1.3)
MONOCYTES NFR BLD: 10.5 %
NEUTROPHILS # BLD: 6.3 K/UL (ref 1.7–7.7)
NEUTROPHILS NFR BLD: 79.9 %
O2 THERAPY: ABNORMAL
O2 THERAPY: ABNORMAL
PCO2 BLDA: 45.2 MMHG (ref 35–45)
PCO2 BLDA: 55 MMHG (ref 35–45)
PH BLDA: 7.26 [PH] (ref 7.35–7.45)
PH BLDA: 7.33 [PH] (ref 7.35–7.45)
PLATELET # BLD AUTO: 157 K/UL (ref 135–450)
PMV BLD AUTO: 8.4 FL (ref 5–10.5)
PO2 BLDA: 120.9 MMHG (ref 75–108)
PO2 BLDA: 128.4 MMHG (ref 75–108)
POTASSIUM SERPL-SCNC: 4.3 MMOL/L (ref 3.5–5.1)
RBC # BLD AUTO: 4.2 M/UL (ref 4–5.2)
SAO2 % BLDA: 97.9 %
SAO2 % BLDA: 98.1 %
SODIUM SERPL-SCNC: 137 MMOL/L (ref 136–145)
WBC # BLD AUTO: 7.9 K/UL (ref 4–11)

## 2023-05-14 PROCEDURE — 2580000003 HC RX 258: Performed by: STUDENT IN AN ORGANIZED HEALTH CARE EDUCATION/TRAINING PROGRAM

## 2023-05-14 PROCEDURE — 2060000000 HC ICU INTERMEDIATE R&B

## 2023-05-14 PROCEDURE — 3209999900 FLUORO FOR SURGICAL PROCEDURES

## 2023-05-14 PROCEDURE — 6360000002 HC RX W HCPCS: Performed by: SPECIALIST/TECHNOLOGIST

## 2023-05-14 PROCEDURE — 6360000002 HC RX W HCPCS

## 2023-05-14 PROCEDURE — 3700000000 HC ANESTHESIA ATTENDED CARE: Performed by: STUDENT IN AN ORGANIZED HEALTH CARE EDUCATION/TRAINING PROGRAM

## 2023-05-14 PROCEDURE — 2780000010 HC IMPLANT OTHER: Performed by: STUDENT IN AN ORGANIZED HEALTH CARE EDUCATION/TRAINING PROGRAM

## 2023-05-14 PROCEDURE — 7100000000 HC PACU RECOVERY - FIRST 15 MIN: Performed by: STUDENT IN AN ORGANIZED HEALTH CARE EDUCATION/TRAINING PROGRAM

## 2023-05-14 PROCEDURE — 6370000000 HC RX 637 (ALT 250 FOR IP): Performed by: INTERNAL MEDICINE

## 2023-05-14 PROCEDURE — 1200000000 HC SEMI PRIVATE

## 2023-05-14 PROCEDURE — 0QS706Z REPOSITION LEFT UPPER FEMUR WITH INTRAMEDULLARY INTERNAL FIXATION DEVICE, OPEN APPROACH: ICD-10-PCS | Performed by: STUDENT IN AN ORGANIZED HEALTH CARE EDUCATION/TRAINING PROGRAM

## 2023-05-14 PROCEDURE — 2580000003 HC RX 258: Performed by: NURSE PRACTITIONER

## 2023-05-14 PROCEDURE — 2720000010 HC SURG SUPPLY STERILE: Performed by: STUDENT IN AN ORGANIZED HEALTH CARE EDUCATION/TRAINING PROGRAM

## 2023-05-14 PROCEDURE — 6360000002 HC RX W HCPCS: Performed by: ANESTHESIOLOGY

## 2023-05-14 PROCEDURE — C1769 GUIDE WIRE: HCPCS | Performed by: STUDENT IN AN ORGANIZED HEALTH CARE EDUCATION/TRAINING PROGRAM

## 2023-05-14 PROCEDURE — 94761 N-INVAS EAR/PLS OXIMETRY MLT: CPT

## 2023-05-14 PROCEDURE — 2709999900 HC NON-CHARGEABLE SUPPLY: Performed by: STUDENT IN AN ORGANIZED HEALTH CARE EDUCATION/TRAINING PROGRAM

## 2023-05-14 PROCEDURE — 93005 ELECTROCARDIOGRAM TRACING: CPT | Performed by: NURSE PRACTITIONER

## 2023-05-14 PROCEDURE — 3700000001 HC ADD 15 MINUTES (ANESTHESIA): Performed by: STUDENT IN AN ORGANIZED HEALTH CARE EDUCATION/TRAINING PROGRAM

## 2023-05-14 PROCEDURE — 82803 BLOOD GASES ANY COMBINATION: CPT

## 2023-05-14 PROCEDURE — 3600000005 HC SURGERY LEVEL 5 BASE: Performed by: STUDENT IN AN ORGANIZED HEALTH CARE EDUCATION/TRAINING PROGRAM

## 2023-05-14 PROCEDURE — 3600000015 HC SURGERY LEVEL 5 ADDTL 15MIN: Performed by: STUDENT IN AN ORGANIZED HEALTH CARE EDUCATION/TRAINING PROGRAM

## 2023-05-14 PROCEDURE — 73552 X-RAY EXAM OF FEMUR 2/>: CPT

## 2023-05-14 PROCEDURE — 36600 WITHDRAWAL OF ARTERIAL BLOOD: CPT

## 2023-05-14 PROCEDURE — 7100000001 HC PACU RECOVERY - ADDTL 15 MIN: Performed by: STUDENT IN AN ORGANIZED HEALTH CARE EDUCATION/TRAINING PROGRAM

## 2023-05-14 PROCEDURE — 6360000002 HC RX W HCPCS: Performed by: STUDENT IN AN ORGANIZED HEALTH CARE EDUCATION/TRAINING PROGRAM

## 2023-05-14 PROCEDURE — 2700000000 HC OXYGEN THERAPY PER DAY

## 2023-05-14 PROCEDURE — 80048 BASIC METABOLIC PNL TOTAL CA: CPT

## 2023-05-14 PROCEDURE — 2500000003 HC RX 250 WO HCPCS: Performed by: ANESTHESIOLOGY

## 2023-05-14 PROCEDURE — 6360000002 HC RX W HCPCS: Performed by: INTERNAL MEDICINE

## 2023-05-14 PROCEDURE — 6360000002 HC RX W HCPCS: Performed by: NURSE ANESTHETIST, CERTIFIED REGISTERED

## 2023-05-14 PROCEDURE — C1713 ANCHOR/SCREW BN/BN,TIS/BN: HCPCS | Performed by: STUDENT IN AN ORGANIZED HEALTH CARE EDUCATION/TRAINING PROGRAM

## 2023-05-14 PROCEDURE — 6370000000 HC RX 637 (ALT 250 FOR IP): Performed by: STUDENT IN AN ORGANIZED HEALTH CARE EDUCATION/TRAINING PROGRAM

## 2023-05-14 PROCEDURE — C1776 JOINT DEVICE (IMPLANTABLE): HCPCS | Performed by: STUDENT IN AN ORGANIZED HEALTH CARE EDUCATION/TRAINING PROGRAM

## 2023-05-14 PROCEDURE — 85025 COMPLETE CBC W/AUTO DIFF WBC: CPT

## 2023-05-14 PROCEDURE — 2580000003 HC RX 258: Performed by: INTERNAL MEDICINE

## 2023-05-14 PROCEDURE — 94660 CPAP INITIATION&MGMT: CPT

## 2023-05-14 PROCEDURE — 2500000003 HC RX 250 WO HCPCS: Performed by: STUDENT IN AN ORGANIZED HEALTH CARE EDUCATION/TRAINING PROGRAM

## 2023-05-14 PROCEDURE — 36415 COLL VENOUS BLD VENIPUNCTURE: CPT

## 2023-05-14 DEVICE — SCREW BNE L56MM DIA5MM ST TIB LT GRN TI ST CANN LOK FULL: Type: IMPLANTABLE DEVICE | Site: HIP | Status: FUNCTIONAL

## 2023-05-14 DEVICE — CABLE SURG DIA1.7MM S STL HA CERCLAGE W/ CRMP 29880101S] DEPUY SYNTHES USA]: Type: IMPLANTABLE DEVICE | Site: HIP | Status: FUNCTIONAL

## 2023-05-14 DEVICE — NAIL IM L380MM DIA11MM 130DEG LNG L PROX FEM GRN TI CANN: Type: IMPLANTABLE DEVICE | Site: HIP | Status: FUNCTIONAL

## 2023-05-14 DEVICE — IMPLANTABLE DEVICE: Type: IMPLANTABLE DEVICE | Site: HIP | Status: FUNCTIONAL

## 2023-05-14 RX ORDER — OXYCODONE HYDROCHLORIDE 5 MG/1
5 TABLET ORAL PRN
Status: DISCONTINUED | OUTPATIENT
Start: 2023-05-14 | End: 2023-05-14 | Stop reason: HOSPADM

## 2023-05-14 RX ORDER — CYCLOBENZAPRINE HCL 10 MG
10 TABLET ORAL 3 TIMES DAILY PRN
Status: DISCONTINUED | OUTPATIENT
Start: 2023-05-14 | End: 2023-05-17

## 2023-05-14 RX ORDER — MEPERIDINE HYDROCHLORIDE 50 MG/ML
12.5 INJECTION INTRAMUSCULAR; INTRAVENOUS; SUBCUTANEOUS EVERY 5 MIN PRN
Status: DISCONTINUED | OUTPATIENT
Start: 2023-05-14 | End: 2023-05-14 | Stop reason: HOSPADM

## 2023-05-14 RX ORDER — CEFAZOLIN SODIUM IN 0.9 % NACL 2 G/100 ML
2000 PLASTIC BAG, INJECTION (ML) INTRAVENOUS
Status: COMPLETED | OUTPATIENT
Start: 2023-05-14 | End: 2023-05-14

## 2023-05-14 RX ORDER — SODIUM CHLORIDE 0.9 % (FLUSH) 0.9 %
5-40 SYRINGE (ML) INJECTION PRN
Status: DISCONTINUED | OUTPATIENT
Start: 2023-05-14 | End: 2023-05-14 | Stop reason: HOSPADM

## 2023-05-14 RX ORDER — MIDAZOLAM HYDROCHLORIDE 1 MG/ML
2 INJECTION INTRAMUSCULAR; INTRAVENOUS
Status: DISCONTINUED | OUTPATIENT
Start: 2023-05-14 | End: 2023-05-14 | Stop reason: HOSPADM

## 2023-05-14 RX ORDER — CEFAZOLIN SODIUM IN 0.9 % NACL 2 G/100 ML
2000 PLASTIC BAG, INJECTION (ML) INTRAVENOUS EVERY 8 HOURS
Status: COMPLETED | OUTPATIENT
Start: 2023-05-14 | End: 2023-05-15

## 2023-05-14 RX ORDER — ROCURONIUM BROMIDE 10 MG/ML
INJECTION, SOLUTION INTRAVENOUS PRN
Status: DISCONTINUED | OUTPATIENT
Start: 2023-05-14 | End: 2023-05-14 | Stop reason: SDUPTHER

## 2023-05-14 RX ORDER — PHENYLEPHRINE HCL IN 0.9% NACL 1 MG/10 ML
SYRINGE (ML) INTRAVENOUS PRN
Status: DISCONTINUED | OUTPATIENT
Start: 2023-05-14 | End: 2023-05-14 | Stop reason: SDUPTHER

## 2023-05-14 RX ORDER — BUPIVACAINE HYDROCHLORIDE 5 MG/ML
INJECTION, SOLUTION EPIDURAL; INTRACAUDAL PRN
Status: DISCONTINUED | OUTPATIENT
Start: 2023-05-14 | End: 2023-05-14 | Stop reason: ALTCHOICE

## 2023-05-14 RX ORDER — PROPOFOL 10 MG/ML
INJECTION, EMULSION INTRAVENOUS PRN
Status: DISCONTINUED | OUTPATIENT
Start: 2023-05-14 | End: 2023-05-14 | Stop reason: SDUPTHER

## 2023-05-14 RX ORDER — SUCCINYLCHOLINE CHLORIDE 20 MG/ML
INJECTION INTRAMUSCULAR; INTRAVENOUS PRN
Status: DISCONTINUED | OUTPATIENT
Start: 2023-05-14 | End: 2023-05-14 | Stop reason: SDUPTHER

## 2023-05-14 RX ORDER — KETOROLAC TROMETHAMINE 30 MG/ML
15 INJECTION, SOLUTION INTRAMUSCULAR; INTRAVENOUS ONCE
Status: COMPLETED | OUTPATIENT
Start: 2023-05-14 | End: 2023-05-14

## 2023-05-14 RX ORDER — SODIUM CHLORIDE 9 MG/ML
25 INJECTION, SOLUTION INTRAVENOUS PRN
Status: DISCONTINUED | OUTPATIENT
Start: 2023-05-14 | End: 2023-05-14 | Stop reason: HOSPADM

## 2023-05-14 RX ORDER — NALOXONE HYDROCHLORIDE 0.4 MG/ML
INJECTION, SOLUTION INTRAMUSCULAR; INTRAVENOUS; SUBCUTANEOUS
Status: COMPLETED
Start: 2023-05-14 | End: 2023-05-14

## 2023-05-14 RX ORDER — FENTANYL CITRATE 50 UG/ML
INJECTION, SOLUTION INTRAMUSCULAR; INTRAVENOUS PRN
Status: DISCONTINUED | OUTPATIENT
Start: 2023-05-14 | End: 2023-05-14 | Stop reason: SDUPTHER

## 2023-05-14 RX ORDER — 0.9 % SODIUM CHLORIDE 0.9 %
500 INTRAVENOUS SOLUTION INTRAVENOUS ONCE
Status: COMPLETED | OUTPATIENT
Start: 2023-05-14 | End: 2023-05-14

## 2023-05-14 RX ORDER — MORPHINE SULFATE 4 MG/ML
4 INJECTION, SOLUTION INTRAMUSCULAR; INTRAVENOUS
Status: DISCONTINUED | OUTPATIENT
Start: 2023-05-14 | End: 2023-05-16

## 2023-05-14 RX ORDER — OXYCODONE HYDROCHLORIDE 5 MG/1
10 TABLET ORAL PRN
Status: DISCONTINUED | OUTPATIENT
Start: 2023-05-14 | End: 2023-05-14 | Stop reason: HOSPADM

## 2023-05-14 RX ORDER — DIPHENHYDRAMINE HYDROCHLORIDE 50 MG/ML
6.25 INJECTION INTRAMUSCULAR; INTRAVENOUS
Status: DISCONTINUED | OUTPATIENT
Start: 2023-05-14 | End: 2023-05-14 | Stop reason: HOSPADM

## 2023-05-14 RX ORDER — LIDOCAINE HYDROCHLORIDE 20 MG/ML
INJECTION, SOLUTION EPIDURAL; INFILTRATION; INTRACAUDAL; PERINEURAL PRN
Status: DISCONTINUED | OUTPATIENT
Start: 2023-05-14 | End: 2023-05-14 | Stop reason: SDUPTHER

## 2023-05-14 RX ORDER — SODIUM CHLORIDE 0.9 % (FLUSH) 0.9 %
5-40 SYRINGE (ML) INJECTION EVERY 12 HOURS SCHEDULED
Status: DISCONTINUED | OUTPATIENT
Start: 2023-05-14 | End: 2023-05-14 | Stop reason: HOSPADM

## 2023-05-14 RX ORDER — MORPHINE SULFATE 2 MG/ML
2 INJECTION, SOLUTION INTRAMUSCULAR; INTRAVENOUS
Status: DISCONTINUED | OUTPATIENT
Start: 2023-05-14 | End: 2023-05-16

## 2023-05-14 RX ORDER — ONDANSETRON 2 MG/ML
4 INJECTION INTRAMUSCULAR; INTRAVENOUS EVERY 30 MIN PRN
Status: DISCONTINUED | OUTPATIENT
Start: 2023-05-14 | End: 2023-05-14 | Stop reason: HOSPADM

## 2023-05-14 RX ADMIN — CEFAZOLIN 2000 MG: 10 INJECTION, POWDER, FOR SOLUTION INTRAVENOUS at 20:36

## 2023-05-14 RX ADMIN — Medication 2000 MG: at 11:20

## 2023-05-14 RX ADMIN — PROPOFOL 80 MG: 10 INJECTION, EMULSION INTRAVENOUS at 11:15

## 2023-05-14 RX ADMIN — FENTANYL CITRATE 25 MCG: 50 INJECTION, SOLUTION INTRAMUSCULAR; INTRAVENOUS at 13:13

## 2023-05-14 RX ADMIN — MORPHINE SULFATE 4 MG: 4 INJECTION, SOLUTION INTRAMUSCULAR; INTRAVENOUS at 00:59

## 2023-05-14 RX ADMIN — KETOROLAC TROMETHAMINE 15 MG: 30 INJECTION, SOLUTION INTRAMUSCULAR at 14:38

## 2023-05-14 RX ADMIN — ROCURONIUM BROMIDE 50 MG: 10 SOLUTION INTRAVENOUS at 11:22

## 2023-05-14 RX ADMIN — FENTANYL CITRATE 25 MCG: 50 INJECTION, SOLUTION INTRAMUSCULAR; INTRAVENOUS at 13:19

## 2023-05-14 RX ADMIN — Medication 100 MCG: at 11:43

## 2023-05-14 RX ADMIN — SUGAMMADEX 200 MG: 100 INJECTION, SOLUTION INTRAVENOUS at 13:10

## 2023-05-14 RX ADMIN — SUCCINYLCHOLINE CHLORIDE 100 MG: 20 INJECTION, SOLUTION INTRAMUSCULAR; INTRAVENOUS at 11:16

## 2023-05-14 RX ADMIN — HYDROMORPHONE HYDROCHLORIDE 0.5 MG: 1 INJECTION, SOLUTION INTRAMUSCULAR; INTRAVENOUS; SUBCUTANEOUS at 13:30

## 2023-05-14 RX ADMIN — SODIUM CHLORIDE: 9 INJECTION, SOLUTION INTRAVENOUS at 10:29

## 2023-05-14 RX ADMIN — MORPHINE SULFATE 4 MG: 4 INJECTION, SOLUTION INTRAMUSCULAR; INTRAVENOUS at 08:22

## 2023-05-14 RX ADMIN — HYDROMORPHONE HYDROCHLORIDE 0.5 MG: 1 INJECTION, SOLUTION INTRAMUSCULAR; INTRAVENOUS; SUBCUTANEOUS at 13:39

## 2023-05-14 RX ADMIN — SODIUM CHLORIDE: 9 INJECTION, SOLUTION INTRAVENOUS at 16:44

## 2023-05-14 RX ADMIN — SODIUM CHLORIDE 500 ML: 9 INJECTION, SOLUTION INTRAVENOUS at 17:45

## 2023-05-14 RX ADMIN — HYDROMORPHONE HYDROCHLORIDE 0.5 MG: 1 INJECTION, SOLUTION INTRAMUSCULAR; INTRAVENOUS; SUBCUTANEOUS at 13:47

## 2023-05-14 RX ADMIN — ACETAMINOPHEN 650 MG: 325 TABLET ORAL at 15:28

## 2023-05-14 RX ADMIN — Medication 100 MCG: at 12:00

## 2023-05-14 RX ADMIN — NALOXONE HYDROCHLORIDE 0.4 MG: 0.4 INJECTION, SOLUTION INTRAMUSCULAR; INTRAVENOUS; SUBCUTANEOUS at 14:15

## 2023-05-14 RX ADMIN — OXYCODONE 5 MG: 5 TABLET ORAL at 23:01

## 2023-05-14 RX ADMIN — LIDOCAINE HYDROCHLORIDE 50 MG: 20 INJECTION, SOLUTION EPIDURAL; INFILTRATION; INTRACAUDAL; PERINEURAL at 11:15

## 2023-05-14 ASSESSMENT — PAIN SCALES - GENERAL
PAINLEVEL_OUTOF10: 9
PAINLEVEL_OUTOF10: 10
PAINLEVEL_OUTOF10: 8
PAINLEVEL_OUTOF10: 10
PAINLEVEL_OUTOF10: 7
PAINLEVEL_OUTOF10: 10
PAINLEVEL_OUTOF10: 9
PAINLEVEL_OUTOF10: 9

## 2023-05-14 ASSESSMENT — PAIN DESCRIPTION - LOCATION
LOCATION: HIP;LEG
LOCATION: HIP

## 2023-05-14 ASSESSMENT — PAIN DESCRIPTION - PAIN TYPE: TYPE: SURGICAL PAIN

## 2023-05-14 ASSESSMENT — PAIN DESCRIPTION - ORIENTATION
ORIENTATION: LEFT
ORIENTATION: LEFT

## 2023-05-14 ASSESSMENT — PAIN SCALES - WONG BAKER: WONGBAKER_NUMERICALRESPONSE: 0

## 2023-05-14 ASSESSMENT — PAIN DESCRIPTION - DESCRIPTORS
DESCRIPTORS: ACHING;SPASM
DESCRIPTORS: SHARP;SHOOTING

## 2023-05-14 ASSESSMENT — PAIN DESCRIPTION - ONSET: ONSET: ON-GOING

## 2023-05-14 ASSESSMENT — PAIN DESCRIPTION - FREQUENCY: FREQUENCY: CONTINUOUS

## 2023-05-15 PROBLEM — S72.145A CLOSED NONDISPLACED INTERTROCHANTERIC FRACTURE OF LEFT FEMUR (HCC): Status: ACTIVE | Noted: 2023-05-15

## 2023-05-15 LAB
ANION GAP SERPL CALCULATED.3IONS-SCNC: 9 MMOL/L (ref 3–16)
BASOPHILS # BLD: 0 K/UL (ref 0–0.2)
BASOPHILS NFR BLD: 0.6 %
BUN SERPL-MCNC: 14 MG/DL (ref 7–20)
CALCIUM SERPL-MCNC: 7.7 MG/DL (ref 8.3–10.6)
CHLORIDE SERPL-SCNC: 106 MMOL/L (ref 99–110)
CO2 SERPL-SCNC: 24 MMOL/L (ref 21–32)
CREAT SERPL-MCNC: 0.6 MG/DL (ref 0.6–1.2)
DEPRECATED RDW RBC AUTO: 14.7 % (ref 12.4–15.4)
EKG ATRIAL RATE: 192 BPM
EKG DIAGNOSIS: NORMAL
EKG Q-T INTERVAL: 316 MS
EKG QRS DURATION: 86 MS
EKG QTC CALCULATION (BAZETT): 448 MS
EKG R AXIS: 14 DEGREES
EKG T AXIS: -86 DEGREES
EKG VENTRICULAR RATE: 121 BPM
EOSINOPHIL # BLD: 0 K/UL (ref 0–0.6)
EOSINOPHIL NFR BLD: 0.1 %
GFR SERPLBLD CREATININE-BSD FMLA CKD-EPI: >60 ML/MIN/{1.73_M2}
GLUCOSE SERPL-MCNC: 109 MG/DL (ref 70–99)
HCT VFR BLD AUTO: 30.9 % (ref 36–48)
HGB BLD-MCNC: 10.2 G/DL (ref 12–16)
LYMPHOCYTES # BLD: 1.9 K/UL (ref 1–5.1)
LYMPHOCYTES NFR BLD: 25.9 %
MAGNESIUM SERPL-MCNC: 1.9 MG/DL (ref 1.8–2.4)
MCH RBC QN AUTO: 31.8 PG (ref 26–34)
MCHC RBC AUTO-ENTMCNC: 32.9 G/DL (ref 31–36)
MCV RBC AUTO: 96.7 FL (ref 80–100)
MONOCYTES # BLD: 1.2 K/UL (ref 0–1.3)
MONOCYTES NFR BLD: 16.3 %
NEUTROPHILS # BLD: 4.3 K/UL (ref 1.7–7.7)
NEUTROPHILS NFR BLD: 57.1 %
PLATELET # BLD AUTO: 126 K/UL (ref 135–450)
PMV BLD AUTO: 8.2 FL (ref 5–10.5)
POTASSIUM SERPL-SCNC: 3.9 MMOL/L (ref 3.5–5.1)
RBC # BLD AUTO: 3.2 M/UL (ref 4–5.2)
REASON FOR REJECTION: NORMAL
REJECTED TEST: NORMAL
SODIUM SERPL-SCNC: 139 MMOL/L (ref 136–145)
TSH SERPL DL<=0.005 MIU/L-ACNC: 1.17 UIU/ML (ref 0.27–4.2)
WBC # BLD AUTO: 7.5 K/UL (ref 4–11)

## 2023-05-15 PROCEDURE — 6360000002 HC RX W HCPCS: Performed by: STUDENT IN AN ORGANIZED HEALTH CARE EDUCATION/TRAINING PROGRAM

## 2023-05-15 PROCEDURE — 80048 BASIC METABOLIC PNL TOTAL CA: CPT

## 2023-05-15 PROCEDURE — 84443 ASSAY THYROID STIM HORMONE: CPT

## 2023-05-15 PROCEDURE — 6370000000 HC RX 637 (ALT 250 FOR IP): Performed by: NURSE PRACTITIONER

## 2023-05-15 PROCEDURE — 6360000002 HC RX W HCPCS: Performed by: NURSE PRACTITIONER

## 2023-05-15 PROCEDURE — 83036 HEMOGLOBIN GLYCOSYLATED A1C: CPT

## 2023-05-15 PROCEDURE — 2060000000 HC ICU INTERMEDIATE R&B

## 2023-05-15 PROCEDURE — 93005 ELECTROCARDIOGRAM TRACING: CPT | Performed by: INTERNAL MEDICINE

## 2023-05-15 PROCEDURE — 6370000000 HC RX 637 (ALT 250 FOR IP): Performed by: STUDENT IN AN ORGANIZED HEALTH CARE EDUCATION/TRAINING PROGRAM

## 2023-05-15 PROCEDURE — 97535 SELF CARE MNGMENT TRAINING: CPT

## 2023-05-15 PROCEDURE — 97530 THERAPEUTIC ACTIVITIES: CPT

## 2023-05-15 PROCEDURE — 97166 OT EVAL MOD COMPLEX 45 MIN: CPT

## 2023-05-15 PROCEDURE — 94761 N-INVAS EAR/PLS OXIMETRY MLT: CPT

## 2023-05-15 PROCEDURE — 2580000003 HC RX 258: Performed by: STUDENT IN AN ORGANIZED HEALTH CARE EDUCATION/TRAINING PROGRAM

## 2023-05-15 PROCEDURE — 85025 COMPLETE CBC W/AUTO DIFF WBC: CPT

## 2023-05-15 PROCEDURE — 6370000000 HC RX 637 (ALT 250 FOR IP): Performed by: INTERNAL MEDICINE

## 2023-05-15 PROCEDURE — 97162 PT EVAL MOD COMPLEX 30 MIN: CPT

## 2023-05-15 PROCEDURE — 36415 COLL VENOUS BLD VENIPUNCTURE: CPT

## 2023-05-15 PROCEDURE — 83735 ASSAY OF MAGNESIUM: CPT

## 2023-05-15 PROCEDURE — 97110 THERAPEUTIC EXERCISES: CPT

## 2023-05-15 PROCEDURE — 2700000000 HC OXYGEN THERAPY PER DAY

## 2023-05-15 RX ORDER — AMIODARONE HYDROCHLORIDE 200 MG/1
400 TABLET ORAL 2 TIMES DAILY
Status: DISCONTINUED | OUTPATIENT
Start: 2023-05-15 | End: 2023-05-17 | Stop reason: HOSPADM

## 2023-05-15 RX ORDER — ENOXAPARIN SODIUM 100 MG/ML
40 INJECTION SUBCUTANEOUS DAILY
Status: DISCONTINUED | OUTPATIENT
Start: 2023-05-15 | End: 2023-05-15

## 2023-05-15 RX ORDER — MIDODRINE HYDROCHLORIDE 5 MG/1
10 TABLET ORAL ONCE
Status: COMPLETED | OUTPATIENT
Start: 2023-05-15 | End: 2023-05-15

## 2023-05-15 RX ORDER — PROPRANOLOL HYDROCHLORIDE 10 MG/1
10 TABLET ORAL 2 TIMES DAILY
Status: DISCONTINUED | OUTPATIENT
Start: 2023-05-15 | End: 2023-05-17 | Stop reason: HOSPADM

## 2023-05-15 RX ORDER — AMIODARONE HYDROCHLORIDE 200 MG/1
400 TABLET ORAL 2 TIMES DAILY
Status: DISCONTINUED | OUTPATIENT
Start: 2023-05-15 | End: 2023-05-15

## 2023-05-15 RX ADMIN — MORPHINE SULFATE 2 MG: 2 INJECTION, SOLUTION INTRAMUSCULAR; INTRAVENOUS at 01:35

## 2023-05-15 RX ADMIN — OXYCODONE 5 MG: 5 TABLET ORAL at 15:45

## 2023-05-15 RX ADMIN — OXYCODONE 5 MG: 5 TABLET ORAL at 08:27

## 2023-05-15 RX ADMIN — AMIODARONE HYDROCHLORIDE 400 MG: 200 TABLET ORAL at 18:08

## 2023-05-15 RX ADMIN — MIDODRINE HYDROCHLORIDE 10 MG: 5 TABLET ORAL at 20:41

## 2023-05-15 RX ADMIN — CITALOPRAM HYDROBROMIDE 20 MG: 20 TABLET ORAL at 20:41

## 2023-05-15 RX ADMIN — PROPRANOLOL HYDROCHLORIDE 10 MG: 10 TABLET ORAL at 21:44

## 2023-05-15 RX ADMIN — APIXABAN 2.5 MG: 2.5 TABLET, FILM COATED ORAL at 20:41

## 2023-05-15 RX ADMIN — Medication 10 ML: at 08:28

## 2023-05-15 RX ADMIN — ENOXAPARIN SODIUM 40 MG: 100 INJECTION SUBCUTANEOUS at 11:54

## 2023-05-15 RX ADMIN — Medication 10 ML: at 20:40

## 2023-05-15 RX ADMIN — ASPIRIN 81 MG: 81 TABLET, COATED ORAL at 08:27

## 2023-05-15 RX ADMIN — CALCIUM 1000 MG: 500 TABLET ORAL at 08:28

## 2023-05-15 RX ADMIN — OXYCODONE 5 MG: 5 TABLET ORAL at 23:28

## 2023-05-15 RX ADMIN — CEFAZOLIN 2000 MG: 10 INJECTION, POWDER, FOR SOLUTION INTRAVENOUS at 02:14

## 2023-05-15 RX ADMIN — PROPRANOLOL HYDROCHLORIDE 10 MG: 10 TABLET ORAL at 11:54

## 2023-05-15 RX ADMIN — MORPHINE SULFATE 2 MG: 2 INJECTION, SOLUTION INTRAMUSCULAR; INTRAVENOUS at 11:52

## 2023-05-15 ASSESSMENT — PAIN DESCRIPTION - ONSET
ONSET: ON-GOING

## 2023-05-15 ASSESSMENT — PAIN DESCRIPTION - DESCRIPTORS
DESCRIPTORS: ACHING
DESCRIPTORS: SHARP;SHOOTING

## 2023-05-15 ASSESSMENT — PAIN SCALES - GENERAL
PAINLEVEL_OUTOF10: 0
PAINLEVEL_OUTOF10: 7
PAINLEVEL_OUTOF10: 8
PAINLEVEL_OUTOF10: 6
PAINLEVEL_OUTOF10: 8
PAINLEVEL_OUTOF10: 5
PAINLEVEL_OUTOF10: 9
PAINLEVEL_OUTOF10: 5
PAINLEVEL_OUTOF10: 3
PAINLEVEL_OUTOF10: 0
PAINLEVEL_OUTOF10: 6
PAINLEVEL_OUTOF10: 6
PAINLEVEL_OUTOF10: 0
PAINLEVEL_OUTOF10: 5

## 2023-05-15 ASSESSMENT — PAIN DESCRIPTION - LOCATION
LOCATION: HIP
LOCATION: LEG;HIP
LOCATION: HIP;LEG
LOCATION: LEG;HIP
LOCATION: LEG;HIP

## 2023-05-15 ASSESSMENT — PAIN DESCRIPTION - FREQUENCY
FREQUENCY: CONTINUOUS

## 2023-05-15 ASSESSMENT — PAIN DESCRIPTION - ORIENTATION
ORIENTATION: LEFT

## 2023-05-15 ASSESSMENT — PAIN - FUNCTIONAL ASSESSMENT: PAIN_FUNCTIONAL_ASSESSMENT: PREVENTS OR INTERFERES WITH ALL ACTIVE AND SOME PASSIVE ACTIVITIES

## 2023-05-15 ASSESSMENT — PAIN DESCRIPTION - PAIN TYPE
TYPE: SURGICAL PAIN

## 2023-05-15 NOTE — SIGNIFICANT EVENT
Perfect Serve to Dorota Iverson at 2:07 PM    Situation: -Back in AFIB -120's. 12 Lead EKG confirmation.  Asymptomatic     Response   Mode: Perfect Serve    Action  Consult to Cardiology

## 2023-05-15 NOTE — SIGNIFICANT EVENT
Perfect Serve to Carrol Gongora at 6:20 PM    Situation:   AF -160's short runs/ returns to NSR intermittently. PO amiodarone just given. Cotton removal at 1200. Has not urinated. Bladder scan 140 ml  BP low 95/62 MAP 73. Asymptomatic. Response   Mode: Perfect Serve: 6:46    Action  Repeat BP in 30 minutes.

## 2023-05-15 NOTE — SIGNIFICANT EVENT
Perfect Serve to William Wu  at 08:00 AM    Situation: Intermittent periods of Atrial Fib -140's.  Returns to NSR     Response   Mode: Perfect serve/ In person    Action  Restart home propranolol  Continue to monitor

## 2023-05-15 NOTE — CARE COORDINATION
Case Management Assessment  Initial Evaluation    Date/Time of Evaluation: 5/15/2023 2:08 PM  Assessment Completed by: Delores Jose RN    If patient is discharged prior to next notation, then this note serves as note for discharge by case management. Patient Name: Rory Fraser                   YOB: 1935  Diagnosis: Fall, initial encounter [W19. XXXA]  Closed nondisplaced intertrochanteric fracture of left femur, initial encounter ([facility] [address] [S72.145A]  Intertrochanteric fracture of left hip, closed, initial encounter [facility]) Camila Graham                   Date / Time: 5/13/2023  7:06 PM    Patient Admission Status: Inpatient   Readmission Risk (Low < 19, Mod (19-27), High > 27): Readmission Risk Score: 14    Current PCP: DEBBIE Wells CNP  PCP verified by CM? Yes    Chart Reviewed: Yes      History Provided by: Patient  Patient Orientation: Alert and Oriented, Person, Place, Situation, Self    Patient Cognition: Alert    Hospitalization in the last 30 days (Readmission):  No    If yes, Readmission Assessment in CM Navigator will be completed. Advance Directives:      Code Status: Full Code   Patient's Primary Decision Maker is:        Discharge Planning:    Patient lives with: Alone Type of Home: House  Primary Care Giver: Self  Patient Support Systems include: Children, Family Members, Friends/Neighbors, [employer]   Current Financial resources: Laurel Woodardlucy  Current community resources: Other (Comment) ([address] for house cleaning once every other week)  Current services prior to admission: [employer], Meals On Wheels            Current DME: Cane, Shower Chair, Other (Comment) (grab bars)            Type of Home Care services:  Housekeeping    ADLS  Prior functional level: Independent in ADLs/IADLs  Current functional level:      PT AM-PAC:   /24  OT AM-PAC:   /24    Family can provide assistance at DC:  Yes  Would you like Case Management to discuss the

## 2023-05-15 NOTE — CONSULTS
atrial tachycardia. Patient was insistent that I do not call her family. She is not demented however she does provide a poor history. I abided by her wishes I discussed atrial fibrillation in detail with her. Afib risk factors including age, HTN, obesity, inactivity and EUNICE were discussed with patient. Risk factor modification recommended. Patient's [id number] score is 3 with a stroke risk of 3.5%. We discussed oral anticoagulation to decrease the risk of thromboembolic events including stroke. Benefits and alternatives were discussed with patient. Risk of bleeding was discussed. Patient verbalized understanding. Different forms of anticoagulants including warfarin (Coumadin), Dabigatran (Pradaxa), Rivaroxaban (Xarelto), and Apixaban (Eliquis) were discussed. Patient opted to start with apixaban. Given recent falls we will need to be cautious. Rate control strategy options including cardioversion, atrial fibrillation ablation, pacemaker with AVN ablation, anti-arrhythmic strategy, and rate control strategy were discussed with patient. Risks, benefits and alternative of each treatment options were explained. All questions were answered. Patient has opted for rate control however her blood pressure is slow. Given her age we will start her on amiodarone as she is in and out atrial fibrillation.  - I will order thyroid function testing.  - I will order hemoglobin A1c.  - Start on apixaban 2.5 mg BID (age and weight). Consider Watchman as outpatient.  - Start on amiodarone 400 mg BID.  - Continue propranolol. - I will order echocardiogram.   - We will continue to follow along with you. 2. Left hip fracture. - Per ortho. Thank you for allowing me to participate in the care of Romy Mosley . If you have any questions/comments, please do not hesitate to contact us.     Tiffanie Pozo MD  Cardiac Electrophysiology  [address] [facility]  [phone] [facility]

## 2023-05-15 NOTE — PLAN OF CARE
Problem: Discharge Planning  Goal: Discharge to home or other facility with appropriate resources  5/15/2023 1401 by Alma Delia Zhang RN  Outcome: Progressing  Flowsheets (Taken 5/15/2023 0800)  Discharge to home or other facility with appropriate resources: Identify barriers to discharge with patient and caregiver  5/15/2023 0206 by Bismark Grider RN  Outcome: Progressing     Problem: Pain  Goal: Verbalizes/displays adequate comfort level or baseline comfort level  5/15/2023 1401 by Alma Delia Zhang RN  Outcome: Progressing  Flowsheets (Taken 5/15/2023 1401)  Verbalizes/displays adequate comfort level or baseline comfort level:   Encourage patient to monitor pain and request assistance   Assess pain using appropriate pain scale   Administer analgesics based on type and severity of pain and evaluate response   Implement non-pharmacological measures as appropriate and evaluate response  5/15/2023 0206 by Bismark Grider RN  Outcome: Progressing     Problem: Skin/Tissue Integrity  Goal: Absence of new skin breakdown  Description: 1. Monitor for areas of redness and/or skin breakdown  2. Assess vascular access sites hourly  3. Every 4-6 hours minimum:  Change oxygen saturation probe site  4. Every 4-6 hours:  If on nasal continuous positive airway pressure, respiratory therapy assess nares and determine need for appliance change or resting period.   5/15/2023 1401 by Alma Delia Zhang RN  Outcome: Progressing  5/15/2023 0206 by Bismark Grider RN  Outcome: Progressing     Problem: ABCDS Injury Assessment  Goal: Absence of physical injury  5/15/2023 0206 by Bismark Grider RN  Outcome: Progressing     Problem: Safety - Adult  Goal: Free from fall injury  5/15/2023 1401 by Alma Delia Zhang RN  Outcome: Progressing  5/15/2023 0206 by Bismark Grider RN  Outcome: Progressing

## 2023-05-16 LAB
ANION GAP SERPL CALCULATED.3IONS-SCNC: 6 MMOL/L (ref 3–16)
BASOPHILS # BLD: 0 K/UL (ref 0–0.2)
BASOPHILS NFR BLD: 0.4 %
BUN SERPL-MCNC: 14 MG/DL (ref 7–20)
CALCIUM SERPL-MCNC: 8.1 MG/DL (ref 8.3–10.6)
CHLORIDE SERPL-SCNC: 103 MMOL/L (ref 99–110)
CO2 SERPL-SCNC: 27 MMOL/L (ref 21–32)
CREAT SERPL-MCNC: <0.5 MG/DL (ref 0.6–1.2)
DEPRECATED RDW RBC AUTO: 14.4 % (ref 12.4–15.4)
EOSINOPHIL # BLD: 0 K/UL (ref 0–0.6)
EOSINOPHIL NFR BLD: 0.3 %
EST. AVERAGE GLUCOSE BLD GHB EST-MCNC: 111.2 MG/DL
GFR SERPLBLD CREATININE-BSD FMLA CKD-EPI: >60 ML/MIN/{1.73_M2}
GLUCOSE SERPL-MCNC: 146 MG/DL (ref 70–99)
HBA1C MFR BLD: 5.5 %
HCT VFR BLD AUTO: 26.7 % (ref 36–48)
HGB BLD-MCNC: 8.9 G/DL (ref 12–16)
LV EF: 58 %
LVEF MODALITY: NORMAL
LYMPHOCYTES # BLD: 1.2 K/UL (ref 1–5.1)
LYMPHOCYTES NFR BLD: 19.6 %
MCH RBC QN AUTO: 31.9 PG (ref 26–34)
MCHC RBC AUTO-ENTMCNC: 33.5 G/DL (ref 31–36)
MCV RBC AUTO: 95.4 FL (ref 80–100)
MONOCYTES # BLD: 1 K/UL (ref 0–1.3)
MONOCYTES NFR BLD: 16.3 %
NEUTROPHILS # BLD: 3.9 K/UL (ref 1.7–7.7)
NEUTROPHILS NFR BLD: 63.4 %
PLATELET # BLD AUTO: 120 K/UL (ref 135–450)
PMV BLD AUTO: 8.3 FL (ref 5–10.5)
POTASSIUM SERPL-SCNC: 4.1 MMOL/L (ref 3.5–5.1)
RBC # BLD AUTO: 2.8 M/UL (ref 4–5.2)
SODIUM SERPL-SCNC: 136 MMOL/L (ref 136–145)
WBC # BLD AUTO: 6.1 K/UL (ref 4–11)

## 2023-05-16 PROCEDURE — 36415 COLL VENOUS BLD VENIPUNCTURE: CPT

## 2023-05-16 PROCEDURE — 6370000000 HC RX 637 (ALT 250 FOR IP): Performed by: STUDENT IN AN ORGANIZED HEALTH CARE EDUCATION/TRAINING PROGRAM

## 2023-05-16 PROCEDURE — 6370000000 HC RX 637 (ALT 250 FOR IP): Performed by: INTERNAL MEDICINE

## 2023-05-16 PROCEDURE — 97110 THERAPEUTIC EXERCISES: CPT

## 2023-05-16 PROCEDURE — 97530 THERAPEUTIC ACTIVITIES: CPT

## 2023-05-16 PROCEDURE — 80048 BASIC METABOLIC PNL TOTAL CA: CPT

## 2023-05-16 PROCEDURE — 93306 TTE W/DOPPLER COMPLETE: CPT

## 2023-05-16 PROCEDURE — 97535 SELF CARE MNGMENT TRAINING: CPT

## 2023-05-16 PROCEDURE — 6370000000 HC RX 637 (ALT 250 FOR IP): Performed by: SPECIALIST/TECHNOLOGIST

## 2023-05-16 PROCEDURE — 2580000003 HC RX 258: Performed by: STUDENT IN AN ORGANIZED HEALTH CARE EDUCATION/TRAINING PROGRAM

## 2023-05-16 PROCEDURE — 85025 COMPLETE CBC W/AUTO DIFF WBC: CPT

## 2023-05-16 PROCEDURE — 2060000000 HC ICU INTERMEDIATE R&B

## 2023-05-16 PROCEDURE — 6370000000 HC RX 637 (ALT 250 FOR IP): Performed by: NURSE PRACTITIONER

## 2023-05-16 RX ORDER — CYCLOBENZAPRINE HCL 10 MG
10 TABLET ORAL 3 TIMES DAILY PRN
DISCHARGE
Start: 2023-05-16 | End: 2023-05-26

## 2023-05-16 RX ORDER — DOCUSATE SODIUM 100 MG/1
100 CAPSULE, LIQUID FILLED ORAL 2 TIMES DAILY
Status: DISCONTINUED | OUTPATIENT
Start: 2023-05-16 | End: 2023-05-17 | Stop reason: HOSPADM

## 2023-05-16 RX ORDER — OXYCODONE HYDROCHLORIDE 5 MG/1
10 TABLET ORAL EVERY 4 HOURS PRN
Status: DISCONTINUED | OUTPATIENT
Start: 2023-05-16 | End: 2023-05-17 | Stop reason: HOSPADM

## 2023-05-16 RX ORDER — POLYETHYLENE GLYCOL 3350 17 G/17G
17 POWDER, FOR SOLUTION ORAL DAILY
Status: DISCONTINUED | OUTPATIENT
Start: 2023-05-16 | End: 2023-05-17 | Stop reason: HOSPADM

## 2023-05-16 RX ORDER — ACETAMINOPHEN 325 MG/1
650 TABLET ORAL EVERY 6 HOURS
Status: DISCONTINUED | OUTPATIENT
Start: 2023-05-16 | End: 2023-05-17 | Stop reason: HOSPADM

## 2023-05-16 RX ORDER — OXYCODONE HYDROCHLORIDE 5 MG/1
5 TABLET ORAL EVERY 6 HOURS PRN
Qty: 28 TABLET | Refills: 0 | Status: SHIPPED | OUTPATIENT
Start: 2023-05-16 | End: 2023-05-23

## 2023-05-16 RX ORDER — POLYETHYLENE GLYCOL 3350 17 G/17G
17 POWDER, FOR SOLUTION ORAL DAILY
Qty: 527 G | Refills: 1 | DISCHARGE
Start: 2023-05-17 | End: 2023-06-16

## 2023-05-16 RX ORDER — ACETAMINOPHEN 325 MG/1
650 TABLET ORAL EVERY 6 HOURS
Qty: 120 TABLET | Refills: 3 | DISCHARGE
Start: 2023-05-17

## 2023-05-16 RX ORDER — OXYCODONE HYDROCHLORIDE 5 MG/1
5 TABLET ORAL EVERY 4 HOURS PRN
Status: DISCONTINUED | OUTPATIENT
Start: 2023-05-16 | End: 2023-05-17 | Stop reason: HOSPADM

## 2023-05-16 RX ADMIN — DOCUSATE SODIUM 100 MG: 100 CAPSULE, LIQUID FILLED ORAL at 15:36

## 2023-05-16 RX ADMIN — CALCIUM 1000 MG: 500 TABLET ORAL at 07:50

## 2023-05-16 RX ADMIN — APIXABAN 2.5 MG: 2.5 TABLET, FILM COATED ORAL at 21:14

## 2023-05-16 RX ADMIN — ACETAMINOPHEN 650 MG: 325 TABLET ORAL at 21:13

## 2023-05-16 RX ADMIN — ASPIRIN 81 MG: 81 TABLET, COATED ORAL at 07:49

## 2023-05-16 RX ADMIN — ACETAMINOPHEN 650 MG: 325 TABLET ORAL at 18:37

## 2023-05-16 RX ADMIN — OXYCODONE 5 MG: 5 TABLET ORAL at 07:55

## 2023-05-16 RX ADMIN — CYCLOBENZAPRINE 10 MG: 10 TABLET, FILM COATED ORAL at 07:55

## 2023-05-16 RX ADMIN — AMIODARONE HYDROCHLORIDE 400 MG: 200 TABLET ORAL at 21:14

## 2023-05-16 RX ADMIN — Medication 10 ML: at 21:16

## 2023-05-16 RX ADMIN — Medication 10 ML: at 07:50

## 2023-05-16 RX ADMIN — AMIODARONE HYDROCHLORIDE 400 MG: 200 TABLET ORAL at 07:49

## 2023-05-16 RX ADMIN — CITALOPRAM HYDROBROMIDE 20 MG: 20 TABLET ORAL at 21:14

## 2023-05-16 RX ADMIN — DOCUSATE SODIUM 100 MG: 100 CAPSULE, LIQUID FILLED ORAL at 21:14

## 2023-05-16 RX ADMIN — APIXABAN 2.5 MG: 2.5 TABLET, FILM COATED ORAL at 07:49

## 2023-05-16 RX ADMIN — PROPRANOLOL HYDROCHLORIDE 10 MG: 10 TABLET ORAL at 07:49

## 2023-05-16 RX ADMIN — POLYETHYLENE GLYCOL 3350 17 G: 17 POWDER, FOR SOLUTION ORAL at 18:37

## 2023-05-16 RX ADMIN — PROPRANOLOL HYDROCHLORIDE 10 MG: 10 TABLET ORAL at 21:14

## 2023-05-16 RX ADMIN — OXYCODONE 5 MG: 5 TABLET ORAL at 15:36

## 2023-05-16 ASSESSMENT — PAIN DESCRIPTION - LOCATION: LOCATION: HIP

## 2023-05-16 ASSESSMENT — PAIN SCALES - WONG BAKER
WONGBAKER_NUMERICALRESPONSE: 0

## 2023-05-16 ASSESSMENT — PAIN SCALES - GENERAL
PAINLEVEL_OUTOF10: 1
PAINLEVEL_OUTOF10: 5

## 2023-05-16 NOTE — PLAN OF CARE
Problem: Discharge Planning  Goal: Discharge to home or other facility with appropriate resources  5/15/2023 2223 by Edgard Henley RN  Outcome: Progressing  5/15/2023 1401 by Juanito Jalloh RN  Outcome: Progressing  Flowsheets (Taken 5/15/2023 0800)  Discharge to home or other facility with appropriate resources: Identify barriers to discharge with patient and caregiver     Problem: Pain  Goal: Verbalizes/displays adequate comfort level or baseline comfort level  5/15/2023 2223 by Edgard Henley RN  Outcome: Progressing  Patient verbalizes satisfaction with comfort level. 5/15/2023 1401 by Juanito Jalloh RN  Outcome: Progressing  Flowsheets (Taken 5/15/2023 1401)  Verbalizes/displays adequate comfort level or baseline comfort level:   Encourage patient to monitor pain and request assistance   Assess pain using appropriate pain scale   Administer analgesics based on type and severity of pain and evaluate response   Implement non-pharmacological measures as appropriate and evaluate response     Problem: Skin/Tissue Integrity  Goal: Absence of new skin breakdown  Description: 1. Monitor for areas of redness and/or skin breakdown  2. Assess vascular access sites hourly  3. Every 4-6 hours minimum:  Change oxygen saturation probe site  4. Every 4-6 hours:  If on nasal continuous positive airway pressure, respiratory therapy assess nares and determine need for appliance change or resting period. 5/15/2023 2223 by Edgard Henley RN  Outcome: Progressing  Patient wound remains clean, dry and intact. 5/15/2023 1401 by Juanito Jalloh RN  Outcome: Progressing     Problem: Safety - Adult  Goal: Free from fall injury  5/15/2023 2223 by Edgard Henley RN  Outcome: Progressing  Patient calls out appropriately and remains free from falls.    5/15/2023 1401 by Juanito Jalloh RN  Outcome: Progressing

## 2023-05-17 VITALS
OXYGEN SATURATION: 97 % | SYSTOLIC BLOOD PRESSURE: 133 MMHG | BODY MASS INDEX: 24.24 KG/M2 | RESPIRATION RATE: 16 BRPM | TEMPERATURE: 98.4 F | HEIGHT: 64 IN | WEIGHT: 141.98 LBS | DIASTOLIC BLOOD PRESSURE: 77 MMHG | HEART RATE: 85 BPM

## 2023-05-17 PROBLEM — I48.0 PAROXYSMAL ATRIAL FIBRILLATION (HCC): Status: ACTIVE | Noted: 2023-05-17

## 2023-05-17 LAB
ANION GAP SERPL CALCULATED.3IONS-SCNC: 10 MMOL/L (ref 3–16)
BUN SERPL-MCNC: 9 MG/DL (ref 7–20)
CALCIUM SERPL-MCNC: 8 MG/DL (ref 8.3–10.6)
CHLORIDE SERPL-SCNC: 100 MMOL/L (ref 99–110)
CO2 SERPL-SCNC: 25 MMOL/L (ref 21–32)
CREAT SERPL-MCNC: <0.5 MG/DL (ref 0.6–1.2)
GFR SERPLBLD CREATININE-BSD FMLA CKD-EPI: >60 ML/MIN/{1.73_M2}
GLUCOSE SERPL-MCNC: 124 MG/DL (ref 70–99)
HCT VFR BLD AUTO: 27.1 % (ref 36–48)
HGB BLD-MCNC: 9.2 G/DL (ref 12–16)
POTASSIUM SERPL-SCNC: 3.7 MMOL/L (ref 3.5–5.1)
SODIUM SERPL-SCNC: 135 MMOL/L (ref 136–145)

## 2023-05-17 PROCEDURE — 6370000000 HC RX 637 (ALT 250 FOR IP): Performed by: NURSE PRACTITIONER

## 2023-05-17 PROCEDURE — 6370000000 HC RX 637 (ALT 250 FOR IP): Performed by: SPECIALIST/TECHNOLOGIST

## 2023-05-17 PROCEDURE — APPNB45 APP NON BILLABLE 31-45 MINUTES: Performed by: SPECIALIST/TECHNOLOGIST

## 2023-05-17 PROCEDURE — 97535 SELF CARE MNGMENT TRAINING: CPT

## 2023-05-17 PROCEDURE — 85014 HEMATOCRIT: CPT

## 2023-05-17 PROCEDURE — 80048 BASIC METABOLIC PNL TOTAL CA: CPT

## 2023-05-17 PROCEDURE — 36415 COLL VENOUS BLD VENIPUNCTURE: CPT

## 2023-05-17 PROCEDURE — 2580000003 HC RX 258: Performed by: STUDENT IN AN ORGANIZED HEALTH CARE EDUCATION/TRAINING PROGRAM

## 2023-05-17 PROCEDURE — 6370000000 HC RX 637 (ALT 250 FOR IP): Performed by: INTERNAL MEDICINE

## 2023-05-17 PROCEDURE — 97530 THERAPEUTIC ACTIVITIES: CPT

## 2023-05-17 PROCEDURE — 99024 POSTOP FOLLOW-UP VISIT: CPT | Performed by: SPECIALIST/TECHNOLOGIST

## 2023-05-17 PROCEDURE — 6370000000 HC RX 637 (ALT 250 FOR IP): Performed by: STUDENT IN AN ORGANIZED HEALTH CARE EDUCATION/TRAINING PROGRAM

## 2023-05-17 PROCEDURE — 85018 HEMOGLOBIN: CPT

## 2023-05-17 RX ORDER — AMIODARONE HYDROCHLORIDE 200 MG/1
200 TABLET ORAL DAILY
DISCHARGE
Start: 2023-05-17

## 2023-05-17 RX ORDER — PSEUDOEPHEDRINE HCL 30 MG
100 TABLET ORAL 2 TIMES DAILY
DISCHARGE
Start: 2023-05-17

## 2023-05-17 RX ORDER — METHOCARBAMOL 500 MG/1
500 TABLET, FILM COATED ORAL 3 TIMES DAILY
Status: DISCONTINUED | OUTPATIENT
Start: 2023-05-17 | End: 2023-05-17 | Stop reason: HOSPADM

## 2023-05-17 RX ORDER — AMIODARONE HYDROCHLORIDE 400 MG/1
400 TABLET ORAL 2 TIMES DAILY
DISCHARGE
Start: 2023-05-17 | End: 2023-05-17 | Stop reason: SDUPTHER

## 2023-05-17 RX ADMIN — ACETAMINOPHEN 650 MG: 325 TABLET ORAL at 11:54

## 2023-05-17 RX ADMIN — DOCUSATE SODIUM 100 MG: 100 CAPSULE, LIQUID FILLED ORAL at 08:18

## 2023-05-17 RX ADMIN — OXYCODONE 10 MG: 5 TABLET ORAL at 08:20

## 2023-05-17 RX ADMIN — METHOCARBAMOL 500 MG: 500 TABLET ORAL at 14:23

## 2023-05-17 RX ADMIN — ACETAMINOPHEN 650 MG: 325 TABLET ORAL at 04:44

## 2023-05-17 RX ADMIN — POLYETHYLENE GLYCOL 3350 17 G: 17 POWDER, FOR SOLUTION ORAL at 08:18

## 2023-05-17 RX ADMIN — AMIODARONE HYDROCHLORIDE 400 MG: 200 TABLET ORAL at 08:18

## 2023-05-17 RX ADMIN — CALCIUM 1000 MG: 500 TABLET ORAL at 08:18

## 2023-05-17 RX ADMIN — Medication 10 ML: at 08:21

## 2023-05-17 RX ADMIN — ASPIRIN 81 MG: 81 TABLET, COATED ORAL at 08:18

## 2023-05-17 RX ADMIN — PROPRANOLOL HYDROCHLORIDE 10 MG: 10 TABLET ORAL at 08:18

## 2023-05-17 RX ADMIN — APIXABAN 2.5 MG: 2.5 TABLET, FILM COATED ORAL at 08:18

## 2023-05-17 RX ADMIN — METHOCARBAMOL 500 MG: 500 TABLET ORAL at 11:54

## 2023-05-17 ASSESSMENT — PAIN SCALES - WONG BAKER
WONGBAKER_NUMERICALRESPONSE: 0

## 2023-05-17 ASSESSMENT — PAIN SCALES - GENERAL: PAINLEVEL_OUTOF10: 7

## 2023-05-17 NOTE — DISCHARGE INSTR - COC
Continuity of Care Form    Patient Name: Eleno Garcia   :  1935  MRN:  [id number]    Admit date:  2023  Discharge date:  2023    Code Status Order: Full Code   Advance Directives:   [facility] Documentation       Date/Time Healthcare Directive Type of Healthcare Directive Copy in [address] Agent's Name Healthcare Agent's Phone Number    [phone] No, patient does not have an advance directive for healthcare treatment -- -- -- -- --            Admitting Physician:  Micheal White MD  PCP: DEBBIE Lainez CNP    Discharging Nurse: [facility] Unit/Room#: 0220/0220-01  Discharging Unit Phone Number: ***    Emergency Contact:   Extended Emergency Contact Information  Primary Emergency Contact: [facility] Phone: [phone]  Relation: Child  Secondary Emergency Contact: sebastien bangura  Mobile Phone: [phone]  Relation: Trent 8    Past Surgical History:  Past Surgical History:   Procedure Laterality Date    CATARACT REMOVAL      COLONOSCOPY         Immunization History:   Immunization History   Administered Date(s) Administered    COVID-19, PFIZER PURPLE top, DILUTE for use, (age 15 y+), 30mcg/0.3mL 01/15/2021, 2021    DTaP 2010    Influenza 10/07/2006    Influenza Virus Vaccine 10/16/2003, 10/08/2005, 10/29/2007, 10/09/2008, 2009, 09/15/2010, 2011    Influenza, FLUAD, (age 72 y+), Adjuvanted, 0.5mL 2020, 2022    Influenza, FLUZONE (age 72 y+), High Dose, 0.7mL 10/03/2021    Influenza, High Dose (Fluzone 65 yrs and older) 10/10/2014, 2015, 10/14/2016, 10/06/2017, 10/05/2018    Influenza, Triv, inactivated, subunit, adjuvanted, IM (Fluad 65 yrs and older) 10/18/2019    Pneumococcal Conjugate 7-valent (Yanick Hardy) 03/10/2006    Pneumococcal, PCV-13, PREVNAR 15, (age 6w+), IM, 0.5mL 2015    Pneumococcal, PPSV23, PNEUMOVAX 23, (age 2y+), SC/IM, 0.5mL 2017

## 2023-05-17 NOTE — DISCHARGE SUMMARY
mouth daily. Current Discharge Medication List        STOP taking these medications       naproxen (NAPROSYN) 500 MG tablet Comments:   Reason for Stopping:               Significant Test Results    XR HIP LEFT (2-3 VIEWS)    Result Date: 5/13/2023  EXAMINATION: TWO XRAY VIEWS OF THE LEFT HIP 5/13/2023 7:21 pm COMPARISON: 08/12/2015 HISTORY: Acute pain status post fall. FINDINGS: Acute displaced left intertrochanteric fracture with varus angulation. No dislocation of the hip. Soft tissues are unremarkable. Acute displaced left intertrochanteric fracture with varus angulation. XR FEMUR LEFT (MIN 2 VIEWS)    Result Date: 5/14/2023  EXAMINATION: 2 XRAY VIEWS OF THE LEFT FEMUR 5/14/2023 1:36 pm COMPARISON: 5/13/2023 HISTORY: Postoperative. FINDINGS: Status post intramedullary nail placement within the left femur. Hardware is well aligned. No acute complication seen. Routine perioperative changes noted including soft tissue swelling and subcutaneous air. Intramedullary nail placement within the left femur. XR FEMUR LEFT (MIN 2 VIEWS)    Result Date: 5/14/2023  EXAMINATION: 11 XRAY VIEWS OF THE LEFT FEMUR; SPOT FLUOROSCOPIC IMAGES 5/14/2023 11:10 am COMPARISON: 05/13/2023 HISTORY: ORDERING SYSTEM PROVIDED HISTORY: ORIF TECHNOLOGIST PROVIDED HISTORY: Reason for exam:->ORIF FINDINGS: Multiple fluoroscopic images of the left femur were obtained intraoperatively status post femoral intramedullary darinel and dynamic pin transfixing an acute intertrochanteric fracture. The air Kerma is 19.2 mGy. 1. See above. XR KNEE LEFT (1-2 VIEWS)    Result Date: 5/13/2023  EXAMINATION: 2 XRAY VIEWS OF THE LEFT KNEE 5/13/2023 7:58 pm COMPARISON: None.  HISTORY: ORDERING SYSTEM PROVIDED HISTORY: surgical planning TECHNOLOGIST PROVIDED HISTORY: Reason for exam:->surgical planning Reason for Exam: fall, surgical planning FINDINGS: The bones appear demineralized without radiographic evidence of acute fracture

## 2023-05-17 NOTE — CARE COORDINATION
Spoke with RN who states patient could potentially be ready for discharge today. Spoke with patient and son and daughter to discuss the above and follow up on placement decision. They states they would like for patient to discharge to [facility] as patient's sister is there and it is close to family. Placed call to Scot with [facility] and notified of referral.  She states they are able to accept. Patient will need ambulance transport. Ambulance transport arranged in the event of discharge. Scheduled to be picked up by [facility] at 3pm.           CASE MANAGEMENT DISCHARGE SUMMARY      Discharge to: [facility] completed: [address] Exemption Notification (HENS) completed: n/a    Transportation: ambulance    Medical Transport explained to pt/family. Pt/family voice no agency preference. Agency used:[facility]    time:3pm   Ambulance form completed: Yes    Confirmed discharge plan with:patient/son/RN/Komal with [facility]     Patient: yes     Family:  yes    Name:Gt Contact number:[phone]     Facility/Agency, name:  [facility] faxed   Phone number for report to facility: [phone]     RN, name: Garrett Herrera    Note: Discharging nurse to complete [facility], reconcile [facility], and place final copy with patient's discharge packet. RN to ensure that written prescriptions for  Level II medications are sent with patient to the facility as per protocol.

## 2023-05-17 NOTE — PLAN OF CARE
Problem: Discharge Planning  Goal: Discharge to home or other facility with appropriate resources  Outcome: Progressing     Problem: Pain  Goal: Verbalizes/displays adequate comfort level or baseline comfort level  Outcome: Progressing   Pt used pain rating scale 0-10 to rate pain and for reassessment. Problem: Skin/Tissue Integrity  Goal: Absence of new skin breakdown  Description: 1. Monitor for areas of redness and/or skin breakdown  2. Assess vascular access sites hourly  3. Every 4-6 hours minimum:  Change oxygen saturation probe site  4. Every 4-6 hours:  If on nasal continuous positive airway pressure, respiratory therapy assess nares and determine need for appliance change or resting period.   Outcome: Progressing     Problem: ABCDS Injury Assessment  Goal: Absence of physical injury  Outcome: Progressing     Problem: Safety - Adult  Goal: Free from fall injury  Outcome: Progressing

## 2023-05-17 NOTE — PROGRESS NOTES
05/14/23 1623   NIV Type   NIV Started/Stopped On   Equipment Type v60   Mode Bilevel   Mask Type Full face mask   Mask Size Medium   Settings/Measurements   IPAP 16 cmH20   CPAP/EPAP 6 cmH2O   Vt (Measured) 461 mL   Rate Ordered 18   Respirations 30   FiO2  (S)  30 %   Comfort Level Fair   SpO2 98   Patient's Home Machine No   Alarm Settings   Alarms On Y   Low Pressure (cmH2O) 6 cmH2O   High Pressure (cmH2O) 30 cmH2O   Patient Observation   Observations mepilex on nose
05/15/23 1512   Encounter Summary   Encounter Overview/Reason  Spiritual/Emotional Needs   Service Provided For: Patient   Referral/Consult From: Nurse   Support System Children;Family members   Last Encounter  05/15/23  (emotional support, help Pt process/validate feelings re:family dynamics)   Complexity of Encounter Moderate   Begin Time 1430   End Time  1510   Total Time Calculated 40 min   Spiritual/Emotional needs   Type Spiritual Support;Emotional Distress        Deanna Gutierrez
Attempted to call back haris Campbell, no answer & no voicemail to leave message, will make day shift aware.
[address]     Electrophysiology                                     Progress Note    Admission date:  2023    Reason for follow up visit: AF    HPI/CC: Aicha Martínez was admitted on 2023 with a left femur fracture after a mechanical fall. She underwent ORIF. Post operatively, she had atrial fibrillation and EP was consulted. Eliquis and amiodarone were started. She has maintained sinus rhythm since around 1900 on 5/15/2023. Subjective: She is feeling better today. Denies chest pain, palpitations, shortness of breath, and dizziness. Vitals:  Blood pressure 133/77, pulse 85, temperature 98.4 °F (36.9 °C), temperature source Oral, resp. rate 16, height 5' 4\" (1.626 m), weight 141 lb 15.6 oz (64.4 kg), SpO2 97 %.   Temp  Av.3 °F (36.8 °C)  Min: 98.1 °F (36.7 °C)  Max: 98.6 °F (37 °C)  Pulse  Av  Min: 74  Max: 86  BP  Min: 103/63  Max: 140/83  SpO2  Av %  Min: 96 %  Max: 98 %    24 hour I/O    Intake/Output Summary (Last 24 hours) at 2023 1359  Last data filed at 2023 0447  Gross per 24 hour   Intake 480 ml   Output --   Net 480 ml       Current Facility-Administered Medications   Medication Dose Route Frequency Provider Last Rate Last Admin    methocarbamol (ROBAXIN) tablet 500 mg  500 mg Oral TID DARIAN Corrales   500 mg at 23 1154    docusate sodium (COLACE) capsule 100 mg  100 mg Oral BID DEBBIE Traylor - CNP   100 mg at 23 0818    oxyCODONE (ROXICODONE) immediate release tablet 5 mg  5 mg Oral Q4H PRN DARIAN Corrales        Or    oxyCODONE (ROXICODONE) immediate release tablet 10 mg  10 mg Oral Q4H PRN DARIAN Corrales   10 mg at 23 0820    acetaminophen (TYLENOL) tablet 650 mg  650 mg Oral Q6H Nevaeh Corrales   650 mg at 23 1154    polyethylene glycol (GLYCOLAX) packet 17 g  17 g Oral Daily Nevaeh Corrales   17 g at 23 0818    propranolol (INDERAL) tablet 10 mg  10 mg Oral BID DEBBIE Traylor -
Department of Orthopedic Surgery  Physician Assistant   Progress Note    Subjective:       Systemic or Specific Complaints: No complaints, was able to work with therapy today, still had some pain with therapy but felt it was improved from yesterday. Pt notes she felt a pop in the hip area during therapy but denies any increase in pain, and was still able to bear weight on the effected leg. Objective:     Patient Vitals for the past 24 hrs:   BP Temp Temp src Pulse Resp SpO2 Weight   05/17/23 0816 127/72 98.2 °F (36.8 °C) Oral 78 15 98 % --   05/17/23 0442 -- -- -- -- -- -- 141 lb 15.6 oz (64.4 kg)   05/17/23 0430 (!) 140/83 98.1 °F (36.7 °C) Oral 82 16 98 % --   05/17/23 0017 103/63 98.6 °F (37 °C) Oral 74 16 96 % --   05/16/23 2015 112/71 98.4 °F (36.9 °C) -- 86 16 96 % --       General: alert, appears stated age, cooperative, and no distress   Wound: Wound clean and dry no evidence of infection. , No Erythema, No Drainage, and Positive for Edema   Motion: Painful range of Motion in affected extremity   DVT Exam: No evidence of DVT seen on physical exam.  No cords or calf tenderness. No significant calf/ankle edema. Additional exam: Pt seen sitting up in recliner at time of exam  Surgical dressing in place, clean, dry, intact. Compartments soft and compressible. EHL/FHL/TA/GS complex motor intact. Sural, saphenous, superificial/deep peroneal, and plantar nerve distribution sensation grossly intact. Dorsalis pedis/posterior tibial pulses 2+  Able to fully extend the knee, flex to 100 degrees.   Pain with active assisted SLR in seated position  No pain with log roll        Data Review  CBC:   Lab Results   Component Value Date/Time    WBC 6.1 05/16/2023 05:18 AM    RBC 2.80 05/16/2023 05:18 AM    HGB 9.2 05/17/2023 05:10 AM    HCT 27.1 05/17/2023 05:10 AM     05/16/2023 05:18 AM       Renal:   Lab Results   Component Value Date/Time     05/17/2023 05:10 AM    K 3.7 05/17/2023 05:10 AM
Department of Orthopedic Surgery  Physician Assistant   Progress Note    Subjective:       Systemic or Specific Complaints:No Complaints and pain is well controlled. Patient was able to work with physical therapy today to get into the recliner, has been in the chair for a long time and is requesting to get back into bed. No family at bedside. Patient was assisted back to bed by myself and the RN using the Amy Bares steady. There was pain with change of position but overall the patient tolerated well    Objective:     Patient Vitals for the past 24 hrs:   BP Temp Temp src Pulse Resp SpO2   05/16/23 2015 112/71 98.4 °F (36.9 °C) -- 86 16 96 %   05/16/23 0801 117/77 -- -- (!) 101 -- 100 %   05/16/23 0748 119/75 97.7 °F (36.5 °C) Oral 80 14 98 %   05/16/23 0300 103/67 98.2 °F (36.8 °C) Oral 78 18 93 %   05/15/23 2358 -- -- -- -- 18 --   05/15/23 2328 -- -- -- -- 18 --   05/15/23 2315 103/63 98.4 °F (36.9 °C) Oral 86 18 93 %   05/15/23 2130 111/63 -- -- 93 18 94 %       General: alert, appears stated age, cooperative, and no distress   Wound: Wound clean and dry no evidence of infection. , No Erythema, No Drainage, and Positive for Edema   Motion: Painful range of Motion in affected extremity   DVT Exam: No evidence of DVT seen on physical exam.  No cords or calf tenderness. No significant calf/ankle edema. Additional exam:Surgical dressing in place, clean, dry, intact. Compartments soft and compressible. EHL/FHL/TA/GS complex motor intact. Sural, saphenous, superificial/deep peroneal, and plantar nerve distribution sensation grossly intact.    Dorsalis pedis/posterior tibial pulses 2+      Data Review  CBC:   Lab Results   Component Value Date/Time    WBC 6.1 05/16/2023 05:18 AM    RBC 2.80 05/16/2023 05:18 AM    HGB 8.9 05/16/2023 05:18 AM    HCT 26.7 05/16/2023 05:18 AM     05/16/2023 05:18 AM       Renal:   Lab Results   Component Value Date/Time     05/16/2023 05:18 AM    K 4.1 05/16/2023
Hospital Medicine Progress Note      Date of Admission: 5/13/2023  Hospital Day: 3    Chief Admission Complaint:  fall, hip fx     Subjective:  pain controlled, no voiced complaints, gen weak, updated POA. Brief episode of rapid irreg HR-    Presenting Admission History:       80y.o. year-old female with a history of hyperlipidemia and osteoporosis. She presents to the ER for evaluation of left hip pain s/p mechanical fall. She states that she bent over to pick something over when she fell forward landing on her left side. She immediately felt severe, sharp/burning pain in her left hip which was worse with any movement and improved with rest. On evaluation in the ER she was found to have an intertrochanteric fracture of the left hip. Pt denies head trauma, and associated signs and symptoms do not include dizziness or lightheadedness, chest pain, shortness of breath or neurological symptoms prior to the fall. Patient denies neck pain or stiffness, diplopia or other vision changes, difficulty swallowing, numbness in the arms or legs, or focal neurological deficits. She is being admitted for further evaluation and treatment. Assessment/Plan:      Intertrochanteric fracture of left hip, closed, initial encounter ([facility] [address]- secondary to a mechanical fall. Pt has 2+ or better peripheral pulses and no signs of ischemia distal to the fracture. Orthopedic Surgery has been consulted, and I anticipate need for surgery. Pt has been made NPO,  OR today 5/14 due to Intertrochanteric fracture of left hip, closed. Patient POA at bedside. VSS, cxr-stable, ecg:sinus rhythm with premature atrial complexes nonspecific ST and T wave abnormality. Pt's perioperative risk of MI is 0.24% per Walker Messing scale. Patient is moderate risk for operative procedure and is hemodynamically stable to proceed.   Left hip pain - IV Morphine and/or Oxycodone immediate release will be provided prn pain  -POD #1  -Supplemental oxygen    PAF-Cardiology
Occupational Therapy  Facility/Department: [facility] A2 CARD TELEMETRY  Daily Treatment Note  NAME: Tolu Crain  : 1935  MRN: [id number]    Date of Service: 2023    Discharge Recommendations:  [address]       Therapy discharge recommendations take into account each patient's current medical complexities and are subject to input/oversight from the patient's healthcare team.      Barriers to Home Discharge:   [] Steps to access home entry or bed/bath:   [x] Unable to transfer, ambulate, or propel wheelchair household distances without assist   [x] Limited available assist at home upon discharge    [x] Patient or family requests d/c to post-acute facility    [] Poor cognition/safety awareness for d/c to home alone    [] Unable to maintain ordered weight bearing status    [] Patient with salient signs of long-standing immobility   [x] Decreased independence with ADLs   [x] Increased risk for falls      AM-PAC score  AM-PAC Inpatient Daily Activity Raw Score: 12 (23)  AM-PAC Inpatient ADL T-Scale Score : 30.6 (23)  ADL Inpatient CMS 0-100% Score: 66.57 (23 0856)  ADL Inpatient CMS G-Code Modifier : CL (23 9759)      Patient Diagnosis(es): The primary encounter diagnosis was Closed nondisplaced intertrochanteric fracture of left femur, initial encounter ([facility] [address] A diagnosis of Fall, initial encounter was also pertinent to this visit. Assessment    Assessment: Pt demos improved tolerance of OT treatment, requires frequent encouragement and cues to initiate breathing technique due to pain and high anxiety while seated EOB. Pt requiring max A of 2 for all bed mobility and transfer training with use of [place]ty Lawman. Pt total A for LB ADLs. Co-tx collaboration this date with PT staff to safely progress pt toward goals. Pt will have better occupational performance outcomes within a co-treatment than 1:1 session. Continue to recommend SNF at d/c.   Activity
Occupational Therapy  Facility/Department: [facility] A2 CARD TELEMETRY  Occupational Therapy Initial Assessment and Treatment Note    Name: Soni Bustamante  : 1935  MRN: [id number]  Date of Service: 5/15/2023    Discharge Recommendations:  [address]     Therapy discharge recommendations take into account each patient's current medical complexities and are subject to input/oversight from the patient's healthcare team.     Barriers to Home Discharge:   [] Steps to access home entry or bed/bath:   [x] Unable to transfer, ambulate, or propel wheelchair household distances without assist   [x] Limited available assist at home upon discharge    [x] Patient or family requests d/c to post-acute facility    [] Poor cognition/safety awareness for d/c to home alone    [] Unable to maintain ordered weight bearing status    [] Patient with salient signs of long-standing immobility   [x] Decreased independence with ADLs   [x] Increased risk for falls   [] Other:  If pt is unable to be seen after this session, please let this note serve as discharge summary. Please see case management note for discharge disposition. Thank you. Patient Diagnosis(es): The primary encounter diagnosis was Closed nondisplaced intertrochanteric fracture of left femur, initial encounter ([facility] [address] A diagnosis of Fall, initial encounter was also pertinent to this visit. Past Medical History:  has a past medical history of Arthritis, Cataract, H/O colonoscopy, Hyperlipidemia, and Osteoporosis. Past Surgical History:  has a past surgical history that includes Cataract removal and Colonoscopy. Assessment   Performance deficits / Impairments: Decreased functional mobility ; Decreased ADL status; Decreased cognition;Decreased safe awareness;Decreased balance;Decreased coordination;Decreased posture;Decreased endurance;Decreased strength  Assessment: OT eval and tx completed after admission for L IT fx.  Pt is s/p L femur ORIF,
Occupational Therapy  Facility/Department: [facility] A2 CARD TELEMETRY  Daily Treatment Note  NAME: Jayla Block  : 1935  MRN: [id number]    Date of Service: 2023    Discharge Recommendations:  Subacute/Skilled Nursing Facility       Therapy discharge recommendations take into account each patient's current medical complexities and are subject to input/oversight from the patient's healthcare team.      Barriers to Home Discharge:   [] Steps to access home entry or bed/bath   [x] Unable to transfer, ambulate, or propel wheelchair household distances without assist   [x] Limited available assist at home upon discharge    [x] Patient or family requests d/c to post-acute facility    [] Poor cognition/safety awareness for d/c to home alone    [] Unable to maintain ordered weight bearing status    [] Patient with salient signs of long-standing immobility   [x] Decreased independence with ADLs   [x] Increased risk for falls      AM-PAC score  AM-PAC Inpatient Daily Activity Raw Score: 13 (23 122)  AM-PAC Inpatient ADL T-Scale Score : 32.03 (23)  ADL Inpatient CMS 0-100% Score: 63.03 (23)  ADL Inpatient CMS G-Code Modifier : CL (23)        Patient Diagnosis(es): The primary encounter diagnosis was Closed nondisplaced intertrochanteric fracture of left femur, initial encounter ([facility] [address] A diagnosis of Fall, initial encounter was also pertinent to this visit. Assessment    Assessment: Pt continues to make good progress toward OT goals, less anxiety with movement and decreased c/o pain with mobility. Pt requiring Ax2 for all bed mobility and transfer training using Tee David. Pt total A for LB ADLs. Co-tx collaboration this date with PT staff to safely progress pt toward goals. Pt will have better occupational performance outcomes within a co-treatment than 1:1 session. Pt continues to function below her baseline, would benefit from SNF at d/c.   Activity Tolerance: Patient
Patient transferred to room 105 with all belongings. [address] [place] [address] notified.
Physical Therapy  Facility/Department: [facility] A2 CARD TELEMETRY  Daily Treatment Note  NAME: Rainer Monzon  : 1935  MRN: [id number]    Date of Service: 2023    Discharge Recommendations:  Subacute/Skilled Nursing Facility   PT Equipment Recommendations  Equipment Needed: No  Other: defer    AM-PAC Basic Mobility - Inpatient   How much help is needed turning from your back to your side while in a flat bed without using bedrails?: A Lot  How much help is needed moving from lying on your back to sitting on the side of a flat bed without using bedrails?: Total  How much help is needed moving to and from a bed to a chair?: Total  How much help is needed standing up from a chair using your arms?: Total  How much help is needed walking in hospital room?: Total  How much help is needed climbing 3-5 steps with a railing?: Total  AM-PAC Inpatient Mobility Raw Score : 7  AM-PAC Inpatient T-Scale Score : 26.42  Mobility Inpatient CMS 0-100% Score: 92.36  Mobility Inpatient CMS G-Code Modifier : CM     Patient Diagnosis(es): The primary encounter diagnosis was Closed nondisplaced intertrochanteric fracture of left femur, initial encounter ([facility] [address] A diagnosis of Fall, initial encounter was also pertinent to this visit. Assessment   Assessment: Pt demos improved tolerance of PT treatment, requires frequent encouragement and cues to initiate breathing technique due to pain and high anxiety while seated EOB. Pt requiring max A of 2 for all bed mobility and transfer training with use of Ronalee Flower. Pt performed exs seated and supine with rests due to pain and anxiety with movement. Co-tx collaboration this date with OT staff to safely progress pt toward goals. Pt will have better occupational performance outcomes within a co-treatment than 1:1 session. Continue to recommend SNF at d/c.     Activity Tolerance: Patient tolerated treatment well;Patient limited by pain  Equipment Needed: No  Other: defer     Plan    Physcial
Physical Therapy  Facility/Department: [facility] A2 CARD TELEMETRY  Physical Therapy Initial Assessment    Name: Emmanuel Jackson  : 1935  MRN: [id number]  Date of Service: 5/15/2023    Discharge Recommendations:  Subacute/Skilled Nursing Facility   PT Equipment Recommendations  Equipment Needed: No  Other: defer      Patient Diagnosis(es): The primary encounter diagnosis was Closed nondisplaced intertrochanteric fracture of left femur, initial encounter ([facility] [address] A diagnosis of Fall, initial encounter was also pertinent to this visit. Past Medical History:  has a past medical history of Arthritis, Cataract, H/O colonoscopy, Hyperlipidemia, and Osteoporosis. Past Surgical History:  has a past surgical history that includes Cataract removal and Colonoscopy. Therapy discharge recommendations take into account each patient's current medical complexities and are subject to input/oversight from the patient's healthcare team.   Barriers to Home Discharge:   [x] Steps to access home entry or bed/bath:   [x] Unable to transfer, ambulate, or propel wheelchair household distances without assist   [x] Limited available assist at home upon discharge    [] Patient or family requests d/c to post-acute facility    [x] Poor cognition/safety awareness for d/c to home alone   [] Unable to maintain ordered weight bearing status    [] Patient with salient signs of long-standing immobility   [x] Other: high fall risk    If pt is unable to be seen after this session, please let this note serve as discharge summary. Please see case management note for discharge disposition. Thank you. Assessment   Body Structures, Functions, Activity Limitations Requiring Skilled Therapeutic Intervention: Decreased functional mobility ; Decreased cognition;Decreased coordination;Decreased endurance; Increased pain;Decreased balance;Decreased strength;Decreased safe awareness  Assessment: Pt is 79 yo female who presents s/p L hip IMN/ORIF  after
Physical Therapy  Facility/Department: [facility] A2 CARD TELEMETRY  Daily Treatment Note  NAME: Diana Oneil  : 1935  MRN: [id number]    Date of Service: 2023    Discharge Recommendations:  Subacute/Skilled Nursing Facility   PT Equipment Recommendations  Equipment Needed: No  Other: defer    Therapy discharge recommendations take into account each patient's current medical complexities and are subject to input/oversight from the patient's healthcare team.     Barriers to Home Discharge:   [] Steps to access home entry or bed/bath:   [x] Unable to transfer, ambulate, or propel wheelchair household distances without assist   [x] Limited available assist at home upon discharge    [] Patient or family requests d/c to post-acute facility    [] Poor cognition/safety awareness for d/c to home alone    [] Unable to maintain ordered weight bearing status    [] Patient with salient signs of long-standing immobility   [] Decreased independence with ADLs   [x] Increased risk for falls   [] Other:    If pt is unable to be seen after this session, please let this note serve as discharge summary. Please see case management note for discharge disposition. Thank you. Patient Diagnosis(es): The primary encounter diagnosis was Closed nondisplaced intertrochanteric fracture of left femur, initial encounter ([facility] [address] A diagnosis of Fall, initial encounter was also pertinent to this visit. Assessment   Assessment: pt seen as cotx with OT to progress functional mobility safely and maximize outcomes. Pt demo improvement with STS and standing tolerance this date. Pt continues to require Ax2 for safe transfers with use of Judyann Daquan. Pt would benefit from continued skilled PT to address current deficits.  Recommend SNF upon d/c  Activity Tolerance: Patient tolerated treatment well  Equipment Needed: No  Other: defer     Plan    Physcial Therapy Plan  General Plan: 1 time a day 7 days a week  Specific Instructions for Next
Physician Progress Note      Mauricio Alfonso  [facility] #:                  [id number]  :                       1935  ADMIT DATE:       2023 7:06 PM  [address] [place] DATE:  RESPONDING  PROVIDER #:        FELICE PONCE          QUERY TEXT:    Pt admitted with left femur fracture. Pt noted to have osteoporosis. If   possible, please document in progress notes and discharge summary if you are   evaluating and/or treating any of the following: The medical record reflects the following:  Risk Factors: Advanced age, respiratory acidosis  Clinical Indicators: Per progress notes \"Osteoporosis of multiple sites -   Check Vitamin D level. Continue Calcium supplements\". Per op note \"recommended   to evaluate for osteoporosis given the fragility fracture they sustained\". Vit D 23.6  Treatment: ORIF, calcium supplements, serial labs, supportive care    Thank you,  Aidan Levine RN BSN  Options provided:  -- Osteoporotic femur fracture following fall which would not usually break a   normal, healthy bone  -- Other - I will add my own diagnosis  -- Disagree - Not applicable / Not valid  -- Disagree - Clinically unable to determine / Unknown  -- Refer to Clinical Documentation Reviewer    PROVIDER RESPONSE TEXT:    This patient has an osteoporotic femur fracture following fall which would not   break a normal, healthy bone. Query created by: Nathalie Vargas on 5/15/2023 1:23 PM      QUERY TEXT:    Pt admitted with hip fracture and underwent ORIF. If possible, please   document in the progress notes and discharge summary if you are evaluating   and/or treating any of the following: The medical record reflects the following:  Risk Factors: Advanced age, hip fracture  Clinical Indicators: Per progress note 5/15 \"Post procedural hypoxemia, Acute   metabolic encephalopathy secondary to CO2 retention-was on Bipap , now off   -currently on room air (resolved)\".  Pulse ox 89%, RR up to 30, respiratory
Progress Note    Patient:  Tonya Castro  YOB: 1935     80 y.o. female    Subjective:  Patient seen and examined  Patient monitored overnight, intermittent afib noted, but pain and mentation improving. Answering questions appropriately, fully AOx3 today    Objective:   Vitals:    05/15/23 1130   BP: (!) 120/55   Pulse:    Resp:    Temp: 98.4 °F (36.9 °C)   SpO2:      Gen: NAD, cooperative   Left lower extremity: Surgical dressing in place, clean, dry, intact. Compartments soft. EHL/FHL/TA/GS complex motor intact. Sural, saphenous, superificial/deep peroneal, and plantar nerve distribution sensation grossly intact. Dorsalis pedis/posterior tibial pulses 2+ with BCR.       Recent Labs     05/15/23  0533 05/15/23  0823   WBC  --  7.5   HGB  --  10.2*   HCT  --  30.9*   PLT  --  126*     --    K 3.9  --    BUN 14  --    CREATININE 0.6  --    GLUCOSE 109*  --         Meds: aspirin, lovenox   See rec for complete list    Impression/plan: 80 y.o. female s/p L hip IMN/ORIF, POD#1 (DOS: 5/14/23)    -WB status: weight bearing as tolerated to operative extremity  -Pain control oral and IV wean to oral only  -DVT ppx: Lovenox x 6 weeks  -Finish post operative antibiotics  -Ice/Elevate for pain and swelling  -Incentive Spirometry use  -PT/OT eval with social work evaluation for placement  -Follow up with me in 10-14 days for wound check and staple removal.  -Please call with any questions    Arlene Woodward DO  Orthopedic Surgery and Sports Medicine  11:55 AM 5/15/2023
Pt becoming increasingly more anxious throughout night. Per pt request writer called to update dtr James Minor.
Pt ok for discharge per MD. Discharge instructions and script given to transport. IV removed. Telemetry monitor removed and CMU notified. No questions or concerns at this time. Transported via stretcher by [facility] to [facility]. Report called to [facility].
risk factors:Left hip repair, monitoring cbc-abla  ----------------------------------------------------------------------  C. Data (any 2)  [x] Discussed management of the case with:  CM  [] Imaging personally reviewed and interpreted, includes:   [x] Data Review (any 3)  [x] Collateral history obtained from:family, chart review  [x] All available Consultant notes from yesterday/today were reviewed:   [x] All current labs were personally reviewed and interpreted for clinical significance   [x] Appropriate follow-up labs were ordered    Medications:  Personally reviewed in detail in conjunction w/ labs as documented for evidence of drug toxicity. Infusion Medications    sodium chloride       Scheduled Medications    propranolol  10 mg Oral BID    apixaban  2.5 mg Oral BID    amiodarone  400 mg Oral BID    citalopram  20 mg Oral QHS    aspirin  81 mg Oral Daily    sodium chloride flush  10 mL IntraVENous 2 times per day    calcium elemental  1,000 mg Oral Daily     PRN Meds: perflutren lipid microspheres, cyclobenzaprine, morphine **OR** morphine, sodium chloride flush, sodium chloride, ondansetron, polyethylene glycol, acetaminophen **OR** acetaminophen, morphine **OR** morphine, oxyCODONE, diphenhydrAMINE     Labs:  Personally reviewed and interpreted for clinical significance. Recent Labs     05/14/23  0544 05/15/23  0823 05/16/23  0518   WBC 7.9 7.5 6.1   HGB 13.2 10.2* 8.9*   HCT 41.3 30.9* 26.7*    126* 120*       Recent Labs     05/14/23 0544 05/15/23  0533 05/16/23  0518    139 136   K 4.3 3.9 4.1    106 103   CO2 24 24 27   BUN 14 14 14   CREATININE 0.7 0.6 <0.5*   CALCIUM 8.5 7.7* 8.1*   MG  --  1.90  --        No results for input(s): PROBNP, TROPHS in the last 72 hours. Recent Labs     05/15/23  0823   LABA1C 5.5     No results for input(s): AST, ALT, BILIDIR, BILITOT, ALKPHOS in the last 72 hours. No results for input(s): INR, LACTA, TSH in the last 72 hours.     Urine
05/16/2023 05:18 AM     BNP:  No results found for: BNP  Fasting Lipid Panel:    Lab Results   Component Value Date/Time    CHOL 172 10/30/2018 08:48 AM    HDL 75 10/30/2018 08:48 AM    HDL 77 02/28/2012 08:57 AM    TRIG 94 10/30/2018 08:48 AM     Cardiac Enzymes:  CK/MbTroponin  Lab Results   Component Value Date/Time    CKTOTAL 138 09/10/2020 12:10 PM    TROPONINI <0.01 04/22/2019 11:22 AM     PT/ INR   Lab Results   Component Value Date/Time    INR 1.04 04/22/2019 11:22 AM    PROTIME 11.9 04/22/2019 11:22 AM     PTT No results found for: PTT   Lab Results   Component Value Date/Time    MG 1.90 05/15/2023 05:33 AM      Lab Results   Component Value Date/Time    TSH 3.40 03/29/2017 09:02 AM       Assessment:  Paroxysmal atrial arrhythmias (AF/AT): stable   -noted post op ORIF    -[id number] score 3 (age, gender)  HLD  Mechanical fall with left hip fracture    -s/p ORIF and nailing 5/2023  Anemia  Thrombocytopenia   Severe pulmonary HTN   Mild to moderate MR  Moderate AI/TR   Grade I DD  Reduced RV function       Plan:   1. Continue Eliquis (reduced dose for age and weight)   2. Continue amiodarone. Will discharge on 200 mg PO daily. 3. Continue propanolol  4.  Monitor on telemetry while inpatient       [facility], APRN-CNP  [facility]  [phone]

## 2023-05-18 ENCOUNTER — TELEPHONE (OUTPATIENT)
Dept: INTERNAL MEDICINE CLINIC | Age: 88
End: 2023-05-18

## 2023-06-05 ENCOUNTER — OFFICE VISIT (OUTPATIENT)
Dept: ORTHOPEDIC SURGERY | Age: 88
End: 2023-06-05

## 2023-06-05 DIAGNOSIS — M25.552 LEFT HIP PAIN: Primary | ICD-10-CM

## 2023-06-05 DIAGNOSIS — S72.002A CLOSED FRACTURE OF LEFT HIP, INITIAL ENCOUNTER (HCC): ICD-10-CM

## 2023-06-05 NOTE — PROGRESS NOTES
Chief Complaint  Hip Pain (PO Lt hip/)      History of Present Illness:  Diana Oneil is a pleasant 80 y.o. female here today for her first postop evaluation regarding her left hip. She sustained a left hip fracture that underwent intramedullary nailing on 5/14/2023. The patient is doing okay. She has an expected amount of postoperative pain. She denies any numbness and tingling down the leg. She denies any fevers or chills. Medical History:  Patient's medications, allergies, past medical, surgical, social and family histories were reviewed and updated as appropriate. Pertinent items are noted in HPI  Review of systems reviewed from Patient History Form dated on 6/5/23 and available in the patient's chart under the Media tab. Vital Signs: There were no vitals filed for this visit. Constitutional: In no apparent distress. Normal affect. Alert and oriented X3 and is cooperative. L Hip Examination:    Well-healed surgical incisions about the hip. Appropriately tender palpation to the lateral aspect of the hip. Minimal tenderness to the knees. L4-S1 distribution intact without paresthesias. 5 out of 5 plantarflexion and dorsiflexion. Some discomfort with logroll patient able to stand with only slight help. .      Radiology:       2 views of the left femur taken in the office today demonstrate a stable intertrochanteric femur fracture without hardware complication. Assessment : 80-year-old female 2 weeks status post left hip intramedullary nailing, date procedure 5/14/2023    Impression:  Encounter Diagnoses   Name Primary? Left hip pain Yes    Closed fracture of left hip, initial encounter ([facility] [address]        Office Procedures:  Orders Placed This Encounter   Procedures    XR HIP LEFT (2-3 VIEWS)     Standing Status:   Future     Number of Occurrences:   1     Standing Expiration Date:   6/5/2024         Plan:     Continue weightbearing as tolerated.   Continue physical therapy

## 2023-07-03 ENCOUNTER — OFFICE VISIT (OUTPATIENT)
Dept: ORTHOPEDIC SURGERY | Age: 88
End: 2023-07-03

## 2023-07-03 VITALS — BODY MASS INDEX: 24.07 KG/M2 | WEIGHT: 141 LBS | HEIGHT: 64 IN

## 2023-07-03 DIAGNOSIS — S72.002D CLOSED FRACTURE OF LEFT HIP WITH ROUTINE HEALING, SUBSEQUENT ENCOUNTER: Primary | ICD-10-CM

## 2023-07-03 DIAGNOSIS — M25.552 LEFT HIP PAIN: ICD-10-CM

## 2023-07-03 PROCEDURE — 99024 POSTOP FOLLOW-UP VISIT: CPT | Performed by: STUDENT IN AN ORGANIZED HEALTH CARE EDUCATION/TRAINING PROGRAM

## 2023-07-03 NOTE — PROGRESS NOTES
Chief Complaint  Hip Pain (FU LT hip)      History of Present Illness: The patient is here for repeat evaluation regarding her left hip. The patient is 6 weeks out from her left hip intramedullary nailing. The patient is still at the [facility] assisted living facility. She is doing aggressive rehab. She is walking with a walker. Prior HPI 6/5/2023:  Harry Lin is a pleasant 80 y.o. female here today for her first postop evaluation regarding her left hip. She sustained a left hip fracture that underwent intramedullary nailing on 5/14/2023. The patient is doing okay. She has an expected amount of postoperative pain. She denies any numbness and tingling down the leg. She denies any fevers or chills. Medical History:  Patient's medications, allergies, past medical, surgical, social and family histories were reviewed and updated as appropriate. Pertinent items are noted in HPI  Review of systems reviewed from Patient History Form dated on 7/3/23 and available in the patient's chart under the Media tab. Vital Signs: There were no vitals filed for this visit. Constitutional: In no apparent distress. Normal affect. Alert and oriented X3 and is cooperative. L Hip Examination:    Well-healed surgical incisions about the hip. Minimally tender to palpation to the lateral aspect of the hip. Minimal tenderness to the knees. L4-S1 distribution intact without paresthesias. 5 out of 5 plantarflexion and dorsiflexion. Some discomfort with logroll patient able to stand with only slight help. .      Radiology:       2 views of the left femur taken in the office today demonstrate a stable intertrochanteric femur fracture without hardware complication and interval bony healing. Assessment : 71-year-old female 6 weeks status post left hip intramedullary nailing, date procedure 5/14/2023    Impression:  Encounter Diagnoses   Name Primary?     Left hip pain     Closed fracture of left hip

## 2023-07-19 ENCOUNTER — OFFICE VISIT (OUTPATIENT)
Dept: INTERNAL MEDICINE CLINIC | Age: 88
End: 2023-07-19

## 2023-07-19 VITALS
SYSTOLIC BLOOD PRESSURE: 128 MMHG | WEIGHT: 125.8 LBS | BODY MASS INDEX: 21.59 KG/M2 | HEART RATE: 69 BPM | OXYGEN SATURATION: 94 % | DIASTOLIC BLOOD PRESSURE: 72 MMHG | TEMPERATURE: 97.3 F

## 2023-07-19 DIAGNOSIS — I48.0 PAROXYSMAL ATRIAL FIBRILLATION (HCC): ICD-10-CM

## 2023-07-19 DIAGNOSIS — R25.1 TREMOR: Primary | ICD-10-CM

## 2023-07-19 DIAGNOSIS — E78.2 MIXED HYPERLIPIDEMIA: ICD-10-CM

## 2023-07-19 DIAGNOSIS — H35.30 MACULAR DEGENERATION, UNSPECIFIED LATERALITY, UNSPECIFIED TYPE: ICD-10-CM

## 2023-07-19 DIAGNOSIS — G47.00 INSOMNIA, UNSPECIFIED TYPE: ICD-10-CM

## 2023-07-19 DIAGNOSIS — F33.0 MILD EPISODE OF RECURRENT MAJOR DEPRESSIVE DISORDER (HCC): ICD-10-CM

## 2023-07-19 NOTE — PATIENT INSTRUCTIONS
We will get you set up with [facility] hopefully through [facility] will reach out to you in regards to transportation    Take Melatonin 3mg at bedtime or 30-60 minutes before bed, if this works well, please call me so I can add a prescription to [address] [place] [address].

## 2023-07-20 ENCOUNTER — TELEPHONE (OUTPATIENT)
Dept: INTERNAL MEDICINE CLINIC | Age: 88
End: 2023-07-20

## 2023-07-20 NOTE — TELEPHONE ENCOUNTER
Donna Mclean, Patient is now living independent living at [facility]. She needs some help with transportation - Do you know if there is someone at the [employer] [facility] to do this or if there is a company that we can give her resources to? Maty Neri, can you call [facility] and find out how long it would be to have patient start Pill Packs and delivered to new address at [facility]?

## 2023-07-20 NOTE — TELEPHONE ENCOUNTER
Spoke with [facility] [phone]. The person who starts up the pill packs is Mariela Bella. She will be in on Monday. Mariela Bella would need: The patient to bring in all of their medications except one weeks worth to keep from themself. Mariela Bella also needs       credit card for payment  2.  medication list from our office. 3. insurance information. After this the process of getting set up takes about one week. They do deliver to [facility] in [facility].

## 2023-07-21 ENCOUNTER — CARE COORDINATION (OUTPATIENT)
Dept: CARE COORDINATION | Age: 88
End: 2023-07-21

## 2023-07-21 NOTE — CARE COORDINATION
Follow up call made to Mikal Cabrera regarding Emir Dejesus. She is currently living at the [facility] for independent living. I have updated him to the [employer] service that is available for transport for a fee. No transports on Friday. He has been taking her but work full time on the other side of [facility]. I also provided him with the numbers for [employer] and senior transportation with [facility]. No additional needs identified at this time.

## 2023-07-27 ASSESSMENT — ENCOUNTER SYMPTOMS
FACIAL SWELLING: 0
VOMITING: 0
NAUSEA: 0
SORE THROAT: 0
COUGH: 0
DIARRHEA: 0
SINUS PRESSURE: 0

## 2023-07-27 NOTE — TELEPHONE ENCOUNTER
Left a voice mail for son, Celestino Ortega [phone], to advise we would suggest [facility] services for pill pack (they deliver also) if the family would want that for Charlotte Gillespie. Please call us and we will let him know what to do next. See note below from Leeann Woodall at [facility]. [facility] would have to order the medication refills from [facility], send a med list, and the family would need to call [facility] and provide payment for medication co pays. Please let us know.

## 2023-07-29 DIAGNOSIS — R25.1 TREMOR: ICD-10-CM

## 2023-07-29 DIAGNOSIS — F33.0 MILD EPISODE OF RECURRENT MAJOR DEPRESSIVE DISORDER (HCC): ICD-10-CM

## 2023-07-31 RX ORDER — PROPRANOLOL HYDROCHLORIDE 10 MG/1
10 TABLET ORAL 2 TIMES DAILY
Qty: 180 TABLET | Refills: 1 | Status: SHIPPED | OUTPATIENT
Start: 2023-07-31

## 2023-07-31 RX ORDER — CITALOPRAM HYDROBROMIDE 10 MG/1
TABLET ORAL
Qty: 180 TABLET | Refills: 1 | Status: SHIPPED | OUTPATIENT
Start: 2023-07-31

## 2023-07-31 RX ORDER — ASPIRIN 81 MG/1
81 TABLET ORAL DAILY
Qty: 90 TABLET | Refills: 1 | Status: SHIPPED | OUTPATIENT
Start: 2023-07-31

## 2023-07-31 NOTE — TELEPHONE ENCOUNTER
Additional Comments  Aspirin 81 mg daily would be a new order to go in her pill packs      Refill request for  celexa, inderal, aspirin 81  medication. [facility] of Pharmacy-  [facility] - ready to set up and needs refills       Last visit -  7/19/2023     Pending visit -  9/20/2023    Last refill - 2/10/2023      Medication Contract signed -PDMP Monitoring:    Last PDMP [place] Reviewed:  Review User Review Instant Review Result   LUKE HANNA 5/24/2021  7:37 AM Reviewed PDMP [1]     [unfilled]  Urine Drug Screenings (1 yr)    No resulted procedures found.        Medication Contract and Consent for Opioid Use Documents Filed       Patient Documents       Type of Document Status Date Received Received By Description    Medication Contract Received 5/21/2021  4:49 PM CHR[facility] Medication Contract                    Last Abdias bello-

## 2023-07-31 NOTE — TELEPHONE ENCOUNTER
Insurance is Medicare primary, [employer] secondary. Spoke with Natalie Ellis. He wants to do pill packs with [facility] but only wants to do 2  RX and aspirin 81. He will consider bringing the oscal, colace, vitamin, to pill pack at a later date because she has so much of it already on hand at assisted living and he does not live close by. I gave him the phone number to [facility] and he will call tomorrow with his Credit Card info. I will fax med list and insurance info to Angel Aguilera at [facility]. Is it ok to fax the med list for prescription and aspirin only? She has colace, vitamin and oscal with her at assisted living.

## 2023-08-25 ENCOUNTER — OFFICE VISIT (OUTPATIENT)
Dept: ORTHOPEDIC SURGERY | Age: 88
End: 2023-08-25

## 2023-08-25 DIAGNOSIS — S72.002D CLOSED FRACTURE OF LEFT HIP WITH ROUTINE HEALING, SUBSEQUENT ENCOUNTER: Primary | ICD-10-CM

## 2023-08-26 DIAGNOSIS — R25.1 TREMOR: ICD-10-CM

## 2023-08-28 RX ORDER — PROPRANOLOL HYDROCHLORIDE 10 MG/1
TABLET ORAL
Qty: 180 TABLET | Refills: 1 | Status: SHIPPED | OUTPATIENT
Start: 2023-08-28

## 2023-08-28 NOTE — TELEPHONE ENCOUNTER
Refill request for  propranolol  medication.      Name of Pharmacy-  [facility]       Last visit -  7/19/2023     Pending visit -  9/20/23    Last refill - 7/31/23      Medication Contract signed -    Last Oarrs ran-          Additional Comments

## 2023-09-19 NOTE — PROGRESS NOTES
2 MG capsule Take 1 capsule by mouth 4 times daily as needed for Diarrhea Yes Historical Provider, MD   Probiotic Product (PROBIOTIC-10 ULTIMATE PO) Take by mouth Yes Historical Provider, MD   melatonin 3 MG TABS tablet Take 1 tablet by mouth daily Yes Historical Provider, MD   propranolol (INDERAL) 10 MG tablet TAKE 1 TABLET BY MOUTH TWICE A DAY  Kulwant Cabrera, APRN - CNP   citalopram (CELEXA) 10 MG tablet TAKE 2 TABLETS BY MOUTH DAILY FOR ANXIETY AT BEDTIME Yes Berenda Guardian, APRN - CNP   aspirin 81 MG EC tablet Take 1 tablet by mouth daily Yes Berenda Guardian, APRN - CNP   acetaminophen (TYLENOL) 325 MG tablet Take 2 tablets by mouth in the morning and 2 tablets at noon and 2 tablets in the evening and 2 tablets before bedtime.  Yes DARIAN Sequeira     Family History   Problem Relation Age of Onset    Heart Defect Mother     Prostate Cancer Father      Social History     Socioeconomic History    Marital status:      Spouse name: Not on file    Number of children: Not on file    Years of education: Not on file    Highest education level: Not on file   Occupational History    Not on file   Tobacco Use    Smoking status: Never    Smokeless tobacco: Never   Vaping Use    Vaping Use: Never used   Substance and Sexual Activity    Alcohol use: Not Currently     Comment: occasional    Drug use: No    Sexual activity: Not Currently     Partners: Male     Comment:    Other Topics Concern    Not on file   Social History Narrative    Not on file     Social Determinants of Health     Financial Resource Strain: Low Risk  (2/10/2023)    Overall Financial Resource Strain (CARDIA)     Difficulty of Paying Living Expenses: Not very hard   Food Insecurity: No Food Insecurity (2/10/2023)    Hunger Vital Sign     Worried About Running Out of Food in the Last Year: Never true     Ran Out of Food in the Last Year: Never true   Transportation Needs: Unknown (2/10/2023)    PRAPARE - Transportation     Lack of Transportation

## 2023-09-20 ENCOUNTER — HOSPITAL ENCOUNTER (OUTPATIENT)
Age: 88
Discharge: HOME | End: 2023-09-20

## 2023-09-20 ENCOUNTER — OFFICE VISIT (OUTPATIENT)
Dept: INTERNAL MEDICINE CLINIC | Age: 88
End: 2023-09-20
Payer: MEDICARE

## 2023-09-20 VITALS
DIASTOLIC BLOOD PRESSURE: 60 MMHG | BODY MASS INDEX: 21.35 KG/M2 | SYSTOLIC BLOOD PRESSURE: 126 MMHG | OXYGEN SATURATION: 94 % | WEIGHT: 124.4 LBS | TEMPERATURE: 97.2 F | HEART RATE: 78 BPM

## 2023-09-20 DIAGNOSIS — R60.0 LEG EDEMA, LEFT: ICD-10-CM

## 2023-09-20 DIAGNOSIS — F33.41 RECURRENT MAJOR DEPRESSIVE DISORDER, IN PARTIAL REMISSION (HCC): ICD-10-CM

## 2023-09-20 DIAGNOSIS — I48.0 PAROXYSMAL ATRIAL FIBRILLATION (HCC): ICD-10-CM

## 2023-09-20 DIAGNOSIS — R19.7 DIARRHEA, UNSPECIFIED TYPE: Primary | ICD-10-CM

## 2023-09-20 PROCEDURE — 1090F PRES/ABSN URINE INCON ASSESS: CPT | Performed by: NURSE PRACTITIONER

## 2023-09-20 PROCEDURE — 99214 OFFICE O/P EST MOD 30 MIN: CPT | Performed by: NURSE PRACTITIONER

## 2023-09-20 PROCEDURE — 1123F ACP DISCUSS/DSCN MKR DOCD: CPT | Performed by: NURSE PRACTITIONER

## 2023-09-20 PROCEDURE — 1036F TOBACCO NON-USER: CPT | Performed by: NURSE PRACTITIONER

## 2023-09-20 PROCEDURE — G8420 CALC BMI NORM PARAMETERS: HCPCS | Performed by: NURSE PRACTITIONER

## 2023-09-20 PROCEDURE — G8427 DOCREV CUR MEDS BY ELIG CLIN: HCPCS | Performed by: NURSE PRACTITIONER

## 2023-09-20 RX ORDER — LOPERAMIDE HYDROCHLORIDE 2 MG/1
2 CAPSULE ORAL 4 TIMES DAILY PRN
COMMUNITY

## 2023-09-20 RX ORDER — LANOLIN ALCOHOL/MO/W.PET/CERES
3 CREAM (GRAM) TOPICAL DAILY
COMMUNITY

## 2023-09-20 ASSESSMENT — ENCOUNTER SYMPTOMS
COUGH: 0
ABDOMINAL PAIN: 1
SINUS PRESSURE: 0
SORE THROAT: 0
DIARRHEA: 1
FACIAL SWELLING: 0
VOMITING: 0
NAUSEA: 0

## 2023-09-20 NOTE — PATIENT INSTRUCTIONS
Perform stool sample testing. Use Imodium as needed currently  Pay attention to any Chest pain or Shortness of breath.    TubiGrip sleeve for swelling in left lower extremity  Schedule ultrasound left leg - [phone]

## 2023-09-21 DIAGNOSIS — R19.7 DIARRHEA, UNSPECIFIED TYPE: ICD-10-CM

## 2023-09-21 LAB — C DIFF TOX A+B STL QL IA: NORMAL

## 2023-09-22 LAB
CRYPTOSP AG STL QL IA: NORMAL
E HISTOLYT AG STL QL IA: NORMAL
G LAMBLIA AG STL QL IA: NORMAL
GI PATHOGENS PNL STL NAA+PROBE: NORMAL

## 2023-09-28 ENCOUNTER — TELEPHONE (OUTPATIENT)
Dept: INTERNAL MEDICINE CLINIC | Age: 88
End: 2023-09-28

## 2023-09-28 NOTE — TELEPHONE ENCOUNTER
Stool studies reviewed by cinthia arcos CNP. Advised grandson all were negative. Stay hydrated and immodium as needed. He understood. I have reviewed the provider's instructions with the patient, answering all questions to their satisfaction.

## 2023-10-02 NOTE — TELEPHONE ENCOUNTER
Please call pt to find out how diarrhea is doing and how uncontrolled or controlled her symptoms have been over the last 4-5 days. How often? How much imodium?

## 2023-10-02 NOTE — TELEPHONE ENCOUNTER
Pt grandson stated she is on the brat diet and its helped. Grandson advised slowly start back with dairy but nothing to fast. She has improved slightly.

## 2023-10-06 ENCOUNTER — TELEPHONE (OUTPATIENT)
Dept: INTERNAL MEDICINE CLINIC | Age: 88
End: 2023-10-06

## 2023-10-06 ENCOUNTER — HOSPITAL ENCOUNTER (OUTPATIENT)
Dept: VASCULAR LAB | Age: 88
Discharge: HOME | End: 2023-10-06
Payer: MEDICARE

## 2023-10-06 DIAGNOSIS — R60.0 LEG EDEMA, LEFT: ICD-10-CM

## 2023-10-06 DIAGNOSIS — I82.402 ACUTE DEEP VEIN THROMBOSIS (DVT) OF LEFT LOWER EXTREMITY, UNSPECIFIED VEIN (HCC): Primary | ICD-10-CM

## 2023-10-06 PROCEDURE — 93971 EXTREMITY STUDY: CPT

## 2023-10-06 NOTE — TELEPHONE ENCOUNTER
Patient's granddaughter, Lady Spence, called and said patient wants to know if it is okay for her to continue physical therapy since they found blood clots in her leg this morning. She also wanted it noted in her chart that she had mentioned to the ortho doctor that she has had numbness in her leg for a while.   Lady Spence can be reached at [phone], or you can call grandson, Julissa Madrid, at [phone]

## 2023-10-11 PROBLEM — S72.142A INTERTROCHANTERIC FRACTURE OF LEFT HIP, CLOSED, INITIAL ENCOUNTER (HCC): Status: RESOLVED | Noted: 2023-05-13 | Resolved: 2023-10-11

## 2023-10-12 ENCOUNTER — OFFICE VISIT (OUTPATIENT)
Dept: INTERNAL MEDICINE CLINIC | Age: 88
End: 2023-10-12
Payer: MEDICARE

## 2023-10-12 VITALS
TEMPERATURE: 97 F | SYSTOLIC BLOOD PRESSURE: 130 MMHG | DIASTOLIC BLOOD PRESSURE: 74 MMHG | OXYGEN SATURATION: 97 % | WEIGHT: 118 LBS | HEART RATE: 70 BPM | BODY MASS INDEX: 20.25 KG/M2

## 2023-10-12 DIAGNOSIS — S72.002D CLOSED FRACTURE DISLOCATION OF LEFT HIP JOINT WITH ROUTINE HEALING, SUBSEQUENT ENCOUNTER: ICD-10-CM

## 2023-10-12 DIAGNOSIS — M54.50 ACUTE BILATERAL LOW BACK PAIN WITHOUT SCIATICA: ICD-10-CM

## 2023-10-12 DIAGNOSIS — R19.7 DIARRHEA, UNSPECIFIED TYPE: ICD-10-CM

## 2023-10-12 DIAGNOSIS — I82.462 ACUTE DEEP VEIN THROMBOSIS (DVT) OF CALF MUSCLE VEIN OF LEFT LOWER EXTREMITY (HCC): Primary | ICD-10-CM

## 2023-10-12 DIAGNOSIS — Z23 NEED FOR INFLUENZA VACCINATION: ICD-10-CM

## 2023-10-12 DIAGNOSIS — R63.4 WEIGHT LOSS: ICD-10-CM

## 2023-10-12 PROCEDURE — G0008 ADMIN INFLUENZA VIRUS VAC: HCPCS | Performed by: NURSE PRACTITIONER

## 2023-10-12 PROCEDURE — 1036F TOBACCO NON-USER: CPT | Performed by: NURSE PRACTITIONER

## 2023-10-12 PROCEDURE — 1123F ACP DISCUSS/DSCN MKR DOCD: CPT | Performed by: NURSE PRACTITIONER

## 2023-10-12 PROCEDURE — 99214 OFFICE O/P EST MOD 30 MIN: CPT | Performed by: NURSE PRACTITIONER

## 2023-10-12 PROCEDURE — 90694 VACC AIIV4 NO PRSRV 0.5ML IM: CPT | Performed by: NURSE PRACTITIONER

## 2023-10-12 PROCEDURE — 1090F PRES/ABSN URINE INCON ASSESS: CPT | Performed by: NURSE PRACTITIONER

## 2023-10-12 PROCEDURE — G8484 FLU IMMUNIZE NO ADMIN: HCPCS | Performed by: NURSE PRACTITIONER

## 2023-10-12 PROCEDURE — G8420 CALC BMI NORM PARAMETERS: HCPCS | Performed by: NURSE PRACTITIONER

## 2023-10-12 PROCEDURE — G8427 DOCREV CUR MEDS BY ELIG CLIN: HCPCS | Performed by: NURSE PRACTITIONER

## 2023-10-12 RX ORDER — CEPHALEXIN 500 MG/1
500 CAPSULE ORAL 3 TIMES DAILY
Qty: 21 CAPSULE | Refills: 0 | Status: ON HOLD | OUTPATIENT
Start: 2023-10-12 | End: 2023-10-19

## 2023-10-12 NOTE — PATIENT INSTRUCTIONS
Maintain Eliquis twice a day for 6 months - will discuss medication change in 6 months    No ibuprofen, advil, motrin, naprosyn, aleve while on Eliquis - use Tylenol    Keflex antibiotic three times a day for diarrhea. Eat as much as you can!  Monitoring weight closely    Use tylenol ES up to 3 times a day

## 2023-10-13 ENCOUNTER — HOSPITAL ENCOUNTER (INPATIENT)
Age: 88
LOS: 2 days | Discharge: NURSING, REHAB OR LONG-TERM CARE | DRG: 543 | End: 2023-10-17
Attending: STUDENT IN AN ORGANIZED HEALTH CARE EDUCATION/TRAINING PROGRAM | Admitting: HOSPITALIST
Payer: MEDICARE

## 2023-10-13 ENCOUNTER — APPOINTMENT (OUTPATIENT)
Dept: GENERAL RADIOLOGY | Age: 88
DRG: 543 | End: 2023-10-13
Payer: MEDICARE

## 2023-10-13 DIAGNOSIS — S32.010S COMPRESSION FRACTURE OF L1 LUMBAR VERTEBRA, SEQUELA: ICD-10-CM

## 2023-10-13 DIAGNOSIS — S32.010A COMPRESSION FRACTURE OF L1 VERTEBRA, INITIAL ENCOUNTER (HCC): Primary | ICD-10-CM

## 2023-10-13 DIAGNOSIS — M54.50 ACUTE MIDLINE LOW BACK PAIN WITHOUT SCIATICA: ICD-10-CM

## 2023-10-13 PROCEDURE — 72100 X-RAY EXAM L-S SPINE 2/3 VWS: CPT

## 2023-10-13 PROCEDURE — 99285 EMERGENCY DEPT VISIT HI MDM: CPT

## 2023-10-13 PROCEDURE — 6370000000 HC RX 637 (ALT 250 FOR IP): Performed by: PHYSICIAN ASSISTANT

## 2023-10-13 RX ORDER — LIDOCAINE 4 G/G
1 PATCH TOPICAL DAILY
Status: DISCONTINUED | OUTPATIENT
Start: 2023-10-13 | End: 2023-10-14

## 2023-10-13 RX ORDER — LIDOCAINE 4 G/G
1 PATCH TOPICAL DAILY
Status: DISCONTINUED | OUTPATIENT
Start: 2023-10-14 | End: 2023-10-13

## 2023-10-13 ASSESSMENT — PAIN SCALES - GENERAL
PAINLEVEL_OUTOF10: 8
PAINLEVEL_OUTOF10: 9

## 2023-10-13 ASSESSMENT — ENCOUNTER SYMPTOMS
NAUSEA: 0
COUGH: 0
VOMITING: 0
SORE THROAT: 0
DIARRHEA: 1
SINUS PRESSURE: 0
BACK PAIN: 1
FACIAL SWELLING: 0

## 2023-10-13 ASSESSMENT — PAIN - FUNCTIONAL ASSESSMENT: PAIN_FUNCTIONAL_ASSESSMENT: 0-10

## 2023-10-13 ASSESSMENT — PAIN DESCRIPTION - LOCATION: LOCATION: BACK

## 2023-10-13 ASSESSMENT — PAIN DESCRIPTION - ORIENTATION: ORIENTATION: RIGHT;LEFT;LOWER

## 2023-10-14 PROBLEM — I82.462 ACUTE DEEP VEIN THROMBOSIS (DVT) OF CALF MUSCLE VEIN OF LEFT LOWER EXTREMITY (HCC): Status: ACTIVE | Noted: 2023-10-14

## 2023-10-14 PROBLEM — M54.50 LOW BACK PAIN: Status: ACTIVE | Noted: 2023-10-14

## 2023-10-14 LAB
ANION GAP SERPL CALCULATED.3IONS-SCNC: 12 MMOL/L (ref 3–16)
BASOPHILS # BLD: 0 K/UL (ref 0–0.2)
BASOPHILS NFR BLD: 0.9 %
BUN SERPL-MCNC: 9 MG/DL (ref 7–20)
CALCIUM SERPL-MCNC: 8.7 MG/DL (ref 8.3–10.6)
CHLORIDE SERPL-SCNC: 98 MMOL/L (ref 99–110)
CO2 SERPL-SCNC: 24 MMOL/L (ref 21–32)
CREAT SERPL-MCNC: <0.5 MG/DL (ref 0.6–1.2)
DEPRECATED RDW RBC AUTO: 17.3 % (ref 12.4–15.4)
EOSINOPHIL # BLD: 0.1 K/UL (ref 0–0.6)
EOSINOPHIL NFR BLD: 2.7 %
GFR SERPLBLD CREATININE-BSD FMLA CKD-EPI: >60 ML/MIN/{1.73_M2}
GLUCOSE SERPL-MCNC: 85 MG/DL (ref 70–99)
HCT VFR BLD AUTO: 36.7 % (ref 36–48)
HGB BLD-MCNC: 12.2 G/DL (ref 12–16)
LYMPHOCYTES # BLD: 1.6 K/UL (ref 1–5.1)
LYMPHOCYTES NFR BLD: 28.6 %
MCH RBC QN AUTO: 29.6 PG (ref 26–34)
MCHC RBC AUTO-ENTMCNC: 33.2 G/DL (ref 31–36)
MCV RBC AUTO: 89.1 FL (ref 80–100)
MONOCYTES # BLD: 0.7 K/UL (ref 0–1.3)
MONOCYTES NFR BLD: 11.9 %
NEUTROPHILS # BLD: 3.1 K/UL (ref 1.7–7.7)
NEUTROPHILS NFR BLD: 55.9 %
PLATELET # BLD AUTO: 178 K/UL (ref 135–450)
PMV BLD AUTO: 8 FL (ref 5–10.5)
POTASSIUM SERPL-SCNC: 3.7 MMOL/L (ref 3.5–5.1)
RBC # BLD AUTO: 4.13 M/UL (ref 4–5.2)
SODIUM SERPL-SCNC: 134 MMOL/L (ref 136–145)
WBC # BLD AUTO: 5.5 K/UL (ref 4–11)

## 2023-10-14 PROCEDURE — 6370000000 HC RX 637 (ALT 250 FOR IP): Performed by: INTERNAL MEDICINE

## 2023-10-14 PROCEDURE — 6370000000 HC RX 637 (ALT 250 FOR IP): Performed by: NURSE PRACTITIONER

## 2023-10-14 PROCEDURE — 36415 COLL VENOUS BLD VENIPUNCTURE: CPT

## 2023-10-14 PROCEDURE — 6370000000 HC RX 637 (ALT 250 FOR IP): Performed by: PHYSICIAN ASSISTANT

## 2023-10-14 PROCEDURE — 2580000003 HC RX 258: Performed by: INTERNAL MEDICINE

## 2023-10-14 PROCEDURE — 85025 COMPLETE CBC W/AUTO DIFF WBC: CPT

## 2023-10-14 PROCEDURE — 80048 BASIC METABOLIC PNL TOTAL CA: CPT

## 2023-10-14 RX ORDER — OXYCODONE HYDROCHLORIDE AND ACETAMINOPHEN 5; 325 MG/1; MG/1
1 TABLET ORAL ONCE
Status: COMPLETED | OUTPATIENT
Start: 2023-10-14 | End: 2023-10-14

## 2023-10-14 RX ORDER — POTASSIUM CHLORIDE 20 MEQ/1
40 TABLET, EXTENDED RELEASE ORAL PRN
Status: DISCONTINUED | OUTPATIENT
Start: 2023-10-14 | End: 2023-10-17 | Stop reason: HOSPADM

## 2023-10-14 RX ORDER — OXYCODONE AND ACETAMINOPHEN 10; 325 MG/1; MG/1
1 TABLET ORAL EVERY 4 HOURS PRN
Status: DISCONTINUED | OUTPATIENT
Start: 2023-10-14 | End: 2023-10-14

## 2023-10-14 RX ORDER — LORAZEPAM 2 MG/ML
1 INJECTION INTRAMUSCULAR ONCE
Status: COMPLETED | OUTPATIENT
Start: 2023-10-14 | End: 2023-10-15

## 2023-10-14 RX ORDER — ACETAMINOPHEN 325 MG/1
650 TABLET ORAL EVERY 6 HOURS PRN
Status: DISCONTINUED | OUTPATIENT
Start: 2023-10-14 | End: 2023-10-17 | Stop reason: HOSPADM

## 2023-10-14 RX ORDER — SODIUM CHLORIDE 0.9 % (FLUSH) 0.9 %
10 SYRINGE (ML) INJECTION PRN
Status: DISCONTINUED | OUTPATIENT
Start: 2023-10-14 | End: 2023-10-17 | Stop reason: HOSPADM

## 2023-10-14 RX ORDER — PROPRANOLOL HYDROCHLORIDE 10 MG/1
10 TABLET ORAL 2 TIMES DAILY
Status: DISCONTINUED | OUTPATIENT
Start: 2023-10-14 | End: 2023-10-17 | Stop reason: HOSPADM

## 2023-10-14 RX ORDER — LANOLIN ALCOHOL/MO/W.PET/CERES
3 CREAM (GRAM) TOPICAL NIGHTLY
Status: DISCONTINUED | OUTPATIENT
Start: 2023-10-14 | End: 2023-10-17 | Stop reason: HOSPADM

## 2023-10-14 RX ORDER — SODIUM CHLORIDE 9 MG/ML
INJECTION, SOLUTION INTRAVENOUS PRN
Status: DISCONTINUED | OUTPATIENT
Start: 2023-10-14 | End: 2023-10-17 | Stop reason: HOSPADM

## 2023-10-14 RX ORDER — ONDANSETRON 2 MG/ML
4 INJECTION INTRAMUSCULAR; INTRAVENOUS EVERY 6 HOURS PRN
Status: DISCONTINUED | OUTPATIENT
Start: 2023-10-14 | End: 2023-10-17 | Stop reason: HOSPADM

## 2023-10-14 RX ORDER — CITALOPRAM 20 MG/1
20 TABLET ORAL NIGHTLY
Status: DISCONTINUED | OUTPATIENT
Start: 2023-10-14 | End: 2023-10-17 | Stop reason: HOSPADM

## 2023-10-14 RX ORDER — POTASSIUM CHLORIDE 7.45 MG/ML
10 INJECTION INTRAVENOUS PRN
Status: DISCONTINUED | OUTPATIENT
Start: 2023-10-14 | End: 2023-10-17 | Stop reason: HOSPADM

## 2023-10-14 RX ORDER — OXYCODONE HYDROCHLORIDE 5 MG/1
10 TABLET ORAL EVERY 4 HOURS PRN
Status: DISCONTINUED | OUTPATIENT
Start: 2023-10-14 | End: 2023-10-17 | Stop reason: HOSPADM

## 2023-10-14 RX ORDER — ONDANSETRON 4 MG/1
4 TABLET, ORALLY DISINTEGRATING ORAL EVERY 8 HOURS PRN
Status: DISCONTINUED | OUTPATIENT
Start: 2023-10-14 | End: 2023-10-17 | Stop reason: HOSPADM

## 2023-10-14 RX ORDER — ACETAMINOPHEN 650 MG/1
650 SUPPOSITORY RECTAL EVERY 6 HOURS PRN
Status: DISCONTINUED | OUTPATIENT
Start: 2023-10-14 | End: 2023-10-17 | Stop reason: HOSPADM

## 2023-10-14 RX ORDER — MAGNESIUM SULFATE 1 G/100ML
1000 INJECTION INTRAVENOUS PRN
Status: DISCONTINUED | OUTPATIENT
Start: 2023-10-14 | End: 2023-10-17 | Stop reason: HOSPADM

## 2023-10-14 RX ORDER — OXYCODONE HYDROCHLORIDE 5 MG/1
5 TABLET ORAL EVERY 4 HOURS PRN
Status: DISCONTINUED | OUTPATIENT
Start: 2023-10-14 | End: 2023-10-17 | Stop reason: HOSPADM

## 2023-10-14 RX ADMIN — CITALOPRAM 20 MG: 20 TABLET, FILM COATED ORAL at 20:21

## 2023-10-14 RX ADMIN — Medication 10 ML: at 20:24

## 2023-10-14 RX ADMIN — PROPRANOLOL HYDROCHLORIDE 10 MG: 10 TABLET ORAL at 09:35

## 2023-10-14 RX ADMIN — APIXABAN 5 MG: 5 TABLET, FILM COATED ORAL at 09:35

## 2023-10-14 RX ADMIN — OXYCODONE AND ACETAMINOPHEN 1 TABLET: 5; 325 TABLET ORAL at 02:44

## 2023-10-14 RX ADMIN — APIXABAN 5 MG: 5 TABLET, FILM COATED ORAL at 20:21

## 2023-10-14 RX ADMIN — OXYCODONE HYDROCHLORIDE 10 MG: 5 TABLET ORAL at 12:55

## 2023-10-14 RX ADMIN — Medication 3 MG: at 20:21

## 2023-10-14 RX ADMIN — PROPRANOLOL HYDROCHLORIDE 10 MG: 10 TABLET ORAL at 20:21

## 2023-10-14 ASSESSMENT — PAIN DESCRIPTION - DESCRIPTORS
DESCRIPTORS: ACHING;DISCOMFORT

## 2023-10-14 ASSESSMENT — PAIN SCALES - GENERAL
PAINLEVEL_OUTOF10: 7
PAINLEVEL_OUTOF10: 3
PAINLEVEL_OUTOF10: 7
PAINLEVEL_OUTOF10: 2
PAINLEVEL_OUTOF10: 8
PAINLEVEL_OUTOF10: 7
PAINLEVEL_OUTOF10: 2
PAINLEVEL_OUTOF10: 3

## 2023-10-14 ASSESSMENT — PAIN DESCRIPTION - LOCATION
LOCATION: BACK

## 2023-10-14 ASSESSMENT — PAIN DESCRIPTION - ORIENTATION
ORIENTATION: LOWER

## 2023-10-14 NOTE — ED NOTES
Pulses intact BLE. L calf hematoma.  10-5-23 and started on Eliquis.       Deedee Parr RN  10/13/23 [id number]

## 2023-10-14 NOTE — CARE COORDINATION
Case Management Assessment  Initial Evaluation    Date/Time of Evaluation: 10/14/2023 3:44 PM  Assessment Completed by: Jd Macias RN    If patient is discharged prior to next notation, then this note serves as note for discharge by case management. Patient Name: Tacho Meza                   YOB: 1935  Diagnosis: Low back pain [M54.50]  Compression fracture of L1 vertebra, initial encounter ([address]) [E12.504S]                   Date / Time: 10/13/2023  9:21 PM    Patient Admission Status: Outpatient in a bed   Readmission Risk (Low < 19, Mod (19-27), High > 27): Readmission Risk Score: 15.6    Current PCP: DEBBIE Marques CNP  PCP verified by CM? Yes    Chart Reviewed: Yes      History Provided by: Patient  Patient Orientation: Alert and Oriented, Person, Place, Situation    Patient Cognition: Alert    Hospitalization in the last 30 days (Readmission):  No    If yes, Readmission Assessment in CM Navigator will be completed. Advance Directives:      Code Status: Full Code   Patient's Primary Decision Maker is: Legal Next of Kin    Primary Decision Maker: sebastien bangura - Healthcare Decision Maker - [phone]    Primary Decision Maker: Caleb Kwan - [phone]    Supplemental (Other) Decision Maker: Ursula Meza - Child - [phone]    Discharge Planning:    Patient lives with: Alone Type of Home: [facility]  Primary Care Giver: Self  Patient Support Systems include: Children   Current Financial resources: Medicare  Current community resources: None  Current services prior to admission: None            Current DME:              Type of Home Care services:  PT, OT, Nursing Services    ADLS  Prior functional level: Independent in ADLs/IADLs  Current functional level: Assistance with the following:, Mobility, Bathing, Dressing    PT AM-PAC:   /24  OT AM-PAC:   /24    Family can provide assistance at DC:  Yes  Would you like Case Management to discuss the discharge plan with any

## 2023-10-14 NOTE — ED NOTES
C3 RN called. Telephone report given. No further questions at this time.       Corin Renya, RN  10/14/23 0428

## 2023-10-14 NOTE — CONSULTS
Consult Placed   Left dylan lynch  Who: [facility]  Date:10/14/2023    Time:08:17AM     Electronically signed by Kenton Silva on 10/14/2023 at 8:17 AM

## 2023-10-14 NOTE — PLAN OF CARE
[place] neurosurgery note    Called regarding age indeterminate L1 severe compression fx. Images reviewed. Agree severe age indeterminate L1 comp fx with multiple other old comp. Fxs incuding T12. Spoke to nurse who reports patients moving legs well and got up to go to bathroom. Rec:  MRI L spine to eval acuity of fx and neural elements  TLSO brace (to be ordered from [employer] brace [facility])  Upright xrays after brace arrives.      Junior Beaulieu MD

## 2023-10-14 NOTE — PLAN OF CARE
Problem: Pain  Goal: Verbalizes/displays adequate comfort level or baseline comfort level  10/14/2023 1315 by Emilia Kwok RN  Outcome: Progressing  Flowsheets  Taken 10/14/2023 1315  Verbalizes/displays adequate comfort level or baseline comfort level:   Encourage patient to monitor pain and request assistance   Assess pain using appropriate pain scale   Administer analgesics based on type and severity of pain and evaluate response   Implement non-pharmacological measures as appropriate and evaluate response  Taken 10/14/2023 0900  Verbalizes/displays adequate comfort level or baseline comfort level:   Encourage patient to monitor pain and request assistance   Assess pain using appropriate pain scale   Implement non-pharmacological measures as appropriate and evaluate response     Problem: Safety - Adult  Goal: Free from fall injury  10/14/2023 1315 by Emilia Kwko RN  Outcome: Progressing  Flowsheets (Taken 10/14/2023 1315)  Free From Fall Injury:   Instruct family/caregiver on patient safety   Based on caregiver fall risk screen, instruct family/caregiver to ask for assistance with transferring infant if caregiver noted to have fall risk factors     Problem: ABCDS Injury Assessment  Goal: Absence of physical injury  Outcome: Progressing  Flowsheets (Taken 10/14/2023 1315)  Absence of Physical Injury: Implement safety measures based on patient assessment

## 2023-10-14 NOTE — CONSULTS
Consult Call Back    Who:All Tucker MD  Date:10/14/2023,  Time:10:35 AM    Electronically signed by Brien Ferguson on 10/14/23 at 10:35 AM EDT

## 2023-10-14 NOTE — PLAN OF CARE
Problem: Pain  Goal: Verbalizes/displays adequate comfort level or baseline comfort level  Outcome: Progressing  Flowsheets (Taken 10/14/2023 0518)  Verbalizes/displays adequate comfort level or baseline comfort level:   Encourage patient to monitor pain and request assistance   Assess pain using appropriate pain scale   Administer analgesics based on type and severity of pain and evaluate response   Implement non-pharmacological measures as appropriate and evaluate response     Problem: Safety - Adult  Goal: Free from fall injury  Outcome: Progressing  Flowsheets (Taken 10/14/2023 0518)  Free From Fall Injury: Instruct family/caregiver on patient safety

## 2023-10-15 ENCOUNTER — APPOINTMENT (OUTPATIENT)
Dept: MRI IMAGING | Age: 88
DRG: 543 | End: 2023-10-15
Payer: MEDICARE

## 2023-10-15 PROBLEM — S32.010S COMPRESSION FRACTURE OF L1 LUMBAR VERTEBRA, SEQUELA: Status: ACTIVE | Noted: 2023-10-15

## 2023-10-15 PROCEDURE — 6360000002 HC RX W HCPCS: Performed by: NURSE PRACTITIONER

## 2023-10-15 PROCEDURE — 97162 PT EVAL MOD COMPLEX 30 MIN: CPT

## 2023-10-15 PROCEDURE — 6370000000 HC RX 637 (ALT 250 FOR IP): Performed by: NURSE PRACTITIONER

## 2023-10-15 PROCEDURE — 72148 MRI LUMBAR SPINE W/O DYE: CPT

## 2023-10-15 PROCEDURE — 6370000000 HC RX 637 (ALT 250 FOR IP): Performed by: INTERNAL MEDICINE

## 2023-10-15 PROCEDURE — 97166 OT EVAL MOD COMPLEX 45 MIN: CPT

## 2023-10-15 PROCEDURE — 97530 THERAPEUTIC ACTIVITIES: CPT

## 2023-10-15 PROCEDURE — 72146 MRI CHEST SPINE W/O DYE: CPT

## 2023-10-15 PROCEDURE — 1200000000 HC SEMI PRIVATE

## 2023-10-15 RX ORDER — LORAZEPAM 2 MG/ML
1 INJECTION INTRAMUSCULAR ONCE
Status: COMPLETED | OUTPATIENT
Start: 2023-10-15 | End: 2023-10-15

## 2023-10-15 RX ADMIN — OXYCODONE HYDROCHLORIDE 10 MG: 5 TABLET ORAL at 08:03

## 2023-10-15 RX ADMIN — APIXABAN 5 MG: 5 TABLET, FILM COATED ORAL at 19:45

## 2023-10-15 RX ADMIN — APIXABAN 5 MG: 5 TABLET, FILM COATED ORAL at 08:35

## 2023-10-15 RX ADMIN — CITALOPRAM 20 MG: 20 TABLET, FILM COATED ORAL at 19:45

## 2023-10-15 RX ADMIN — Medication 3 MG: at 19:45

## 2023-10-15 RX ADMIN — LORAZEPAM 1 MG: 2 INJECTION INTRAMUSCULAR; INTRAVENOUS at 09:09

## 2023-10-15 RX ADMIN — OXYCODONE HYDROCHLORIDE 10 MG: 5 TABLET ORAL at 00:27

## 2023-10-15 RX ADMIN — LORAZEPAM 1 MG: 2 INJECTION INTRAMUSCULAR; INTRAVENOUS at 17:41

## 2023-10-15 RX ADMIN — OXYCODONE HYDROCHLORIDE 10 MG: 5 TABLET ORAL at 19:49

## 2023-10-15 RX ADMIN — PROPRANOLOL HYDROCHLORIDE 10 MG: 10 TABLET ORAL at 19:45

## 2023-10-15 RX ADMIN — PROPRANOLOL HYDROCHLORIDE 10 MG: 10 TABLET ORAL at 08:34

## 2023-10-15 ASSESSMENT — PAIN DESCRIPTION - DESCRIPTORS
DESCRIPTORS: ACHING;DISCOMFORT
DESCRIPTORS: ACHING
DESCRIPTORS: ACHING;DISCOMFORT
DESCRIPTORS: ACHING;DISCOMFORT

## 2023-10-15 ASSESSMENT — PAIN SCALES - GENERAL
PAINLEVEL_OUTOF10: 7
PAINLEVEL_OUTOF10: 7
PAINLEVEL_OUTOF10: 3
PAINLEVEL_OUTOF10: 3
PAINLEVEL_OUTOF10: 7
PAINLEVEL_OUTOF10: 3
PAINLEVEL_OUTOF10: 8

## 2023-10-15 ASSESSMENT — PAIN DESCRIPTION - LOCATION
LOCATION: BACK

## 2023-10-15 ASSESSMENT — PAIN - FUNCTIONAL ASSESSMENT
PAIN_FUNCTIONAL_ASSESSMENT: PREVENTS OR INTERFERES SOME ACTIVE ACTIVITIES AND ADLS

## 2023-10-15 ASSESSMENT — PAIN DESCRIPTION - ORIENTATION
ORIENTATION: LOWER;MID
ORIENTATION: LOWER
ORIENTATION: LOWER

## 2023-10-15 NOTE — PLAN OF CARE
Problem: Pain  Goal: Verbalizes/displays adequate comfort level or baseline comfort level  10/15/2023 1116 by Sumaya Neves RN  Outcome: Progressing  Flowsheets (Taken 10/15/2023 1116)  Verbalizes/displays adequate comfort level or baseline comfort level:   Encourage patient to monitor pain and request assistance   Assess pain using appropriate pain scale   Administer analgesics based on type and severity of pain and evaluate response   Implement non-pharmacological measures as appropriate and evaluate response     Problem: Safety - Adult  Goal: Free from fall injury  Outcome: Progressing  Flowsheets (Taken 10/15/2023 1116)  Free From Fall Injury:   Instruct family/caregiver on patient safety   Based on caregiver fall risk screen, instruct family/caregiver to ask for assistance with transferring infant if caregiver noted to have fall risk factors     Problem: ABCDS Injury Assessment  Goal: Absence of physical injury  Outcome: Progressing  Flowsheets (Taken 10/15/2023 1116)  Absence of Physical Injury: Implement safety measures based on patient assessment

## 2023-10-15 NOTE — PLAN OF CARE
Problem: Pain  Goal: Verbalizes/displays adequate comfort level or baseline comfort level  10/14/2023 2[facility]  Outcome: Progressing  10/14/2023 1315 by Darren Bailey RN  Outcome: Progressing  Flowsheets  Taken 10/14/2023 1315  Verbalizes/displays adequate comfort level or baseline comfort level:   Encourage patient to monitor pain and request assistance   Assess pain using appropriate pain scale   Administer analgesics based on type and severity of pain and evaluate response   Implement non-pharmacological measures as appropriate and evaluate response  Taken 10/14/2023 0900  Verbalizes/displays adequate comfort level or baseline comfort level:   Encourage patient to monitor pain and request assistance   Assess pain using appropriate pain scale   Implement non-pharmacological measures as appropriate and evaluate response     Problem: Safety - Adult  Goal: Free from fall injury  10/14/2023 1315 by Darren Bailey RN  Outcome: Progressing  Flowsheets (Taken 10/14/2023 1315)  Free From Fall Injury:   Instruct family/caregiver on patient safety   Based on caregiver fall risk screen, instruct family/caregiver to ask for assistance with transferring infant if caregiver noted to have fall risk factors     Problem: ABCDS Injury Assessment  Goal: Absence of physical injury  10/14/2023 1315 by Darren Bailey RN  Outcome: Progressing  Flowsheets (Taken 10/14/2023 1315)  Absence of Physical Injury: Implement safety measures based on patient assessment

## 2023-10-15 NOTE — ED PROVIDER NOTES
[facility] Emergency Department    CHIEF COMPLAINT  Back Pain (Patient had left hip surgery five months ago, went to PCP yesterday for swollen foot, x-rays not done. Complaining of lower back pain that has been going on for approximately 2 days.)      SHARED SERVICE VISIT  I have seen and evaluated this patient with my supervising physician, Dr. Lorrie Moore. HISTORY OF PRESENT ILLNESS  Keely Weber is a 80 y.o. female who presents to the ED complaining of severe back pain ongoing for the past 2 days. She has been experiencing severe atraumatic back pain ongoing for the past 48 hours. She does have history of chronic back pain as well as surgery approximately 5 months ago. She says she has had sudden severe onset low back pain that she has tried taking over-the-counter pain medications which have not helped with pain. She says she normally has bad reactions with opiate medications at home but has concerns about taking it this time. She denies any lower extremity weakness or numbness. She denies any bowel or bladder incontinence. She is having decreased mobility due to pain. She currently is in assisted living facility and family is concerned of a plan to care for self facility now. She denies any headache, body ache, fevers or chills. Denies any coughing or sneezing. Denies any sore throat or congestion. Denies any vision changes or dizziness. She denies any chest pain, shortness of breath, dyspnea on exertion. Denies any nausea, vomiting, or abdominal pain. She denies any urinary symptoms. She denies any diarrhea or bloody stools. She denies any recent travel or sick contacts. No other complaints, modifying factors or associated symptoms. Nursing notes reviewed.    Past Medical History:   Diagnosis Date    Acute deep vein thrombosis (DVT) of calf muscle vein of left lower extremity ([address]) 10/12/2023    Arthritis     Cataract     H/O colonoscopy

## 2023-10-15 NOTE — PLAN OF CARE
[place] neurosurgery note    MRI reviewed. Acute L1 fx. Also with T12/L1 disc herniation, but no axial images through that level. Rec:  TLSO brace for L1 fx  MRI thoracic spine with axials through T12/L1.       Kendell Brunner, MD

## 2023-10-16 LAB
ANION GAP SERPL CALCULATED.3IONS-SCNC: 14 MMOL/L (ref 3–16)
BASOPHILS # BLD: 0 K/UL (ref 0–0.2)
BASOPHILS NFR BLD: 0.8 %
BUN SERPL-MCNC: 13 MG/DL (ref 7–20)
CALCIUM SERPL-MCNC: 8.9 MG/DL (ref 8.3–10.6)
CHLORIDE SERPL-SCNC: 97 MMOL/L (ref 99–110)
CO2 SERPL-SCNC: 24 MMOL/L (ref 21–32)
CREAT SERPL-MCNC: <0.5 MG/DL (ref 0.6–1.2)
DEPRECATED RDW RBC AUTO: 17.4 % (ref 12.4–15.4)
EOSINOPHIL # BLD: 0.2 K/UL (ref 0–0.6)
EOSINOPHIL NFR BLD: 3.4 %
GFR SERPLBLD CREATININE-BSD FMLA CKD-EPI: >60 ML/MIN/{1.73_M2}
GLUCOSE SERPL-MCNC: 106 MG/DL (ref 70–99)
HCT VFR BLD AUTO: 36.7 % (ref 36–48)
HGB BLD-MCNC: 12.1 G/DL (ref 12–16)
LYMPHOCYTES # BLD: 1.2 K/UL (ref 1–5.1)
LYMPHOCYTES NFR BLD: 26.5 %
MAGNESIUM SERPL-MCNC: 1.7 MG/DL (ref 1.8–2.4)
MCH RBC QN AUTO: 29.7 PG (ref 26–34)
MCHC RBC AUTO-ENTMCNC: 33 G/DL (ref 31–36)
MCV RBC AUTO: 90 FL (ref 80–100)
MONOCYTES # BLD: 0.7 K/UL (ref 0–1.3)
MONOCYTES NFR BLD: 14.9 %
NEUTROPHILS # BLD: 2.5 K/UL (ref 1.7–7.7)
NEUTROPHILS NFR BLD: 54.4 %
PLATELET # BLD AUTO: 177 K/UL (ref 135–450)
PMV BLD AUTO: 8.1 FL (ref 5–10.5)
POTASSIUM SERPL-SCNC: 3.5 MMOL/L (ref 3.5–5.1)
RBC # BLD AUTO: 4.08 M/UL (ref 4–5.2)
SODIUM SERPL-SCNC: 135 MMOL/L (ref 136–145)
WBC # BLD AUTO: 4.6 K/UL (ref 4–11)

## 2023-10-16 PROCEDURE — 6370000000 HC RX 637 (ALT 250 FOR IP): Performed by: NURSE PRACTITIONER

## 2023-10-16 PROCEDURE — 80048 BASIC METABOLIC PNL TOTAL CA: CPT

## 2023-10-16 PROCEDURE — 83735 ASSAY OF MAGNESIUM: CPT

## 2023-10-16 PROCEDURE — 85025 COMPLETE CBC W/AUTO DIFF WBC: CPT

## 2023-10-16 PROCEDURE — 2580000003 HC RX 258: Performed by: INTERNAL MEDICINE

## 2023-10-16 PROCEDURE — 6370000000 HC RX 637 (ALT 250 FOR IP): Performed by: INTERNAL MEDICINE

## 2023-10-16 PROCEDURE — 1200000000 HC SEMI PRIVATE

## 2023-10-16 PROCEDURE — 36415 COLL VENOUS BLD VENIPUNCTURE: CPT

## 2023-10-16 PROCEDURE — 6360000002 HC RX W HCPCS: Performed by: INTERNAL MEDICINE

## 2023-10-16 RX ADMIN — PROPRANOLOL HYDROCHLORIDE 10 MG: 10 TABLET ORAL at 08:36

## 2023-10-16 RX ADMIN — OXYCODONE HYDROCHLORIDE 10 MG: 5 TABLET ORAL at 17:17

## 2023-10-16 RX ADMIN — Medication 10 ML: at 08:37

## 2023-10-16 RX ADMIN — APIXABAN 5 MG: 5 TABLET, FILM COATED ORAL at 08:36

## 2023-10-16 RX ADMIN — APIXABAN 5 MG: 5 TABLET, FILM COATED ORAL at 21:58

## 2023-10-16 RX ADMIN — OXYCODONE HYDROCHLORIDE 10 MG: 5 TABLET ORAL at 08:36

## 2023-10-16 RX ADMIN — ONDANSETRON 4 MG: 2 INJECTION INTRAMUSCULAR; INTRAVENOUS at 19:31

## 2023-10-16 RX ADMIN — PROPRANOLOL HYDROCHLORIDE 10 MG: 10 TABLET ORAL at 21:58

## 2023-10-16 RX ADMIN — CITALOPRAM 20 MG: 20 TABLET, FILM COATED ORAL at 21:58

## 2023-10-16 RX ADMIN — Medication 3 MG: at 21:58

## 2023-10-16 RX ADMIN — OXYCODONE HYDROCHLORIDE 5 MG: 5 TABLET ORAL at 21:59

## 2023-10-16 ASSESSMENT — PAIN DESCRIPTION - LOCATION
LOCATION: BACK
LOCATION: BACK
LOCATION: BACK;ABDOMEN

## 2023-10-16 ASSESSMENT — PAIN DESCRIPTION - DESCRIPTORS
DESCRIPTORS: DISCOMFORT;PRESSURE
DESCRIPTORS: DISCOMFORT;CRUSHING

## 2023-10-16 ASSESSMENT — PAIN SCALES - GENERAL
PAINLEVEL_OUTOF10: 7
PAINLEVEL_OUTOF10: 4
PAINLEVEL_OUTOF10: 8

## 2023-10-16 ASSESSMENT — PAIN DESCRIPTION - ORIENTATION
ORIENTATION: MID;RIGHT;LEFT
ORIENTATION: MID

## 2023-10-16 NOTE — CONSULTS
Patient: Luciano Oh  [id number]  Date: 10/16/2023      Chief Complaint: back pain    History of Present Illness/Hospital Course:  Negro Singh is an 80year old female with a past medical history significant for DVT on [facility] and HTN who presented to [facility] on 10/13/23 with back pain. Imaging revealed acute to subacute severe L1 compression fracture and multiple other old compression fractures. Neurosurgery was consulted and recommended TLSO when OOB. She continues to have functional deficits below her baseline. Today she is seen without family present. She reports continued pain in her back. She reports living in independent living at the [facility]. She is motivated to work with therapies and is interested in ARU. She request that we discuss rehab with her sons. has a past medical history of Acute deep vein thrombosis (DVT) of calf muscle vein of left lower extremity ([address]), Arthritis, Cataract, H/O colonoscopy, Hyperlipidemia, and Osteoporosis. has a past surgical history that includes Cataract removal; Colonoscopy; and hip surgery (Left, 5/14/2023). reports that she has never smoked. She has never used smokeless tobacco. She reports that she does not currently use alcohol. She reports that she does not use drugs. family history includes Heart Defect in her mother; Prostate Cancer in her father. REVIEW OF SYSTEMS:   CONSTITUTIONAL: negative for fevers, chills, diaphoresis, activity change, appetite change, fatigue, night sweats and unexpected weight change.    EYES: negative for blurred vision, eye discharge, visual disturbance and icterus  HEENT: negative for hearing loss, tinnitus, ear drainage, sinus pressure, nasal congestion, epistaxis and snoring  RESPIRATORY: Negative for hemoptysis, cough, sputum production  CARDIOVASCULAR: negative for chest pain, palpitations, exertional chest pressure/discomfort, edema, syncope  GASTROINTESTINAL: negative for nausea, vomiting, diarrhea, constipation,

## 2023-10-16 NOTE — PLAN OF CARE
Problem: Safety - Adult  Goal: Free from fall injury  10/16/2023 0925 by Henna Hernandez RN  Outcome: Progressing  Problem: Pain  Goal: Verbalizes/displays adequate comfort level or baseline comfort level  10/16/2023 0925 by Henna Hernandez RN  Outcome: Progressing  Flowsheets (Taken 10/16/2023 7945)  Verbalizes/displays adequate comfort level or baseline comfort level:   Encourage patient to monitor pain and request assistance   Assess pain using appropriate pain scale   Administer analgesics based on type and severity of pain and evaluate response

## 2023-10-16 NOTE — CARE COORDINATION
[facility] - Acute Rehab Unit   After review, this patient is felt to be:       [x]  Appropriate for Acute Inpatient Rehab    []  Appropriate for Acute Inpatient Rehab Pending Insurance Authorization    []  Not appropriate for Acute Inpatient Rehab    []  Referral received and ARU reviewing patient; Evaluation ongoing. Please notify ARU when pt is medically ready for d/c. CM, Kit notified. Thank you.    Matt Majano, [address] [facility]  Clinical Liaison

## 2023-10-16 NOTE — PLAN OF CARE
Problem: Pain  Goal: Verbalizes/displays adequate comfort level or baseline comfort level  10/15/2023 2008 by Thang Ortiz RN  Outcome: Progressing  Verbalizes/displays adequate comfort level or baseline comfort level:   Encourage patient to monitor pain and request assistance   Assess pain using appropriate pain scale   Administer analgesics based on type and severity of pain and evaluate response   Implement non-pharmacological measures as appropriate and evaluate response     Problem: Safety - Adult  Goal: Free from fall injury  10/15/2023 2008 by Thang Ortiz RN  Outcome: Progressing  Free From Fall Injury:   Instruct family/caregiver on patient safety   Based on caregiver fall risk screen, instruct family/caregiver to ask for assistance with transferring infant if caregiver noted to have fall risk factors     Problem: ABCDS Injury Assessment  Goal: Absence of physical injury  10/15/2023 2008 by Thang Ortiz RN  Outcome: Progressing  Absence of Physical Injury: Implement safety measures based on patient assessment

## 2023-10-16 NOTE — CARE COORDINATION
Chart reviewed day 2. IP yesterday. Spoke with patient. Feeling a little weak today. Therapy with recs for IPREHAB. Spoke with IM. Ok to discuss with patient. Yousuf Arrington ok with referral to ARU, this location. DONE waiting determination. Patricia Main, SOHAIL      Spoke with Florenceroel Oden. Son, Raj Celis would like call to discuss plan of care. Spoke with Raj Vimal. Discussed therapy recs for IPREHAB and the difference between that and SNF level of care. Agreeable to plan for referral to [address] [place] [address]. Patient from [address] [facility]. Spoke with ARU can accept patient when medically ready. Message left for son Raj Celis to update.  Patricia Main, RN

## 2023-10-17 ENCOUNTER — HOSPITAL ENCOUNTER (INPATIENT)
Age: 88
DRG: 560 | End: 2023-10-17
Attending: STUDENT IN AN ORGANIZED HEALTH CARE EDUCATION/TRAINING PROGRAM | Admitting: STUDENT IN AN ORGANIZED HEALTH CARE EDUCATION/TRAINING PROGRAM
Payer: MEDICARE

## 2023-10-17 VITALS
RESPIRATION RATE: 17 BRPM | SYSTOLIC BLOOD PRESSURE: 126 MMHG | BODY MASS INDEX: 19.41 KG/M2 | TEMPERATURE: 98.2 F | HEIGHT: 64 IN | WEIGHT: 113.7 LBS | DIASTOLIC BLOOD PRESSURE: 81 MMHG | OXYGEN SATURATION: 96 % | HEART RATE: 83 BPM

## 2023-10-17 DIAGNOSIS — S32.010A CLOSED COMPRESSION FRACTURE OF BODY OF L1 VERTEBRA (HCC): Primary | ICD-10-CM

## 2023-10-17 LAB — SARS-COV-2 RDRP RESP QL NAA+PROBE: NOT DETECTED

## 2023-10-17 PROCEDURE — 6370000000 HC RX 637 (ALT 250 FOR IP): Performed by: NURSE PRACTITIONER

## 2023-10-17 PROCEDURE — 94761 N-INVAS EAR/PLS OXIMETRY MLT: CPT

## 2023-10-17 PROCEDURE — 6370000000 HC RX 637 (ALT 250 FOR IP): Performed by: STUDENT IN AN ORGANIZED HEALTH CARE EDUCATION/TRAINING PROGRAM

## 2023-10-17 PROCEDURE — 2700000000 HC OXYGEN THERAPY PER DAY

## 2023-10-17 PROCEDURE — 6370000000 HC RX 637 (ALT 250 FOR IP): Performed by: INTERNAL MEDICINE

## 2023-10-17 PROCEDURE — 87635 SARS-COV-2 COVID-19 AMP PRB: CPT

## 2023-10-17 PROCEDURE — 1280000000 HC REHAB R&B

## 2023-10-17 RX ORDER — MAGNESIUM HYDROXIDE/ALUMINUM HYDROXICE/SIMETHICONE 120; 1200; 1200 MG/30ML; MG/30ML; MG/30ML
30 SUSPENSION ORAL EVERY 6 HOURS PRN
Status: CANCELLED | OUTPATIENT
Start: 2023-10-17

## 2023-10-17 RX ORDER — MAGNESIUM HYDROXIDE/ALUMINUM HYDROXICE/SIMETHICONE 120; 1200; 1200 MG/30ML; MG/30ML; MG/30ML
30 SUSPENSION ORAL EVERY 6 HOURS PRN
Status: DISCONTINUED | OUTPATIENT
Start: 2023-10-17 | End: 2023-10-28 | Stop reason: HOSPADM

## 2023-10-17 RX ORDER — OXYCODONE HYDROCHLORIDE 5 MG/1
10 TABLET ORAL EVERY 4 HOURS PRN
Status: DISCONTINUED | OUTPATIENT
Start: 2023-10-17 | End: 2023-10-28 | Stop reason: HOSPADM

## 2023-10-17 RX ORDER — PROPRANOLOL HYDROCHLORIDE 10 MG/1
10 TABLET ORAL 2 TIMES DAILY
Status: CANCELLED | OUTPATIENT
Start: 2023-10-17

## 2023-10-17 RX ORDER — OXYCODONE HYDROCHLORIDE 5 MG/1
5 TABLET ORAL EVERY 4 HOURS PRN
Status: DISCONTINUED | OUTPATIENT
Start: 2023-10-17 | End: 2023-10-28 | Stop reason: HOSPADM

## 2023-10-17 RX ORDER — ACETAMINOPHEN 325 MG/1
650 TABLET ORAL EVERY 6 HOURS PRN
Status: DISCONTINUED | OUTPATIENT
Start: 2023-10-17 | End: 2023-10-28 | Stop reason: HOSPADM

## 2023-10-17 RX ORDER — ACETAMINOPHEN 650 MG/1
650 SUPPOSITORY RECTAL EVERY 6 HOURS PRN
Status: CANCELLED | OUTPATIENT
Start: 2023-10-17

## 2023-10-17 RX ORDER — POLYETHYLENE GLYCOL 3350 17 G/17G
17 POWDER, FOR SOLUTION ORAL DAILY PRN
Status: CANCELLED | OUTPATIENT
Start: 2023-10-17

## 2023-10-17 RX ORDER — POLYETHYLENE GLYCOL 3350 17 G/17G
17 POWDER, FOR SOLUTION ORAL DAILY PRN
Status: DISCONTINUED | OUTPATIENT
Start: 2023-10-17 | End: 2023-10-28 | Stop reason: HOSPADM

## 2023-10-17 RX ORDER — ACETAMINOPHEN 325 MG/1
650 TABLET ORAL EVERY 6 HOURS PRN
Status: CANCELLED | OUTPATIENT
Start: 2023-10-17

## 2023-10-17 RX ORDER — LANOLIN ALCOHOL/MO/W.PET/CERES
3 CREAM (GRAM) TOPICAL NIGHTLY
Status: CANCELLED | OUTPATIENT
Start: 2023-10-17

## 2023-10-17 RX ORDER — ONDANSETRON 2 MG/ML
4 INJECTION INTRAMUSCULAR; INTRAVENOUS EVERY 6 HOURS PRN
Status: DISCONTINUED | OUTPATIENT
Start: 2023-10-17 | End: 2023-10-19

## 2023-10-17 RX ORDER — BISACODYL 10 MG
10 SUPPOSITORY, RECTAL RECTAL DAILY PRN
Status: CANCELLED | OUTPATIENT
Start: 2023-10-17

## 2023-10-17 RX ORDER — OXYCODONE HYDROCHLORIDE 5 MG/1
5 TABLET ORAL EVERY 4 HOURS PRN
Refills: 0 | Status: ON HOLD | OUTPATIENT
Start: 2023-10-17 | End: 2023-10-20

## 2023-10-17 RX ORDER — BISACODYL 10 MG
10 SUPPOSITORY, RECTAL RECTAL DAILY PRN
Status: DISCONTINUED | OUTPATIENT
Start: 2023-10-17 | End: 2023-10-28 | Stop reason: HOSPADM

## 2023-10-17 RX ORDER — OXYCODONE HYDROCHLORIDE 5 MG/1
5 TABLET ORAL EVERY 4 HOURS PRN
Status: CANCELLED | OUTPATIENT
Start: 2023-10-17

## 2023-10-17 RX ORDER — CITALOPRAM 20 MG/1
20 TABLET ORAL NIGHTLY
Status: DISCONTINUED | OUTPATIENT
Start: 2023-10-17 | End: 2023-10-28 | Stop reason: HOSPADM

## 2023-10-17 RX ORDER — LANOLIN ALCOHOL/MO/W.PET/CERES
3 CREAM (GRAM) TOPICAL NIGHTLY
Status: DISCONTINUED | OUTPATIENT
Start: 2023-10-17 | End: 2023-10-28 | Stop reason: HOSPADM

## 2023-10-17 RX ORDER — PROPRANOLOL HYDROCHLORIDE 10 MG/1
10 TABLET ORAL 2 TIMES DAILY
Status: DISCONTINUED | OUTPATIENT
Start: 2023-10-17 | End: 2023-10-28 | Stop reason: HOSPADM

## 2023-10-17 RX ORDER — OXYCODONE HYDROCHLORIDE 5 MG/1
10 TABLET ORAL EVERY 4 HOURS PRN
Status: CANCELLED | OUTPATIENT
Start: 2023-10-17

## 2023-10-17 RX ORDER — CITALOPRAM 20 MG/1
20 TABLET ORAL NIGHTLY
Status: CANCELLED | OUTPATIENT
Start: 2023-10-17

## 2023-10-17 RX ORDER — ONDANSETRON 4 MG/1
4 TABLET, ORALLY DISINTEGRATING ORAL EVERY 8 HOURS PRN
Status: DISCONTINUED | OUTPATIENT
Start: 2023-10-17 | End: 2023-10-28 | Stop reason: HOSPADM

## 2023-10-17 RX ORDER — ONDANSETRON 4 MG/1
4 TABLET, ORALLY DISINTEGRATING ORAL EVERY 8 HOURS PRN
Status: CANCELLED | OUTPATIENT
Start: 2023-10-17

## 2023-10-17 RX ORDER — ONDANSETRON 2 MG/ML
4 INJECTION INTRAMUSCULAR; INTRAVENOUS EVERY 6 HOURS PRN
Status: CANCELLED | OUTPATIENT
Start: 2023-10-17

## 2023-10-17 RX ORDER — ACETAMINOPHEN 650 MG/1
650 SUPPOSITORY RECTAL EVERY 6 HOURS PRN
Status: DISCONTINUED | OUTPATIENT
Start: 2023-10-17 | End: 2023-10-19

## 2023-10-17 RX ADMIN — PROPRANOLOL HYDROCHLORIDE 10 MG: 10 TABLET ORAL at 09:29

## 2023-10-17 RX ADMIN — CITALOPRAM 20 MG: 20 TABLET, FILM COATED ORAL at 20:59

## 2023-10-17 RX ADMIN — OXYCODONE HYDROCHLORIDE 10 MG: 5 TABLET ORAL at 12:32

## 2023-10-17 RX ADMIN — PROPRANOLOL HYDROCHLORIDE 10 MG: 10 TABLET ORAL at 20:58

## 2023-10-17 RX ADMIN — OXYCODONE HYDROCHLORIDE 10 MG: 5 TABLET ORAL at 16:42

## 2023-10-17 RX ADMIN — APIXABAN 5 MG: 5 TABLET, FILM COATED ORAL at 20:59

## 2023-10-17 RX ADMIN — Medication 3 MG: at 20:59

## 2023-10-17 RX ADMIN — OXYCODONE HYDROCHLORIDE 10 MG: 5 TABLET ORAL at 06:15

## 2023-10-17 RX ADMIN — APIXABAN 5 MG: 5 TABLET, FILM COATED ORAL at 09:29

## 2023-10-17 RX ADMIN — OXYCODONE HYDROCHLORIDE 10 MG: 5 TABLET ORAL at 20:58

## 2023-10-17 ASSESSMENT — PAIN DESCRIPTION - DESCRIPTORS
DESCRIPTORS: CRUSHING;DISCOMFORT;SHOOTING
DESCRIPTORS: DISCOMFORT

## 2023-10-17 ASSESSMENT — PAIN SCALES - GENERAL
PAINLEVEL_OUTOF10: 0
PAINLEVEL_OUTOF10: 7
PAINLEVEL_OUTOF10: 0
PAINLEVEL_OUTOF10: 8
PAINLEVEL_OUTOF10: 7
PAINLEVEL_OUTOF10: 7

## 2023-10-17 ASSESSMENT — LIFESTYLE VARIABLES: HOW OFTEN DO YOU HAVE A DRINK CONTAINING ALCOHOL: MONTHLY OR LESS

## 2023-10-17 ASSESSMENT — PAIN DESCRIPTION - ORIENTATION
ORIENTATION: MID
ORIENTATION: LEFT;MID

## 2023-10-17 ASSESSMENT — PAIN DESCRIPTION - LOCATION
LOCATION: BACK
LOCATION: BACK

## 2023-10-17 NOTE — PROGRESS NOTES
NURSING ASSESSMENT: [facility]     Rehab Dx/Hx: Closed compression fracture of body of L1 vertebra [facility]) Kenneth Putnam   [facility]:0/00/6521  [facility]:[id number]  Date of Admit: 10/17/2023  Room #: 4156/4050-56    Subjective:   Patient admitted to room 165 @ [phone] from C3 via wheelchair. Alert and oriented x4. Oriented to room and call light system. Oriented to rehab routine and therapy schedules. Informed about care conferences and ordering of meals with PCA. Drug / Medication Review:   Medications were reviewed by RN at time of admission  [x]  No potential or actual clinically significant medication issues were noted. []  Potential or actual clinically significant medication issues were found and MD was notified. (Specified below if applicable)   []  Allergy to medication   []  Drug interactions (drug/drug, drug/food, drug/disease interactions)   []  Duplicate drug   []  Omission (drug missing from prescribed regimen)   []  Non adherence   []  Adverse reaction   []  Wrong patient, drug, dose, route, time error   []  Ineffective drug therapy    Patient Mood Interview    Symptom Presence  0. No (enter 0 in column 2)  1. Yes (enter 0-3 in column 2)  9. No response (leave column 2 blank  Symptom Frequency  0. Never or 1 day  1. 2-6 days (several days)  2. 7-11 days (half or more days)  3. 12-14 days (nearly everyday) 1. Symptom Presence 2. Symptom Frequency    Enter scores in boxes   Little interest or pleasure in doing things   0 0   Feeling down, depressed, or hopeless   0 0   If either A or B is coded 2 or 3, CONTINUE asking the questions below. If not, END the interview.      Trouble falling or staying asleep, or sleeping too much       Feeling tired or having little energy       Poor appetite or overeating       Feeling bad about yourself - or that you are a failure or have let yourself or your family down       Trouble concentrating on things, such as reading the newspaper or watching television       Moving or speaking so slowly that other people could have noticed. Or the opposite- being so fidgety or restless that you have been moving around a lot more than usual.       Thoughts that you would be better off dead, or of hurting yourself in some way. Total Severity: Add scores for all frequency responses in column 2       Social isolation (from [employer])  How often do you feel lonely or isolated from those around you? [x] 0. Never  [] 1. Rarely  [] 2. Sometimes  [] 3. Often  [] 4. Always  [] 7. Patient declines to respond  [] 8. Patient unable to respond       Admission BIMS:12  Number of word repeated after first attempt:  []  0. None []  1. One []  2. Two [x]  3. Three    Able to report correct year:  []  0. Missed by >5 years, or no answer  []  1. Missed by 2-5 years  []  2. Missed by 1 year  [x]  3. Correct    Able to report correct month:   []  0. Missed by >1 month, or no answer  []  1. Missed by 6 days to 1 month  [x]  2. Accurate within 5 days    Able to report correct day of the week:  [x]  0. Incorrect or no answer  []  1. Correct    Recall:   Able to recall \"sock\":  []  0. No could not recall  []  1. Yes, after cueing  [x]  2. Yes, no cue required  Able to recall \"blue\":  [x]  0. No could not recall  []  1. Yes, after cueing  []  2. Yes, no cue required  Able to recall \"bed\":  []  0. No could not recall  []  1. Yes, after cueing  [x]  2. Yes, no cue required      4 Eyes Skin Assessment   The patient is being assessed for: Admission     I agree that 2 RN's have performed a thorough Head to Toe Skin Assessment on the patient. ALL assessment sites listed below have been assessed.        Areas assessed by both nurses:   [x]   Head, Face, and Ears   [x]   Shoulders, Back, and Chest, Abdomen  [x]   Arms, Elbows, and Hands   [x]   Coccyx, Sacrum, and Ischium  [x]   Legs, Feet, and Heel    Unstageable behind each ear     Does the Patient have Skin

## 2023-10-17 NOTE — PLAN OF CARE
Problem: Pain  Goal: Verbalizes/displays adequate comfort level or baseline comfort level  Outcome: Progressing  Flowsheets (Taken 10/16/2023 2337)  Verbalizes/displays adequate comfort level or baseline comfort level:   Encourage patient to monitor pain and request assistance   Assess pain using appropriate pain scale   Administer analgesics based on type and severity of pain and evaluate response     Problem: Safety - Adult  Goal: Free from fall injury  Outcome: Progressing  Flowsheets (Taken 10/16/2023 2337)  Free From Fall Injury:   Instruct family/caregiver on patient safety   Based on caregiver fall risk screen, instruct family/caregiver to ask for assistance with transferring infant if caregiver noted to have fall risk factors

## 2023-10-17 NOTE — DISCHARGE INSTR - COC
Continuity of Care Form    Patient Name: Dirk Grissom   :  1935  MRN:  [id number]    Admit date:  10/13/2023  Discharge date:  10/17/23    Code Status Order: Full Code   Advance Directives:     Admitting Physician:  Ximena Gibson MD  PCP: Letitia Rubin, APRN - CNP    Discharging Nurse: Alexandra Cole, [address] Unit/Room#: 3494/5579-65  Discharging Unit Phone Number: [phone]    Emergency Contact:   Extended Emergency Contact Information  Primary Emergency Contact: [employer] E[place] Linker of [address] Phone: [phone]  Work Phone: [phone]  Mobile Phone: [phone]  Relation: [address] [facility]  Secondary Emergency Contact: [address] [facility] Phone: [phone]  Relation: Child    Past Surgical History:  Past Surgical History:   Procedure Laterality Date    CATARACT REMOVAL      COLONOSCOPY      HIP SURGERY Left 2023    LEFT HIP OPEN REDUCTION INTERNAL FIXATION INTRAMEDULLARY NAILING performed by Michelle Ruggiero DO at [facility]       Immunization History:   Immunization History   Administered Date(s) Administered    COVID-19, PFIZER GRAY top, DO NOT Dilute, (age 15 y+), IM, 30 mcg/0.3 mL 2022    COVID-19, PFIZER PURPLE top, DILUTE for use, (age 15 y+), 30mcg/0.3mL 01/15/2021, 2021, 10/03/2021    DTaP 2010    Influenza 10/07/2006    Influenza Virus Vaccine 10/16/2003, 10/08/2005, 10/29/2007, 10/09/2008, 2009, 09/15/2010, 2011    Influenza, FLUAD, (age 72 y+), Adjuvanted, 0.5mL 2020, 2022, 10/12/2023    Influenza, FLUZONE (age 72 y+), High Dose, 0.7mL 10/03/2021    Influenza, High Dose (Fluzone 65 yrs and older) 10/10/2014, 2015, 10/14/2016, 10/06/2017, 10/05/2018    Influenza, Triv, inactivated, subunit, adjuvanted, IM (Fluad 65 yrs and older) 10/18/2019    Pneumococcal Conjugate 7-valent (Prevnar7) 03/10/2006    Pneumococcal, PCV-13, PREVNAR 13, (age 6w+), IM, 0.5mL 2015    Pneumococcal,

## 2023-10-17 NOTE — PROGRESS NOTES
Pt's verbal and written discharge instructions given, all questions answered. IV removed. Follow up appointments made and discussed. No new medications reviewed. Pt left in stable condition via wheelchair to ARU room 165 with all belongings. Report given to Keron.

## 2023-10-17 NOTE — PLAN OF CARE
Patient educated on safety measures to prevent a fall. Bed alarm is on, socks are on while out of bed, and call light within reach. Patient states she understands.  Iraj Cast RN

## 2023-10-17 NOTE — PLAN OF CARE
Problem: Pain  Goal: Verbalizes/displays adequate comfort level or baseline comfort level  10/17/2023 0732 by Shila Bell RN  Outcome: Progressing  Flowsheets (Taken 10/17/2023 0732)  Verbalizes/displays adequate comfort level or baseline comfort level:   Encourage patient to monitor pain and request assistance   Assess pain using appropriate pain scale   Administer analgesics based on type and severity of pain and evaluate response  Problem: Safety - Adult  Goal: Free from fall injury  10/17/2023 0732 by Shila Bell RN  Outcome: Progressing  Problem: ABCDS Injury Assessment  Goal: Absence of physical injury  Outcome: Progressing

## 2023-10-17 NOTE — PLAN OF CARE
ARU PATIENT TREATMENT PLAN  [address]  [phone]    Fior Espino    : 1935  Acct #: [de-identified]  MRN: [id number]   PHYSICIAN:  Tori Ramírez MD  Primary Problem    Patient Active Problem List   Diagnosis    Arthritis    Osteoporosis    Cataract    H/O colonoscopy    Hyperlipidemia    Vertigo    Macular degeneration    IT band syndrome    Compression fracture of body of thoracic vertebra (HCC)    Left hip pain    Closed nondisplaced intertrochanteric fracture of left femur (HCC)    Paroxysmal atrial fibrillation (HCC)    Acute deep vein thrombosis (DVT) of calf muscle vein of left lower extremity (HCC)    Low back pain    Compression fracture of L1 lumbar vertebra, sequela    Closed compression fracture of body of L1 vertebra ([facility])       Rehabilitation Diagnosis:     Closed compression fracture of body of L1 vertebra ([address]) [S32.010A]       ADMIT DATE:10/17/2023    Patient Goals: \"Be able to walk and go back to [facility]\"    Admitting Impairments: Pt. Admitted s/t L1 compression fracture resulting in Decreased functional mobility ; Decreased ADL status; Decreased ROM; Decreased strength;Decreased endurance;Decreased balance;Decreased coordination;Decreased posture  Barriers: level of assistance, endurance, pain, comorbidities   Participation: Good     CARE PLAN     NURSING:  Fior Srinivas while on this unit will:     [x] Be continent of bowel and bladder     [x] Have an adequate number of bowel movements  [] Urinate with no urinary retention >300ml in bladder  [] Complete bladder protocol with he removal  [x] Maintain O2 SATs at __90_%  [x] Have pain managed while on ARU       [] Be pain free by discharge   [x] Have no skin breakdown while on ARU  [] Have improved skin integrity via wound measurements  [] Have no signs/symptoms of infection at the wound site  [x] Be free from injury during hospitalization   [x] Complete education with

## 2023-10-17 NOTE — DISCHARGE SUMMARY
1.70*     No results for input(s): \"PROBNP\", \"TROPHS\" in the last 72 hours. No results for input(s): \"LABA1C\" in the last 72 hours. No results for input(s): \"AST\", \"ALT\", \"BILIDIR\", \"BILITOT\", \"ALKPHOS\" in the last 72 hours. No results for input(s): \"INR\", \"LACTA\", \"TSH\" in the last 72 hours.     Urine Cultures: No results found for: \"LABURIN\"  Blood Cultures: No results found for: \"BC\"  No results found for: \"BLOODCULT2\"  Organism: No results found for: \"ORG\"    Signed:    DEBBIE Hines - [facility]
Home Care Agency/Durable Medical Equipment Agency

## 2023-10-17 NOTE — PROGRESS NOTES
..4 Eyes Skin Assessment     The patient is being assess for  Admission    I agree that 2 RN's have performed a thorough Head to Toe Skin Assessment on the patient. ALL assessment sites listed below have been assessed. Areas assessed by both nurses: RN Brian/ SOHAIL Sharon  [x]   Head, Face, and Ears   [x]   Shoulders, Back, and Chest  [x]   Arms, Elbows, and Hands   [x]   Coccyx, Sacrum, and IschIum  [x]   Legs, Feet, and Heels        Does the Patient have Skin Breakdown?   No         Jorge Prevention initiated:  No   Wound Care Orders initiated:  No      WOC nurse consulted for Pressure Injury (Stage 3,4, Unstageable, DTI, NWPT, and Complex wounds), New and Established Ostomies:  No      Nurse 1 eSignature: Electronically signed by Isabella Lawson RN on 10/14/23 at 5:21 AM EDT    **SHARE this note so that the co-signing nurse is able to place an eSignature**    Nurse 2 eSignature: Electronically signed by Bradley Kuhn RN on 10/14/23 at 5:21 AM EDT
Hospital Medicine Progress Note      Date of Admission: 10/13/2023  Hospital Day: 3    Chief Admission Complaint:    Chief Complaint   Patient presents with    Back Pain     Patient had left hip surgery five months ago, went to PCP yesterday for swollen foot, x-rays not done. Complaining of lower back pain that has been going on for approximately 2 days. Subjective:      Patient resting when I arrive. She underwent MRI of spine today and had Ativan for comfort. She is easily aroused but falls back to sleep easily. She is still having back pain but it is tolerable at this time. PT/ OT recommend IP rehab. Presenting Admission History: This is a 80 y.o. female who resides at [facility] who presented to [facility] with with severe back pain that wrapped around into her abdomen. In the ED Xray of lumbar spine revealed  Age indeterminate severe compression deformity of L1, new compared to 2021 imaging. No significant change in the compression deformities of T12, L3 and L5. PMHx significant for recent dx of RLE DVT on Eliquis, HLD, Osteoporosis, L hip replacement, and Arthritis. Assessment/Plan:      Current Principal Problem:  Low back pain    Age in determinate L1 severe compression fracture with multiple other old compression fractures including T12. Neurosurgery consult; appreciate their input. POC: MRI of L spine to evaluate acuity of fx and neural elements. TLSO brace, and upright X-rays after brace arrives. - Ativan for MRI x 1  - MRI pending  - Continue current analgesic therapy  - PT/OT once brace arrives       DVT LLE gastrocnemius vein 10/6/2023  - Continue home brantley of Eliquis 5 mg BID  - POC per PCP is for 6 months of therapy     HTN well controlled  - Continue home medication of Inderal    Physical Exam Performed:      General appearance:  No apparent distress  Respiratory:  Normal respiratory effort. Cardiovascular:  Regular rate and rhythm.   Abdomen:
Hospital Medicine Progress Note      Date of Admission: 10/13/2023  Hospital Day: 4    Chief Admission Complaint:    Chief Complaint   Patient presents with    Back Pain     Patient had left hip surgery five months ago, went to PCP yesterday for swollen foot, x-rays not done. Complaining of lower back pain that has been going on for approximately 2 days. Subjective:      Patient is trying to call her son when I arrive. I ask her if I can try and the line is busy x 2. She states she is feeling okay with some back pain still but not as severe as when she came into hospital. I discuss with Juanjose Lopez RN that the patient is trying to call her son, Jessica Miramontes, and Juanjose Lopez states that he will call patient this afternoon. Relayed this to patient. Patient and I discuss possible inpt rehab and she would like CM to discuss with her 2 sons. Relayed this to CM>     Presenting Admission History: This is a 80 y.o. female who resides at [facility] who presented to [facility] with with severe back pain that wrapped around into her abdomen. In the ED Xray of lumbar spine revealed  Age indeterminate severe compression deformity of L1, new compared to 2021 imaging. No significant change in the compression deformities of T12, L3 and L5. PMHx significant for recent dx of RLE DVT on Eliquis, HLD, Osteoporosis, L hip replacement, and Arthritis. Assessment/Plan:      Current Principal Problem:  Low back pain    Age in determinate L1 severe compression fracture with multiple other old compression fractures including T12. Neurosurgery consult; appreciate their input. POC: MRI of L spine to evaluate acuity of fx and neural elements. TLSO brace, and upright X-rays after brace arrives. - MRI Lumbar spine:  Acute to subacute compression fracture of L1 with loss of 30% of vertebral height. Old compression fractures at T12, L3, and L5.  Moderate-sized left paracentral disc extrusion at T12-L1 which has
MRI unit called and they might do the MRI Thoracic spine later this evening. Patient received a dose of Ativan 1 mg prior to her MRI Lumbar spine earlier this morning and MRI staff asking if she might receive another dose of Ativan since the patient is known claustrophobic. Page Ms Baron Vergara, NP and ask to order another dose of Ativan 1 mg IV prior to MRI Thoracic Spine procedure. LORI-Dieter called back and asked to writer to reorder the Ativan 1 mg IV prior to her MRI. Called and updated MRI Dept.
Patient back from MRI procedure. Repositioned in bed comfortably and needs attended.
Pt AOx4, VSS, shift assessment completed and charted. Pt c/o 7/10 pain, PRN analgesic administered. Pt ambulating SBA. Back brace at bedside and used during ambulation. Pt denying further needs, and was in stable condition when this RN left the room. Bed is low, locked, alarmed, and call light/bedside table within reach. All care per orders.  Electronically signed by Ayden Love RN on 10/15/2023 at 4:13 AM
Pt AOx4, VSS, shift assessment completed and charted. Pt c/o 7/10 pain, PRN analgesic administered. Pt ambulating SBA. Back brace at bedside and used during ambulation. Pt denying further needs, and was in stable condition when this RN left the room. Bed is low, locked, alarmed, and call light/bedside table within reach. All care per orders.  Electronically signed by Sofy Díaz RN on 10/16/2023 at 2:06 AM
Pt assessment completed and charted. VSS, 02 86% RA, oxygen placed at 2L. Pt a/o. Bed in lowest position and wheels locked. Call light within reach. Bedside table within reach. Non-skid footwear in place. Pt denies any other needs at this time. Pt calls out appropriately.
Pt returned to room from MRI.
Received patient from ED transferred to bed 334. Patient is alert oriented x4, stable vital signs . Initial assessment done as charted. No reports of pain or SOB as of the moment. Kept bed in lowest position, call light and bedside table within reach. Instructed to call out for assistance.
Received patient in bed, alert and oriented/conversant. Vital signs taken and recorded. IV site patency assessed and without evidence of infiltration on her R Forearm - currently on saline locked. No signs of SOB - clear breath auscultated bilaterally. Currently on RA. Abdomen soft  and non distended; no reports of abdominal pain. Skin assessment done. Due medications given as scheduled. Bed wheels locked; Call light within reached; Raised siderails x2; on Fall precaution. Denies any need at the moment.
Received patient in bed, alert and oriented/conversant. Vital signs taken and recorded. IV site patency assessed and without evidence of infiltration. No signs of SOB - On RA. Abdomen soft  and non distended; no reports of abdominal pain. Skin assessment done. Due medications given as scheduled. PRN Oxycodone given for pain. Scheduled for MRI today at 9am.  Bed wheels locked; Call light within reached; Raised siderails x2; on Fall precaution. Denies any need at the moment.
Sequential  compression device placed as ordered. Repositioned patient to comfort, HOB elevated. Denies any need at the moment.
Shift assessment completed. Pt A&O x4, VSS. Pt reporting back pain 7/10 on the pain scale, PRN oral pain medication given per MAR. Pt a standby assist with a walker. Non skid socks on. Bed alarm active for safety. Bed locked and in lowest position. Call light and bedside table within reach. Will continue to monitor.
Shift assessment completed. Pt A&O x4, VSS. Pt reporting back pain has improved from 0600 dose given by nightshift RN. Non skid socks on. Bed alarm active for safety. Bed locked and in lowest position. Call light and bedside table within reach. Will continue to monitor.
Transport came and wheeled patient to MRI Dept via stretcher. 1 mg of Ativan IV given prior to MRI procedure.
Transport came from MRI Dept. .   Ativan 1 mg IV given as ordered prior to his procedure.
distress  ENT: Oral mucosa moist  Eyes: No discharge or injection  CV: extremities well perfused  Resp: No respiratory distress, on 1 L O2 NC  GI: Soft, nontender, nondistended. Neuro: Alert, oriented, appropriate. No cranial nerve deficits appreciated. Skin: No lesions or rashes noted. MSK: No joint abnormalities noted. Lab Results   Component Value Date    WBC 4.6 10/16/2023    HGB 12.1 10/16/2023    HCT 36.7 10/16/2023    MCV 90.0 10/16/2023     10/16/2023     Lab Results   Component Value Date    INR 1.04 04/22/2019    PROTIME 11.9 04/22/2019     Lab Results   Component Value Date    CREATININE <0.5 (L) 10/16/2023    BUN 13 10/16/2023     (L) 10/16/2023    K 3.5 10/16/2023    CL 97 (L) 10/16/2023    CO2 24 10/16/2023     Lab Results   Component Value Date    ALT 17 06/01/2022    AST 26 06/01/2022    ALKPHOS 79 06/01/2022    BILITOT 0.5 06/01/2022       Most recent echocardiogram revealed EF 55-60%. Most recent EKG revealed atrial fibrillation. IMAGING    MRI Thoracic and Lumbar Spine 10/15/23  Acute to subacute compression fracture of L1 with loss of 30% of vertebral height. Old compression fractures at T12, L3, and L5. Moderate-sized left paracentral disc extrusion at T12-L1 which has migrated inferiorly and compresses the left L1 nerve root within the lateral recess. Small right paracentral disc protrusion at L1-L2. Severe left foraminal stenosis at L2-L3. Grade 1 anterolisthesis at L5-S1. Severe right foraminal stenosis and right lateral recess stenosis are noted at this level. The left foramina is mildly stenotic. XR Lumbar Spine 10/14/23  1. Age indeterminate severe compression deformity of L1, new compared to 2021 imaging. 2.  No significant change in the compression deformities of T12, L3 and L5. Assessment:  1. Severe L1 compression fracture   2. DVT LLE on AC   3. HTN   4. Debility     Recommendations:  Plan to admit to rehab today.      Thank you for this
Inpatient CMS 0-100% Score: 53.32 (10/15/23 1032)  ADL Inpatient CMS G-Code Modifier : CK (10/15/23 1032)    Goals  Short Term Goals  Time Frame for Short Term Goals: within one week by 10/22/2023 unless otherwise noted  Short Term Goal 1: pt will complete toileting Amrit - 10/20/2023  Short Term Goal 2: pt will complete LB dressing SPV  Short Term Goal 3: pt will complete stance sink side ADLs SPV    Therapy Time   Individual Concurrent Group Co-treatment   Time In 0837         Time Out 0853         Minutes 16         Timed Code Treatment Minutes: 8 Minutes (8 min eval)    LIS Dooley/L
summary. Please see case management note for discharge disposition. Thank you.

## 2023-10-17 NOTE — PROGRESS NOTES
[facility] Admission Assessment  [address] [facility] Acute Rehab Unit    Patient:Marina Morales     Rehab Dx/Hx: Closed compression fracture of body of L1 vertebra ([address]) Jodee Espinoza   QQ:[id number]  GII:[id number]  Date of Admit: 10/17/2023     Pain Effect on Sleep:  Over the past 5 days, how much of the time has pain made it hard for you to sleep at night?  []  0. Does not apply - I have not had any pain or hurting in the past 5 days  []  1. Rarely or not at all  [x]  2. Occasionally  []  3. Frequently  []  4. Almost constantly  []  8. Unable to answer    Over the past 5 days, how often have you limited your participation in rehabilitation therapy sessions due to pain?  []  0. Does not apply - I have not received rehabilitation therapy in the past 5 days  [x]  1. Rarely or not at all  []  2. Occasionally  []  3. Frequently  []  4. Almost constantly  []  8. Unable to answer    Pain Interference with Day-to-Day Activities:  Over the past 5 days, how often have you limited your day-to-day activities (excluding rehabilitation therapy session)? [x]  1. Rarely or not at all  []  2. Occasionally  []  3. Frequently  []  4. Almost constantly  []  8. Unable to answer      High-Risk Drug Classes: Use and Indication   Is taking: Check if the pt is taking any medications by pharmacological classification  Indication noted: If column 1 is checked, check if there is an indication noted for all meds in the drug class Is taking  (check all that apply) Indication noted (check all that apply)   Antipsychotic [] []   Anticoagulant [x] [x]   Antibiotic [] []   Opioid [x] [x]   Antiplatelet [] []   Hypoglycemic (including insulin) [] []   None of the above [] []     Special Treatments, Procedures, and Programs   Check all of the following treatments, procedures, and programs that apply on admission. On admission (check all that apply)   Cancer Treatments    A1. Chemotherapy []   A2. IV []   A3. Oral []   A10.  Other []   B1. Radiation []

## 2023-10-17 NOTE — CARE COORDINATION
[facility] - Acute Rehab Unit   Left  for Kit. ARU can accept patient today at 4pm. Please order rapid COVID. Thank you.    Isabel Mehta, [address] [facility]  Clinical Liaison

## 2023-10-17 NOTE — CONSULTS
Consult Call Back    Who:Saloni Machado MD  Date:10/17/2023,  Time:7:40 AM    Electronically signed by Jeffy Sanchez on 10/17/23 at 7:40 AM EDT

## 2023-10-17 NOTE — CARE COORDINATION
CASE MANAGEMENT DISCHARGE SUMMARY      Discharge to: ARU      IMM given: 10/16/23    New Durable Medical Equipment ordered/agency: none    Transportation: in house     Confirmed discharge plan with:     Patient: yes     Family:  yes message left for son     Facility/Agency, name:  [facility] faxed ARU   Phone number for report to facility: [phone]     RN, name: Mary castilloid pending. Note: Discharging nurse to complete [facility], reconcile [facility], and place final copy with patient's discharge packet. RN to ensure that written prescriptions for  Level II medications are sent with patient to the facility as per protocol.      Jd Macias RN

## 2023-10-18 LAB
ANION GAP SERPL CALCULATED.3IONS-SCNC: 7 MMOL/L (ref 3–16)
BASOPHILS # BLD: 0 K/UL (ref 0–0.2)
BASOPHILS NFR BLD: 1.1 %
BUN SERPL-MCNC: 14 MG/DL (ref 7–20)
CALCIUM SERPL-MCNC: 9.3 MG/DL (ref 8.3–10.6)
CHLORIDE SERPL-SCNC: 97 MMOL/L (ref 99–110)
CO2 SERPL-SCNC: 32 MMOL/L (ref 21–32)
CREAT SERPL-MCNC: <0.5 MG/DL (ref 0.6–1.2)
DEPRECATED RDW RBC AUTO: 17.1 % (ref 12.4–15.4)
EOSINOPHIL # BLD: 0.2 K/UL (ref 0–0.6)
EOSINOPHIL NFR BLD: 4.6 %
GFR SERPLBLD CREATININE-BSD FMLA CKD-EPI: >60 ML/MIN/{1.73_M2}
GLUCOSE SERPL-MCNC: 109 MG/DL (ref 70–99)
HCT VFR BLD AUTO: 37.4 % (ref 36–48)
HGB BLD-MCNC: 12.3 G/DL (ref 12–16)
LYMPHOCYTES # BLD: 1.4 K/UL (ref 1–5.1)
LYMPHOCYTES NFR BLD: 34.6 %
MCH RBC QN AUTO: 29.2 PG (ref 26–34)
MCHC RBC AUTO-ENTMCNC: 32.9 G/DL (ref 31–36)
MCV RBC AUTO: 88.6 FL (ref 80–100)
MONOCYTES # BLD: 0.8 K/UL (ref 0–1.3)
MONOCYTES NFR BLD: 19.2 %
NEUTROPHILS # BLD: 1.6 K/UL (ref 1.7–7.7)
NEUTROPHILS NFR BLD: 40.5 %
PLATELET # BLD AUTO: 195 K/UL (ref 135–450)
PMV BLD AUTO: 8.2 FL (ref 5–10.5)
POTASSIUM SERPL-SCNC: 4.6 MMOL/L (ref 3.5–5.1)
PREALB SERPL-MCNC: 12.1 MG/DL (ref 20–40)
RBC # BLD AUTO: 4.22 M/UL (ref 4–5.2)
SODIUM SERPL-SCNC: 136 MMOL/L (ref 136–145)
WBC # BLD AUTO: 3.9 K/UL (ref 4–11)

## 2023-10-18 PROCEDURE — 84134 ASSAY OF PREALBUMIN: CPT

## 2023-10-18 PROCEDURE — 97535 SELF CARE MNGMENT TRAINING: CPT

## 2023-10-18 PROCEDURE — 97116 GAIT TRAINING THERAPY: CPT

## 2023-10-18 PROCEDURE — 97167 OT EVAL HIGH COMPLEX 60 MIN: CPT

## 2023-10-18 PROCEDURE — 6370000000 HC RX 637 (ALT 250 FOR IP): Performed by: STUDENT IN AN ORGANIZED HEALTH CARE EDUCATION/TRAINING PROGRAM

## 2023-10-18 PROCEDURE — 85025 COMPLETE CBC W/AUTO DIFF WBC: CPT

## 2023-10-18 PROCEDURE — 1280000000 HC REHAB R&B

## 2023-10-18 PROCEDURE — 97530 THERAPEUTIC ACTIVITIES: CPT

## 2023-10-18 PROCEDURE — 97110 THERAPEUTIC EXERCISES: CPT

## 2023-10-18 PROCEDURE — 97162 PT EVAL MOD COMPLEX 30 MIN: CPT

## 2023-10-18 PROCEDURE — 36415 COLL VENOUS BLD VENIPUNCTURE: CPT

## 2023-10-18 PROCEDURE — 80048 BASIC METABOLIC PNL TOTAL CA: CPT

## 2023-10-18 RX ADMIN — OXYCODONE HYDROCHLORIDE 5 MG: 5 TABLET ORAL at 10:10

## 2023-10-18 RX ADMIN — PROPRANOLOL HYDROCHLORIDE 10 MG: 10 TABLET ORAL at 20:05

## 2023-10-18 RX ADMIN — Medication 3 MG: at 20:05

## 2023-10-18 RX ADMIN — OXYCODONE HYDROCHLORIDE 10 MG: 5 TABLET ORAL at 20:05

## 2023-10-18 RX ADMIN — CITALOPRAM 20 MG: 20 TABLET, FILM COATED ORAL at 20:05

## 2023-10-18 RX ADMIN — PROPRANOLOL HYDROCHLORIDE 10 MG: 10 TABLET ORAL at 10:11

## 2023-10-18 RX ADMIN — APIXABAN 5 MG: 5 TABLET, FILM COATED ORAL at 10:11

## 2023-10-18 RX ADMIN — APIXABAN 5 MG: 5 TABLET, FILM COATED ORAL at 20:05

## 2023-10-18 ASSESSMENT — PAIN SCALES - GENERAL
PAINLEVEL_OUTOF10: 7
PAINLEVEL_OUTOF10: 7
PAINLEVEL_OUTOF10: 2

## 2023-10-18 ASSESSMENT — PAIN DESCRIPTION - LOCATION
LOCATION: BACK
LOCATION: BACK

## 2023-10-18 NOTE — PROGRESS NOTES
will perform toileting task and transfer with mod I  Long Term Goal 4: Pt will perform simple IADL task with SPV  Long Term Goal 5: Pt will verbalize spinal precautions with 0 verbal cues    Assessment  Performance deficits / Impairments: Decreased functional mobility ; Decreased ADL status; Decreased ROM; Decreased strength;Decreased endurance;Decreased balance;Decreased coordination;Decreased posture  Assessment: Per Dr. Karina Gilliam, on 10/17/23, Negro Singh is an 80year old female with a past medical history significant for DVT on [facility] and HTN who presented to [facility] on 10/13/23 with back pain. Imaging revealed acute to subacute severe L1 compression fracture and multiple other old compression fractures. Neurosurgery was consulted and recommended TLSO when OOB. She continues to have functional deficits below her baseline. \"  PTA pt living at the [facility] in [address] [place] [address]. Pt reports she is typically IND with ADLs and light IADL tasks. At [facility], pt requiring CGA for oral hygiene, SPV for UB spongebathing, mod A LB ADLs, dep footwear, mod A UB dressing, and min A for toileting. After evaluation, pt found to be presenting with the above mentioned occupational performance deficits which are affecting participation in daily living skills. Pt would benefit from continued skilled occupational therapy to address ADLs, functional mobility, and safety while in ARU. Prognosis: Good  Decision Making: High Complexity  Discharge Recommendations: 24 hour supervision or assist;Home with Home health OT  Occupational Therapy Plan  Times Per Week: 5-7/week  Current Treatment Recommendations: Strengthening;ROM;Balance training;Functional mobility training; Endurance training;Gait training; Safety education & training;Patient/Caregiver education & training;Equipment evaluation, education, & procurement;Positioning;Self-Care / ADL; Home management training;Pain management       Therapy Time   Individual Concurrent Group Co-treatment   Time In

## 2023-10-18 NOTE — CONSULTS
[facility] Continence Nurse  Consult Note       NAME:  Esvin Da Silva  MEDICAL RECORD NUMBER:  [id number]  AGE: 80 y.o. GENDER: female  : 1935  TODAY'S DATE:  10/18/2023    Subjective; I believe my glasses rubbed it. Reason for WOCN Evaluation and Assessment: pressure injuries on back of ears related to glasses      Esvin Da Silva is a 80 y.o. female referred by:   [] Physician  [x] Nursing  [] Other:     Wound Identification:  Wound Type: pressure  Contributing Factors: chronic pressure from glasses    Wound History: pressure from glasses   Current Wound Care Treatment:  GRAHAM    Patient Goal of Care:  [x] Wound Healing  [] Odor Control  [] Palliative Care  [x] Pain Control   [] Other:         PAST MEDICAL HISTORY        Diagnosis Date    Acute deep vein thrombosis (DVT) of calf muscle vein of left lower extremity ([address]) 10/12/2023    Arthritis     Cataract     H/O colonoscopy     Hyperlipidemia     Osteoporosis        PAST SURGICAL HISTORY    Past Surgical History:   Procedure Laterality Date    CATARACT REMOVAL      COLONOSCOPY      HIP SURGERY Left 2023    LEFT HIP OPEN REDUCTION INTERNAL FIXATION INTRAMEDULLARY NAILING performed by Dorisann Gosselin, DO at [facility]    Family History   Problem Relation Age of Onset    Heart Defect Mother     Prostate Cancer Father        SOCIAL HISTORY    Social History     Tobacco Use    Smoking status: Never    Smokeless tobacco: Never   Vaping Use    Vaping Use: Never used   Substance Use Topics    Alcohol use: Not Currently     Comment: occasional    Drug use: No       ALLERGIES    Allergies   Allergen Reactions    Morphine Hallucinations    Tramadol      Hypersomnia       MEDICATIONS    No current facility-administered medications on file prior to encounter.      Current Outpatient Medications on File Prior to Encounter   Medication Sig Dispense Refill    oxyCODONE (ROXICODONE) 5 MG immediate release tablet Take 1 tablet by mouth every 4

## 2023-10-18 NOTE — PROGRESS NOTES
Physical Therapy  Facility/Department: [facility] ARU  Rehabilitation Physical Therapy Initial Assessment    NAME: Jose Carter  : 1935 (80 y.o.)  MRN: [id number]  CODE STATUS: Full Code    Date of Service: 10/18/23      Past Medical History:   Diagnosis Date    Acute deep vein thrombosis (DVT) of calf muscle vein of left lower extremity ([address]) 10/12/2023    Arthritis     Cataract     H/O colonoscopy     Hyperlipidemia     Osteoporosis      Past Surgical History:   Procedure Laterality Date    CATARACT REMOVAL      COLONOSCOPY      HIP SURGERY Left 2023    LEFT HIP OPEN REDUCTION INTERNAL FIXATION INTRAMEDULLARY NAILING performed by Shilpa Donahue DO at [facility] OR       Chart Reviewed: Yes  Patient assessed for rehabilitation services?: Yes  Additional Pertinent Hx: Per Dr. Colonel Miramontes H&P 818 \"80 year old female with a past medical history significant for DVT on [facility] and HTN who presented to [facility] on 10/13/23 with back pain. Imaging revealed acute to subacute severe L1 compression fracture and multiple other old compression fractures. Neurosurgery was consulted and recommended TLSO when OOB\"  Family / Caregiver Present: No  Referring Practitioner: Juvenal Estes MD  Referral Date : 10/17/23  Diagnosis: L1 compression fx  General Comment  Comments: RN cleared pt for session    Restrictions:  Restrictions/Precautions: Fall Risk;General Precautions; Up as Tolerated;ROM Restrictions  Required Braces or Orthoses  Spinal: Thoracic Lumbar Sacral Orthotics  Spinal Other: OOB, okay to don/doff at EOB and remove for hygiene  Position Activity Restriction  Spinal Precautions: No Bending; No Lifting; No Twisting  Other position/activity restrictions: TLSO when OOB, log rolling     SUBJECTIVE  Subjective: Pt supine in bed on approach, pleasant and agreeable to PT to evaluation  Pain: Pt reports 7/10 pain in back      Prior Level of Function:  Social/Functional History  Lives With: Alone  Type of Home:  ([facility]

## 2023-10-18 NOTE — CARE COORDINATION
Case Management ARU Admission [address] Dx/Hx: Closed compression fracture of body of L1 vertebra ([address]) Betty Dumont   FIN:5/43/[birth year]67  FQW:[id number]  Date of Admit: 10/17/2023  Room #: 6541/4216-50       Objective:  met with the patient to complete initial assessment and review the role of Case Management while on the ARU. Patient educated on team conferences. Discussed family training with the patient/family on how it is encouraged on the unit. Order for \"discharge planning\" has been addressed. Family Present: no   Primary : [facility] Decision Maker:   Primary Decision Maker: Eliseo Rosenberg - Healthcare Decision Maker - [phone]    Primary Decision Maker: [facility] - [phone]    Supplemental (Other) Decision Maker: Kingsley Min  Child - [phone]   Current PCP:  DEBBIE Oleary CNP       Admit date:  10/17/2023   Insurance: P:MEDICARE PART A AND B  S:[employer]   Precert required for SNF:  [x] No       [] Yes   3 Night stay required: [] No       [x] Yes   Admitting diagnosis: Closed compression fracture of body of L1 vertebra ([address]) [S32.010A]       Current home situation: Independent PLOF. Lives at [facility]. One level. Elevator access. DME at home: [x] Walker>4 wheeled     [] Stephanie Phenes       [] RTS        [] [facility]      [] Shower Chair        [] O2       [] HHN     [] CPAP       [] BiPap      [] Hospital Bed       [x] W/C        [x] Other: alert button, shoehorn, reacher, sock aide   Medical concerns requiring training: Restrictions  Restrictions/Precautions: Fall Risk;General Precautions; Up as Tolerated  Other position/activity restrictions: TLSO when OOB, log rolling, don TLSO in bed  Required Braces or Orthoses  Spinal: Thoracic Lumbar Sacral Orthotics  Required Braces or Orthoses?: Yes   Medication concerns:  Require financial assistance with meds? [x] No       [] If yes, please explain:   [] Yes              Services prior to admission: none   Preference for [facility] or OP Therapy: [] Home health       [] Outpatient       [x] No preference   Patient's goal(s): Go home   Working or volunteering PTA? retired       Transportation needs:    Has lack of transportation kept you from medical appointments, meetings, work, or ADL's? [] Yes, it has kept me from medical appointments or from getting my meds  [] Yes, It has kept me from non-medical meetings, appointments, work, or getting things I need  [x] No  [] Pt unable to respond  [] Pt declines to respond     How often do you need to have someone help you when you read instructions, pamphlets, or other written material from your doctor or pharmacy? [] Never                             [] Always  [x] Rarely                            [] Patient unable to respond  [] Sometimes                     [] Pt declines to respond  [] Often   Active with: [] Senior services        [] [place] on aging         [] Other:         Dialysis Facility (if applicable) Name:  Address:  Dialysis Schedule:  Phone:  Fax:       Support system at home and in the community: Family, [facility] [place]   Caregiver physical ability: [x] Cue patient for safety  [] Physical lift/lower: [] Light [] Moderate [] Heavy  [x] Charolett Bolster / Clean  [x] Transport   Who will be the one to contact for family training: sebastien   24 hour care on discharge?: yes   What level does the patient need to be at to return home: Mod I   Discharge plan:  24 hour supervision or assist;Home with Home health    Barriers to discharge: Patient progress, WB status       Ethnicity: Are you of , /a, or [place] origin?   [x] No, not of , /a, or [place] origin  [] Yes, [place], [facility] Mcgregor/dylan  [] Yes, [place]  [] Yes, [place]  [] Yes, another , , or [place] origin  [] Pt unable to respond  [] Pt declines to respond     Race: [x] [employer]

## 2023-10-18 NOTE — CONSULTS
Comprehensive Nutrition Assessment    Type and Reason for Visit:  Initial, Consult, Wound    Nutrition Recommendations/Plan:   Liberalize diet to regular   Add Ensure BID and Magic Cups daily - monitor acceptance   Encourage PO intakes as tolerated   Monitor nutrition adequacy, pertinent labs, bowel habits, wt changes, and clinical progress     Malnutrition Assessment:  Malnutrition Status: At risk for malnutrition (Comment) (10/18/23 1151)      Nutrition Assessment:    New ARU pt admitted with L1 compression fracture. Hx of DVT, HLD and osteoporosis. Consulted for oral nutrition supplements. Pt nutritionally compromised AEB wounds and weight loss. Pt unavailable via phone this date. PO intakes of 26-75% per EMR. Weights trending down over past 5 days. No BM in several days, no bowel regimen ordered. Recommend liberalizing diet and adding ONS to promote PO and protein intakes for wound healing and minimize further weight loss. Will monitor further nutritional needs. Nutrition Related Findings:    Active BS. Last BM on 10/10. No bowel regimen. Lytes WNL. Wound Type: Pressure Injury, Stage I, Unstageable       Current Nutrition Intake & Therapies:    Average Meal Intake: 26-50%, 51-75%  Average Supplements Intake: None Ordered  ADULT DIET; Regular  ADULT ORAL NUTRITION SUPPLEMENT; Breakfast, Dinner; Standard High Calorie/High Protein Oral Supplement  ADULT ORAL NUTRITION SUPPLEMENT; Lunch; Frozen Oral Supplement    Anthropometric Measures:  Height: 162.6 cm (5' 4\")  Ideal Body Weight (IBW): 120 lbs (55 kg)       Current Body Weight: 49 kg (108 lb), 90 % IBW.  Weight Source: Bed Scale  Current BMI (kg/m2): 18.5  Usual Body Weight: 51.7 kg (114 lb) (standing scale 10/14/23)  % Weight Change (Calculated): -5.3                    BMI Categories: Underweight (BMI less than 22) age over 72    Estimated Daily Nutrient Needs:  Energy Requirements Based On: Kcal/kg (25-30)  Weight Used for Energy Requirements: Ideal  Energy

## 2023-10-19 PROCEDURE — 97530 THERAPEUTIC ACTIVITIES: CPT

## 2023-10-19 PROCEDURE — 97116 GAIT TRAINING THERAPY: CPT

## 2023-10-19 PROCEDURE — 97110 THERAPEUTIC EXERCISES: CPT

## 2023-10-19 PROCEDURE — 6370000000 HC RX 637 (ALT 250 FOR IP): Performed by: STUDENT IN AN ORGANIZED HEALTH CARE EDUCATION/TRAINING PROGRAM

## 2023-10-19 PROCEDURE — 1280000000 HC REHAB R&B

## 2023-10-19 PROCEDURE — 92523 SPEECH SOUND LANG COMPREHEN: CPT

## 2023-10-19 PROCEDURE — 97535 SELF CARE MNGMENT TRAINING: CPT

## 2023-10-19 RX ORDER — ACETAMINOPHEN 500 MG
1000 TABLET ORAL EVERY 8 HOURS SCHEDULED
Status: DISCONTINUED | OUTPATIENT
Start: 2023-10-19 | End: 2023-10-28 | Stop reason: HOSPADM

## 2023-10-19 RX ADMIN — APIXABAN 5 MG: 5 TABLET, FILM COATED ORAL at 21:38

## 2023-10-19 RX ADMIN — PROPRANOLOL HYDROCHLORIDE 10 MG: 10 TABLET ORAL at 21:38

## 2023-10-19 RX ADMIN — APIXABAN 5 MG: 5 TABLET, FILM COATED ORAL at 08:19

## 2023-10-19 RX ADMIN — Medication 3 MG: at 21:38

## 2023-10-19 RX ADMIN — ACETAMINOPHEN 1000 MG: 500 TABLET ORAL at 14:43

## 2023-10-19 RX ADMIN — ACETAMINOPHEN 1000 MG: 500 TABLET ORAL at 21:38

## 2023-10-19 RX ADMIN — PROPRANOLOL HYDROCHLORIDE 10 MG: 10 TABLET ORAL at 08:19

## 2023-10-19 RX ADMIN — CITALOPRAM 20 MG: 20 TABLET, FILM COATED ORAL at 21:38

## 2023-10-19 RX ADMIN — OXYCODONE HYDROCHLORIDE 5 MG: 5 TABLET ORAL at 08:23

## 2023-10-19 RX ADMIN — POLYETHYLENE GLYCOL 3350 17 G: 17 POWDER, FOR SOLUTION ORAL at 14:42

## 2023-10-19 RX ADMIN — OXYCODONE HYDROCHLORIDE 10 MG: 5 TABLET ORAL at 21:38

## 2023-10-19 ASSESSMENT — PAIN DESCRIPTION - LOCATION
LOCATION: BACK
LOCATION: BACK

## 2023-10-19 ASSESSMENT — PAIN SCALES - GENERAL
PAINLEVEL_OUTOF10: 8

## 2023-10-19 ASSESSMENT — PAIN DESCRIPTION - ORIENTATION: ORIENTATION: RIGHT

## 2023-10-19 ASSESSMENT — PAIN - FUNCTIONAL ASSESSMENT: PAIN_FUNCTIONAL_ASSESSMENT: PREVENTS OR INTERFERES SOME ACTIVE ACTIVITIES AND ADLS

## 2023-10-19 ASSESSMENT — PAIN DESCRIPTION - DESCRIPTORS: DESCRIPTORS: ACHING

## 2023-10-19 NOTE — PROGRESS NOTES
10/19/23 4990   Encounter Summary   Encounter Overview/Reason  Attempted Encounter  (Patient was sleeping / resting)   Last Encounter  10/19/23  (Ishaan Gallardo left a card and said a silent prayer)   Assessment/Intervention/Outcome   Intervention Prayer (assurance of)/[place]

## 2023-10-19 NOTE — PROGRESS NOTES
Physical Therapy  Facility/Department: [facility]  Rehabilitation Physical Therapy Treatment Note    NAME: Dirk Grissom  : 1935 (80 y.o.)  MRN: [id number]  CODE STATUS: Full Code    Date of Service: 10/19/23       Restrictions:  Restrictions/Precautions: Fall Risk, General Precautions, Up as Tolerated, ROM Restrictions  Required Braces or Orthoses  Spinal: Thoracic Lumbar Sacral Orthotics  Spinal Other: OOB, okay to don/doff at EOB and remove for hygiene  Position Activity Restriction  Spinal Precautions: No Bending, No Lifting, No Twisting  Other position/activity restrictions: TLSO when OOB, log rolling     SUBJECTIVE  Subjective  Subjective: pt found in bed, RN present providing pain medication  Pain: 8/10 pain low back          OBJECTIVE  Cognition  Overall Cognitive Status: Exceptions  Arousal/Alertness: Appropriate responses to stimuli  Following Commands: Follows one step commands with increased time; Follows one step commands with repetition  Attention Span: Appears intact  Memory: Decreased short term memory  Safety Judgement: Decreased awareness of need for assistance  Problem Solving: Able to problem solve independently;Assistance required to generate solutions;Assistance required to identify errors made  Insights: Decreased awareness of deficits  Initiation: Requires cues for some  Sequencing: Requires cues for some  Orientation  Overall Orientation Status: Impaired  Orientation Level: Oriented to person;Oriented to place;Oriented to time;Disoriented to situation    Functional Mobility  Supine to Sit  Assistance Level: Stand by assist  Skilled Clinical Factors: with max VC for log rolling and HOB minimally elevated with bed rail  Balance  Sitting Balance: Stand by assistance  Standing Balance: Contact guard assistance  Standing Balance  Activity: CGA for static standing balance while donning pants  Sit to Stand  Assistance Level: Contact guard assist  Skilled Clinical Factors: from EOB to Rw and wc

## 2023-10-19 NOTE — PLAN OF CARE
Problem: Safety - Adult  Goal: Free from fall injury  Outcome: Progressing     Problem: Pain  Goal: Verbalizes/displays adequate comfort level or baseline comfort level  Outcome: Progressing     Problem: ABCDS Injury Assessment  Goal: Absence of physical injury  Outcome: Progressing     Problem: Skin/Tissue Integrity  Goal: Absence of new skin breakdown  Description:  Monitor for areas of redness and/or skin breakdown  Outcome: Progressing     Problem: Nutrition Deficit:  Goal: Optimize nutritional status  Outcome: Progressing

## 2023-10-19 NOTE — PROGRESS NOTES
ADDENDUM TO ARU PATIENT TREATMENT PLAN  [facility]    Patient Name: Don Morrison        MRN: [id number]    : 1935  (80 y.o.)  Date: 10/19/2023     The ARU Patient Treatment Plan for Don Morrison has been updated as of 10/19/2023 to reflect the following changes:  Physical Therapy: 90 minutes, Decreasing to 60 minutes 5 days/week due to SLP services for 30 min. Occupational Therapy: 90 minutes, remaining at 90 minutes 5 days/week   Speech Therapy: Add 30 minutes 5 days/week due to cognitive deficits     Patient, MD, and treatment team all aware and in agreement.       Signature:  Benny Valdez PT, DPT  Micha Marc OTR/L  Savi Zhou MA CCC-SLP   Electronically signed by Katherine Egan MD on 10/20/2023 at 10:28 AM

## 2023-10-19 NOTE — THERAPY EVALUATION
plan for activities within her facility. She does not drive or cook. She did manage her laundry and light cleaning. The pt was administered the [facility]) Examination this date to assess cognition. The pt scored a 15/30 with a score of 27 or greater considered WNL per the SLUMS. See pt's scores on each subtest below. This score is judged to be a moderate cognitive deficit when considering prior level of independence and level of function which includes inability to read due to low vision unless very large and bold print. Areas of strength include effort to use compensatory strategies to orient herself. Areas of deficit include immediate, short-term and recent memory, problem solving for similarities, orientation, mental manipulation. Pt may benefit from continued speech therapy to promote improvement in these areas and achieve safe and independent return home at [facility] if safe and able. Discussed areas of deficit, goals, POC for ST with pt who is in agreement for participation in therapy. Goals:  Long Term Goals:   Time Frame for Long Term Goals: 8 days 10/26/23  Goal 1: Pt will improve cognitive communication skills for safe return to prior living environment with prior support    Short Term Goals:  Time Frame for Short Term Goals: 5 days 10/24/23  Goal 1: Pt will use strategies to complete graded recall tasks (daily events, her room number, 2-3 element short-term, simple mental manipulation) with 70% acc  Goal 2: Pt will demonstrate functional problem solving for ADLs 90%  Goal 3: Pt will use strategies for orientation X4 with min cues    Objective:   Cranial nerve / Oral motor exam:   CN V (trigeminal): ophthalmic, maxillary, and mandibular facial sensation- WNL  CN VII (facial): WNL  CN IX/X (glossopharyngeal/vagus): MPT: not tested; pitch range: not tested; vocal quality: WNL; cough: Strong-perceptually  CN XII (hypoglossal):  WNL    Dentition: natural    Motor Speech: Rationale of eval, Results of eval, Goals, and POC  Patient Education Responses: pt verbalized understanding      Total Treatment Time / Charges       Time in Time out Total Time / units   Speech / David Devlin / Cog Eval:  1230 1330 60 min/ 1 unit eval     Signature: Mavis Atkinson MS CCC-SLP  Speech Language Pathologist

## 2023-10-19 NOTE — PLAN OF CARE
Cognitive-Communication Evaluation completed. See note for detail. Jonatan Atkinson MS CCC-SLP  Speech Language Pathologist

## 2023-10-19 NOTE — PROGRESS NOTES
TLSO  Doffed socks using reacher, donned using sock aid, Min cues for technique and to maintain precautions    Functional Mobility  Multiple sit to stands with CGA-SBA using RW  Pt ambulated therapy gym>room with CGA-SBA using RW    Therex  BUE therex for increased UB strength required for ADLs using 1# free weight: sup/pro, bicep curls, shoulder flexion, seated chest press, x10-15 reps each    Pt educated on spinal precautions, doffing/donning TLSO, and AE usage for increased IND with ADLs. Demo'd understanding of education provided but would benefit from continued education. Continue POC. Patient Education  Education  Education Given To: Patient  Education Provided: Role of Therapy;Precautions;Transfer Training;Energy Conservation  Education Provided Comments: spinal pxs, role of OT, safe transfer techniques  Education Method: Verbal  Barriers to Learning: Cognition  Education Outcome: Continued education needed;Verbalized understanding    Plan  Occupational Therapy Plan  Current Treatment Recommendations: Strengthening;ROM;Balance training;Functional mobility training; Endurance training;Gait training; Safety education & training;Patient/Caregiver education & training;Equipment evaluation, education, & procurement;Positioning;Self-Care / ADL; Home management training;Pain management;Cognitive reorientation    Goals  Short Term Goals  Time Frame for Short Term Goals: 10/21- goals ongoing 10/19  Short Term Goal 1: Pt will perform toilet transfer with SPV  Short Term Goal 2: Pt will perform toileting task with SPV  Short Term Goal 3: Pt will perfrom LB bathing with SPV using AE PRN  Short Term Goal 4: Pt will perfrom LB dressign with min A and AE PRN  Short Term Goal 5: Pt will doff/don TLSO with min A  Long Term Goals  Time Frame for Long Term Goals : 10/26- goals ongoing 10/19  Long Term Goal 1: Pt will perform TB bathing mod I  Long Term Goal 2: Pt will perform TB dressing including TLSO with mod I  Long Term Goal 3: Pt will perform toileting task and transfer with mod I  Long Term Goal 4: Pt will perform simple IADL task with SPV  Long Term Goal 5: Pt will verbalize spinal precautions with 0 verbal cues      Second Session Therapy Time:   Individual Concurrent Group Co-treatment   Time In 0930         Time Out 1030         Minutes 60           Timed Code Treatment Minutes:  60 Minutes      Second Session Therapy Time   Individual Concurrent Group Co-treatment   Time In 1100         Time Out 1130         Minutes 30         Timed Code Treatment Minutes: 30 Minutes     Total Treatment Minutes:  90 Minutes    Cuca Ramsey OTR/L (First session only)    Valdemar Kehr, OT

## 2023-10-20 PROBLEM — E44.0 MODERATE MALNUTRITION (HCC): Status: ACTIVE | Noted: 2023-10-20

## 2023-10-20 LAB
ANION GAP SERPL CALCULATED.3IONS-SCNC: 8 MMOL/L (ref 3–16)
BASOPHILS # BLD: 0.1 K/UL (ref 0–0.2)
BASOPHILS NFR BLD: 1.3 %
BUN SERPL-MCNC: 13 MG/DL (ref 7–20)
CALCIUM SERPL-MCNC: 9.2 MG/DL (ref 8.3–10.6)
CHLORIDE SERPL-SCNC: 96 MMOL/L (ref 99–110)
CO2 SERPL-SCNC: 30 MMOL/L (ref 21–32)
CREAT SERPL-MCNC: <0.5 MG/DL (ref 0.6–1.2)
DEPRECATED RDW RBC AUTO: 17.3 % (ref 12.4–15.4)
EOSINOPHIL # BLD: 0.2 K/UL (ref 0–0.6)
EOSINOPHIL NFR BLD: 4.4 %
GFR SERPLBLD CREATININE-BSD FMLA CKD-EPI: >60 ML/MIN/{1.73_M2}
GLUCOSE SERPL-MCNC: 100 MG/DL (ref 70–99)
HCT VFR BLD AUTO: 39.6 % (ref 36–48)
HGB BLD-MCNC: 13 G/DL (ref 12–16)
LYMPHOCYTES # BLD: 1.8 K/UL (ref 1–5.1)
LYMPHOCYTES NFR BLD: 37.4 %
MCH RBC QN AUTO: 29.5 PG (ref 26–34)
MCHC RBC AUTO-ENTMCNC: 32.8 G/DL (ref 31–36)
MCV RBC AUTO: 89.9 FL (ref 80–100)
MONOCYTES # BLD: 0.8 K/UL (ref 0–1.3)
MONOCYTES NFR BLD: 16 %
NEUTROPHILS # BLD: 1.9 K/UL (ref 1.7–7.7)
NEUTROPHILS NFR BLD: 40.9 %
PLATELET # BLD AUTO: 220 K/UL (ref 135–450)
PMV BLD AUTO: 8.5 FL (ref 5–10.5)
POTASSIUM SERPL-SCNC: 4.1 MMOL/L (ref 3.5–5.1)
RBC # BLD AUTO: 4.41 M/UL (ref 4–5.2)
SODIUM SERPL-SCNC: 134 MMOL/L (ref 136–145)
WBC # BLD AUTO: 4.8 K/UL (ref 4–11)

## 2023-10-20 PROCEDURE — 6370000000 HC RX 637 (ALT 250 FOR IP): Performed by: STUDENT IN AN ORGANIZED HEALTH CARE EDUCATION/TRAINING PROGRAM

## 2023-10-20 PROCEDURE — 97110 THERAPEUTIC EXERCISES: CPT

## 2023-10-20 PROCEDURE — 85025 COMPLETE CBC W/AUTO DIFF WBC: CPT

## 2023-10-20 PROCEDURE — 36415 COLL VENOUS BLD VENIPUNCTURE: CPT

## 2023-10-20 PROCEDURE — 97130 THER IVNTJ EA ADDL 15 MIN: CPT

## 2023-10-20 PROCEDURE — 1280000000 HC REHAB R&B

## 2023-10-20 PROCEDURE — 97535 SELF CARE MNGMENT TRAINING: CPT

## 2023-10-20 PROCEDURE — 97116 GAIT TRAINING THERAPY: CPT

## 2023-10-20 PROCEDURE — 97530 THERAPEUTIC ACTIVITIES: CPT

## 2023-10-20 PROCEDURE — 97129 THER IVNTJ 1ST 15 MIN: CPT

## 2023-10-20 PROCEDURE — 80048 BASIC METABOLIC PNL TOTAL CA: CPT

## 2023-10-20 RX ORDER — SENNA AND DOCUSATE SODIUM 50; 8.6 MG/1; MG/1
2 TABLET, FILM COATED ORAL DAILY
Status: DISCONTINUED | OUTPATIENT
Start: 2023-10-21 | End: 2023-10-23

## 2023-10-20 RX ADMIN — POLYETHYLENE GLYCOL 3350 17 G: 17 POWDER, FOR SOLUTION ORAL at 08:07

## 2023-10-20 RX ADMIN — APIXABAN 5 MG: 5 TABLET, FILM COATED ORAL at 08:05

## 2023-10-20 RX ADMIN — ACETAMINOPHEN 1000 MG: 500 TABLET ORAL at 21:21

## 2023-10-20 RX ADMIN — OXYCODONE HYDROCHLORIDE 5 MG: 5 TABLET ORAL at 13:52

## 2023-10-20 RX ADMIN — CITALOPRAM 20 MG: 20 TABLET, FILM COATED ORAL at 21:21

## 2023-10-20 RX ADMIN — Medication 3 MG: at 21:21

## 2023-10-20 RX ADMIN — APIXABAN 5 MG: 5 TABLET, FILM COATED ORAL at 21:22

## 2023-10-20 RX ADMIN — ACETAMINOPHEN 1000 MG: 500 TABLET ORAL at 13:53

## 2023-10-20 RX ADMIN — ACETAMINOPHEN 1000 MG: 500 TABLET ORAL at 06:15

## 2023-10-20 ASSESSMENT — PAIN DESCRIPTION - DESCRIPTORS: DESCRIPTORS: ACHING

## 2023-10-20 ASSESSMENT — PAIN SCALES - GENERAL
PAINLEVEL_OUTOF10: 5
PAINLEVEL_OUTOF10: 0
PAINLEVEL_OUTOF10: 5

## 2023-10-20 ASSESSMENT — PAIN DESCRIPTION - ORIENTATION: ORIENTATION: RIGHT

## 2023-10-20 ASSESSMENT — PAIN DESCRIPTION - LOCATION: LOCATION: BACK

## 2023-10-20 NOTE — PROGRESS NOTES
Occupational Therapy  Facility/Department: [facility] ARU  Rehabilitation Occupational Therapy Daily Treatment Note    Date: 10/20/23  Patient Name: Cristian Solano       Room: 4391/9756-24  MRN: [id number]  Account: [de-identified]   : 1935  (80 y.o.) Gender: female     Past Medical History:  has a past medical history of Acute deep vein thrombosis (DVT) of calf muscle vein of left lower extremity ([address]), Arthritis, Cataract, H/O colonoscopy, Hyperlipidemia, and Osteoporosis. Past Surgical History:   has a past surgical history that includes Cataract removal; Colonoscopy; and hip surgery (Left, 2023). Restrictions  Restrictions/Precautions: Fall Risk, General Precautions, Up as Tolerated, ROM Restrictions  Other position/activity restrictions: TLSO when OOB, log rolling  Required Braces or Orthoses  Spinal: Thoracic Lumbar Sacral Orthotics  Spinal Other: OOB, okay to don/doff at EOB and remove for hygiene  Required Braces or Orthoses?: Yes    Subjective  Subjective: Pt in bed upon OT arrival. No pain at rest but reports back pain during session and at EOS. BP    Objective    ADL  Grooming/Oral Hygiene  Assistance Level: Independent  Skilled Clinical Factors: completed oral care seated on TTB during shower  Upper Extremity Bathing  Assistance Level: Independent  Skilled Clinical Factors: seated on TTB  Lower Extremity Bathing  Assistance Level: Supervision  Skilled Clinical Factors: seated on TTB, figure 4 sit ot wash lower BLEs, cues to remain seated and utilize lateral leans to wash buttocks  Upper Extremity Dressing  Assistance Level:  Moderate assistance  Skilled Clinical Factors: JONES assist to doff/don shirt, Mod A to don TLS, Min A to doff TLSO  Lower Extremity Dressing  Equipment Provided: Reachers  Assistance Level: Minimal assistance  Skilled Clinical Factors: don brief and pants; Min A to thread RLE into brief, able to thread BLEs into pants, CGA while in stance to pull pants up/down, 1 minor posterior LOB noted, pt able to correct with CGA  Putting On/Taking Off Footwear  Equipment Provided: Sock aid  Assistance Level: Minimal assistance  Skilled Clinical Factors: Min A to don L sock using sock aid, pt able to don R sock using figure 4  Toileting  Assistance Level: Stand by assist  Skilled Clinical Factors: SBA in stance to manage brief, completed anterior  hygiene seated  Toilet Transfers  Technique:  (ambulatory)  Equipment: Standard toilet  Assistance Level: Contact guard assist  Skilled Clinical Factors: ambulatory transfer onto/off of toilet using RW, bilateral grab bar usage to sit/stand from toilet  Tub/Shower Transfers  Type: Shower  Transfer From: Rolling walker  Transfer To: Tub transfer bench  Assistance Level: Contact guard assist    Functional Mobility  Device: Wheelchair  Activity: To/From bathroom  Assistance Level: Contact guard assist  Supine to Sit  Assistance Level: Stand by assist  Skilled Clinical Factors: cues for log roll technique  Sit to Stand  Assistance Level: Contact guard assist  Stand to Sit  Assistance Level: Contact guard assist     OT Exercises  Exercise Treatment: BUE therex using 1# free weight: wrist flex/ext, sup/pro, bicep curls, x15 reps each     Assessment   Pt fatigued by EOS and reports back pain towards EOS. She required Min cues to maintain precautions during ADL routine OT educated pt on importance of donning TLSO before standing, pt verbalized understanding but would benefit from re-education/reinforcement. Continue POC. Activity Tolerance: Patient tolerated treatment well  Discharge Recommendations: 24 hour supervision or assist;Home with Home health OT  OT Equipment Recommendations  Equipment Needed: No  Safety Devices  Safety Devices in place: Yes  Type of devices: All fall risk precautions in place;Gait belt;Call light within reach; Patient at risk for falls; Chair alarm in place; Left in chair    Patient Education  Education  Education Given To:

## 2023-10-20 NOTE — PROGRESS NOTES
Physical Therapy  Facility/Department: [facility]  Rehabilitation Physical Therapy Treatment Note    NAME: Esvin Da Silva  : 1935 (80 y.o.)  MRN: [id number]  CODE STATUS: Full Code    Date of Service: 10/20/23       Restrictions:  Restrictions/Precautions: Fall Risk, General Precautions, Up as Tolerated, ROM Restrictions  Required Braces or Orthoses  Spinal: Thoracic Lumbar Sacral Orthotics  Spinal Other: OOB, okay to don/doff at EOB and remove for hygiene  Position Activity Restriction  Spinal Precautions: No Bending, No Lifting, No Twisting  Other position/activity restrictions: TLSO when OOB, log rolling     SUBJECTIVE  Subjective  Subjective: pt found in bed, reporting increased fatigue  Pain: 6-7/10 pain low back          OBJECTIVE  Cognition  Overall Cognitive Status: Exceptions  Arousal/Alertness: Appropriate responses to stimuli  Following Commands: Follows one step commands with increased time; Follows one step commands with repetition  Attention Span: Appears intact  Memory: Decreased short term memory;Decreased recall of precautions  Safety Judgement: Decreased awareness of need for assistance  Problem Solving: Assistance required to generate solutions;Assistance required to implement solutions  Insights: Decreased awareness of deficits  Initiation: Requires cues for some  Sequencing: Requires cues for some  Orientation  Overall Orientation Status: Impaired  Orientation Level: Oriented to person;Oriented to place;Oriented to time;Disoriented to situation    Functional Mobility  Supine to Sit  Assistance Level: Supervision  Skilled Clinical Factors: cues for log rolling  Balance  Standing Balance: Minimal assistance  Standing Balance  Activity: cues for anterior weight shift and COG while completing pericare adn clothing management following commod use 2* mulitple LOB posterior with little to no righting reactions to prevent LOB.  pt initially min A progressing to CGA  Sit to Stand  Assistance Level: Learning: None  Education Outcome: Verbalized understanding;Demonstrated understanding;Continued education needed        Therapy Time   Individual Concurrent Group Co-treatment   Time In 1400         Time Out 1500         Minutes 60           Timed Code Treatment Minutes: [facility] Shen Amado PT, 10/20/23 at 2:47 PM

## 2023-10-20 NOTE — PROGRESS NOTES
Comprehensive Nutrition Assessment    Type and Reason for Visit:  Reassess    Nutrition Recommendations/Plan:   Continue general diet   Continue Ensure BID and Magic Cups daily - add vanilla as flavor preference   Encourage PO intakes as tolerated   Monitor nutrition adequacy, pertinent labs, bowel habits, wt changes, and clinical progress     Malnutrition Assessment:  Malnutrition Status: Moderate malnutrition (10/20/23 1226)    Context:  Acute Illness     Findings of the 6 clinical characteristics of malnutrition:  Energy Intake:  75% or less of estimated energy requirements for 7 or more days  Body Fat Loss:  Mild body fat loss Buccal region   Muscle Mass Loss:  Mild muscle mass loss Temples (temporalis), Clavicles (pectoralis & deltoids)    Nutrition Assessment:    Follow up: Pt remains nutritionally at risk AEB decreased appetite and variable PO intakes. Pt reports appetite decreased for a while. Pt attempting to eat most of her meals, but only liked the potatoes. Pt reports will drink ONS if vanilla. Encouraged pt to try the Magic Cups, vanilla on tray today. Encouraged PO intakes. Declined menu, reports he would not be able to see it. Encouraged pt to ask for other foods if she does not like what was offered. Will continue to monitor. Nutrition Related Findings:    Active BS. BM on 10/19. Na 134. Appears frail. Wound Type: Pressure Injury, Stage I, Unstageable       Current Nutrition Intake & Therapies:    Average Meal Intake: 1-25%, 26-50%, 51-75%, %  Average Supplements Intake: 1-25%  ADULT DIET; Regular  ADULT ORAL NUTRITION SUPPLEMENT; Breakfast, Dinner; Standard High Calorie/High Protein Oral Supplement  ADULT ORAL NUTRITION SUPPLEMENT; Lunch; Frozen Oral Supplement    Anthropometric Measures:  Height: 162.6 cm (5' 4\")  Ideal Body Weight (IBW): 120 lbs (55 kg)    Admission Body Weight: 49 kg (108 lb) (standing scale)  Current Body Weight: 50.8 kg (112 lb), 90 % IBW.  Weight Source: Standing

## 2023-10-20 NOTE — PLAN OF CARE
Problem: Discharge Planning  Goal: Discharge to home or other facility with appropriate resources  10/20/2023 0531 by Román Rowe RN  Outcome: Progressing  10/19/2023 1644 by Anali Villanueva RN  Outcome: Progressing     Problem: Safety - Adult  Goal: Free from fall injury  10/20/2023 0531 by Román Rowe RN  Outcome: Progressing  10/19/2023 1644 by Anali Villanueva RN  Outcome: Progressing     Problem: Pain  Goal: Verbalizes/displays adequate comfort level or baseline comfort level  10/20/2023 0531 by Román Rowe RN  Outcome: Progressing  10/19/2023 1644 by Anali Villanueva RN  Outcome: Progressing     Problem: ABCDS Injury Assessment  Goal: Absence of physical injury  10/20/2023 0531 by Román Rowe RN  Outcome: Progressing  10/19/2023 1644 by Anali Villanueva RN  Outcome: Progressing     Problem: Skin/Tissue Integrity  Goal: Absence of new skin breakdown  Description: 1. Monitor for areas of redness and/or skin breakdown  2. Assess vascular access sites hourly  3. Every 4-6 hours minimum:  Change oxygen saturation probe site  4. Every 4-6 hours:  If on nasal continuous positive airway pressure, respiratory therapy assess nares and determine need for appliance change or resting period.   10/20/2023 0531 by Román Rowe RN  Outcome: Progressing  10/19/2023 1644 by Anali Villanueva RN  Outcome: Progressing     Problem: Nutrition Deficit:  Goal: Optimize nutritional status  10/20/2023 0531 by Román Rowe RN  Outcome: Progressing  10/19/2023 1644 by Anali Villanueva RN  Outcome: Progressing

## 2023-10-20 NOTE — PLAN OF CARE
Problem: Safety - Adult  Goal: Free from fall injury  10/20/2023 0757 by Rafal Cowart RN  Outcome: Progressing  10/20/2023 0531 by Agus Morrow RN  Outcome: Progressing     Problem: Pain  Goal: Verbalizes/displays adequate comfort level or baseline comfort level  10/20/2023 0757 by Rafal Cowart RN  Outcome: Progressing  10/20/2023 0531 by Agus Morrow RN  Outcome: Progressing     Problem: Skin/Tissue Integrity  Goal: Absence of new skin breakdown  Description: 1. Monitor for areas of redness and/or skin breakdown  2. Assess vascular access sites hourly  3. Every 4-6 hours minimum:  Change oxygen saturation probe site  4. Every 4-6 hours:  If on nasal continuous positive airway pressure, respiratory therapy assess nares and determine need for appliance change or resting period.   10/20/2023 0757 by Rafal Cowart RN  Outcome: Progressing  10/20/2023 0531 by Agus Morrow RN  Outcome: Progressing

## 2023-10-20 NOTE — PROGRESS NOTES
[facility]: ACUTE REHAB UNIT  SPEECH-LANGUAGE PATHOLOGY      [x] Daily  [] Weekly Care Conference Note  [] Discharge    Patient:Marina Arias      :1935  [facility]:[id number]  Rehab Dx/Hx: Closed compression fracture of body of L1 vertebra ([address]) [S32.010A]    Precautions: falls  Home situation: lives alone, retired, primarily responsible for laundry and cleaning, son and grandson manage finances, facility manages her medications due to impaired vision   ST Dx: [] Aphasia  [] Dysarthria  [] Apraxia   [] Oropharyngeal dysphagia [x] Cognitive Impairment  [] Other:   Date of Admit: 10/17/2023  Room #: 0165/0165-01    Current functional status (updated daily):         Pt being seen for : [] Speech/Language Treatment  [] Dysphagia Treatment [x] Cognitive Treatment  [] Other:  Communication: [x]WFL  [] Aphasia  [] Dysarthria  [] Apraxia  [] Pragmatic Impairment [] Non-verbal  [] Hearing Loss  [] Other:   Cognition: [] WFL  [] Mild  [x] Moderate  [] Severe [] Unable to Assess  [] Other:  Memory: [] WFL  [] Mild  [x] Moderate  [] Severe [] Unable to Assess  [] Other:  Behavior: [x] Alert  [x] Cooperative  [x]  Pleasant  [] Confused  [] Agitated  [] Uncooperative  [] Distractible [] Motivated  [] Self-Limiting [] Anxious  [] Other:  Endurance:  [x] Adequate for participation in SLP sessions  [] Reduced overall  [] Lethargic  [] Other:  Safety: [x] No concerns at this time  [] Reduced insight into deficits  []  Reduced safety awareness [] Not following call light procedures  [] Unable to Assess  [] Other:    Current Diet Order:ADULT DIET;  Regular  ADULT ORAL NUTRITION SUPPLEMENT; Breakfast, Dinner; Standard High Calorie/High Protein Oral Supplement  ADULT ORAL NUTRITION SUPPLEMENT; Lunch; Frozen Oral Supplement  Swallowing Precautions: Sit up for all meals and thereafter for 30 minutes, Eat with small bites (1/2 tsp; 1 tsp), Alternate solids with liquids, and Eat small meals throughout the day (5x/day)        Date: 10/20/2023

## 2023-10-21 PROCEDURE — 97535 SELF CARE MNGMENT TRAINING: CPT

## 2023-10-21 PROCEDURE — 97116 GAIT TRAINING THERAPY: CPT

## 2023-10-21 PROCEDURE — 97110 THERAPEUTIC EXERCISES: CPT

## 2023-10-21 PROCEDURE — 97130 THER IVNTJ EA ADDL 15 MIN: CPT

## 2023-10-21 PROCEDURE — 97530 THERAPEUTIC ACTIVITIES: CPT

## 2023-10-21 PROCEDURE — 1280000000 HC REHAB R&B

## 2023-10-21 PROCEDURE — 97129 THER IVNTJ 1ST 15 MIN: CPT

## 2023-10-21 PROCEDURE — 6370000000 HC RX 637 (ALT 250 FOR IP): Performed by: STUDENT IN AN ORGANIZED HEALTH CARE EDUCATION/TRAINING PROGRAM

## 2023-10-21 RX ADMIN — OXYCODONE HYDROCHLORIDE 5 MG: 5 TABLET ORAL at 10:04

## 2023-10-21 RX ADMIN — APIXABAN 5 MG: 5 TABLET, FILM COATED ORAL at 20:28

## 2023-10-21 RX ADMIN — ACETAMINOPHEN 1000 MG: 500 TABLET ORAL at 06:33

## 2023-10-21 RX ADMIN — Medication 3 MG: at 20:28

## 2023-10-21 RX ADMIN — ACETAMINOPHEN 1000 MG: 500 TABLET ORAL at 20:27

## 2023-10-21 RX ADMIN — APIXABAN 5 MG: 5 TABLET, FILM COATED ORAL at 10:05

## 2023-10-21 RX ADMIN — CITALOPRAM 20 MG: 20 TABLET, FILM COATED ORAL at 20:28

## 2023-10-21 RX ADMIN — PROPRANOLOL HYDROCHLORIDE 10 MG: 10 TABLET ORAL at 20:28

## 2023-10-21 RX ADMIN — PROPRANOLOL HYDROCHLORIDE 10 MG: 10 TABLET ORAL at 10:05

## 2023-10-21 RX ADMIN — ACETAMINOPHEN 1000 MG: 500 TABLET ORAL at 15:24

## 2023-10-21 ASSESSMENT — PAIN DESCRIPTION - LOCATION: LOCATION: BACK

## 2023-10-21 ASSESSMENT — PAIN DESCRIPTION - DESCRIPTORS: DESCRIPTORS: ACHING

## 2023-10-21 ASSESSMENT — PAIN DESCRIPTION - ORIENTATION: ORIENTATION: LOWER

## 2023-10-21 ASSESSMENT — PAIN SCALES - GENERAL
PAINLEVEL_OUTOF10: 0
PAINLEVEL_OUTOF10: 7

## 2023-10-21 ASSESSMENT — PAIN - FUNCTIONAL ASSESSMENT: PAIN_FUNCTIONAL_ASSESSMENT: PREVENTS OR INTERFERES SOME ACTIVE ACTIVITIES AND ADLS

## 2023-10-21 NOTE — PROGRESS NOTES
reorientation    Goals  Short Term Goals  Time Frame for Short Term Goals: 10/21- goals ongoing 10/21  Short Term Goal 1: Pt will perform toilet transfer with SPV  Short Term Goal 2: Pt will perform toileting task with SPV  Short Term Goal 3: Pt will perfrom LB bathing with SPV using AE PRN-MET 10/20  Short Term Goal 4: Pt will perfrom LB dressign with min A and AE PRN-MET 10/20  Short Term Goal 5: Pt will doff/don TLSO with min A  Long Term Goals  Time Frame for Long Term Goals : 10/26- goals ongoing 10/21  Long Term Goal 1: Pt will perform TB bathing mod I  Long Term Goal 2: Pt will perform TB dressing including TLSO with mod I  Long Term Goal 3: Pt will perform toileting task and transfer with mod I  Long Term Goal 4: Pt will perform simple IADL task with SPV  Long Term Goal 5: Pt will verbalize spinal precautions with 0 verbal cues           Therapy Time   Individual Concurrent Group Co-treatment   Time In 1230         Time Out 1330         Minutes 60         Timed Code Treatment Minutes: 60 Minutes     Second Session Therapy Time:   Individual Concurrent Group Co-treatment   Time In 1410         Time Out 1440         Minutes 30           Timed Code Treatment Minutes:  30 Minutes    Total Treatment Minutes:   [address] [facility] Shoaib Gotti OTR/L    Second session: [employer] OTR/L

## 2023-10-21 NOTE — PROGRESS NOTES
Physical Therapy  Facility/Department: [facility]  Rehabilitation Physical Therapy Treatment Note    NAME: Don Morrison  : 1935 (80 y.o.)  MRN: [id number]  CODE STATUS: Full Code    Date of Service: 10/21/23       Restrictions:  Restrictions/Precautions: Fall Risk, General Precautions, Up as Tolerated, ROM Restrictions  Required Braces or Orthoses  Spinal: Thoracic Lumbar Sacral Orthotics  Spinal Other: OOB, okay to don/doff at EOB and remove for hygiene  Position Activity Restriction  Spinal Precautions: No Bending, No Lifting, No Twisting  Other position/activity restrictions: TLSO when OOB, log rolling     SUBJECTIVE  Subjective  Subjective: pt found in bed, sleeping. once awake agreeable to session  Pain: 5/10 pain low back               OBJECTIVE  Cognition  Overall Cognitive Status: Exceptions  Arousal/Alertness: Appropriate responses to stimuli  Following Commands: Follows one step commands with increased time; Follows one step commands with repetition  Attention Span: Appears intact  Memory: Decreased short term memory;Decreased recall of precautions  Safety Judgement: Decreased awareness of need for assistance  Problem Solving: Assistance required to generate solutions;Assistance required to implement solutions  Insights: Decreased awareness of deficits  Initiation: Requires cues for some  Cognition Comment: vbc's for spinal px and vbc's throughout session to initiate tasks  Orientation  Overall Orientation Status: Impaired  Orientation Level: Oriented to person;Oriented to place;Oriented to time;Disoriented to situation    Functional Mobility  Supine to Sit  Assistance Level: Supervision  Skilled Clinical Factors: cues for log rolling  Balance  Sitting Balance: Stand by assistance  Standing Balance: Contact guard assistance  Standing Balance  Activity: CGA for static standing while donning pants this am  Sit to Stand  Assistance Level: Stand by assist  Skilled Clinical Factors: sit to stand EOB to RW

## 2023-10-21 NOTE — PLAN OF CARE
Problem: Safety - Adult  Goal: Free from fall injury  10/21/2023 1732 by Rusty Horton RN  Outcome: Progressing    Problem: Pain  Goal: Verbalizes/displays adequate comfort level or baseline comfort level  10/21/2023 1732 by Rusty Horton RN  Outcome: Progressing      Problem: ABCDS Injury Assessment  Goal: Absence of physical injury  10/21/2023 1732 by Rusty Horton RN  Outcome: Progressing    Problem: ABCDS Injury Assessment  Goal: Absence of physical injury  10/21/2023 1732 by Rusty Horton RN  Outcome: Progressing     Problem: Nutrition Deficit:  Goal: Optimize nutritional status  10/21/2023 1732 by Rusty Horton RN  Outcome: Progressing

## 2023-10-21 NOTE — PROGRESS NOTES
diarrhea overnight. Min cues needed throughout session. Suspect [place] also playing a factor. Adequate recall and use of functional external aids. Pt tearful initially, stating she was eager to return \"home\". Easily redirected. Improving with current POC and goals. Plan: Continue as per plan of care. Additional Information:     Barriers toward progress: Cognitive deficit  Discharge recommendations:  [] Home independently  [x] Home with assistance []  24 hour supervision  [] ECF [] Other:  Continued Tx Upon Discharge: ? [] Yes [] No [x] TBD based on progress while on ARU [] Vital Stim indicated [] Other:   Estimated discharge date: TBD    Interventions used this date:  [] Speech/Language Treatment  [] Instruction in HEP [] Group [] Dysphagia Treatment [x] Cognitive Treatment   [] Other:       Total Time Breakdown / Charges    Time in Time out Total Time / units   Cognitive Tx 0730 0830 30 mins/ 2 units    Speech Tx -- -- --   Dysphagia Tx -- -- --       Electronically Signed by     Nan Booth M.A., [address] [facility] #[id number]  Speech-Language Pathologist

## 2023-10-22 VITALS
RESPIRATION RATE: 16 BRPM | SYSTOLIC BLOOD PRESSURE: 148 MMHG | BODY MASS INDEX: 18.95 KG/M2 | WEIGHT: 111 LBS | DIASTOLIC BLOOD PRESSURE: 80 MMHG | HEIGHT: 64 IN | TEMPERATURE: 97.8 F | OXYGEN SATURATION: 95 % | HEART RATE: 72 BPM

## 2023-10-22 PROCEDURE — 6370000000 HC RX 637 (ALT 250 FOR IP): Performed by: STUDENT IN AN ORGANIZED HEALTH CARE EDUCATION/TRAINING PROGRAM

## 2023-10-22 PROCEDURE — 1280000000 HC REHAB R&B

## 2023-10-22 RX ADMIN — APIXABAN 5 MG: 5 TABLET, FILM COATED ORAL at 19:59

## 2023-10-22 RX ADMIN — PROPRANOLOL HYDROCHLORIDE 10 MG: 10 TABLET ORAL at 19:59

## 2023-10-22 RX ADMIN — OXYCODONE HYDROCHLORIDE 5 MG: 5 TABLET ORAL at 06:07

## 2023-10-22 RX ADMIN — APIXABAN 5 MG: 5 TABLET, FILM COATED ORAL at 10:13

## 2023-10-22 RX ADMIN — PROPRANOLOL HYDROCHLORIDE 10 MG: 10 TABLET ORAL at 10:14

## 2023-10-22 RX ADMIN — CITALOPRAM 20 MG: 20 TABLET, FILM COATED ORAL at 19:59

## 2023-10-22 RX ADMIN — ACETAMINOPHEN 1000 MG: 500 TABLET ORAL at 14:20

## 2023-10-22 RX ADMIN — Medication 3 MG: at 19:59

## 2023-10-22 RX ADMIN — ACETAMINOPHEN 1000 MG: 500 TABLET ORAL at 20:01

## 2023-10-22 ASSESSMENT — PAIN DESCRIPTION - LOCATION
LOCATION: HIP
LOCATION: HIP

## 2023-10-22 ASSESSMENT — PAIN DESCRIPTION - DESCRIPTORS: DESCRIPTORS: DULL

## 2023-10-22 ASSESSMENT — PAIN SCALES - GENERAL
PAINLEVEL_OUTOF10: 7
PAINLEVEL_OUTOF10: 4

## 2023-10-22 ASSESSMENT — PAIN DESCRIPTION - ORIENTATION
ORIENTATION: LEFT
ORIENTATION: LEFT

## 2023-10-22 NOTE — PLAN OF CARE
Problem: Discharge Planning  Goal: Discharge to home or other facility with appropriate resources  10/21/2023 2313 by Keke Hawthorne RN  Outcome: Progressing  Problem: Safety - Adult  Goal: Free from fall injury  10/21/2023 2313 by Keke Hawthorne RN  Outcome: Progressing  Problem: Pain  Goal: Verbalizes/displays adequate comfort level or baseline comfort level  10/21/2023 2313 by Keke Hawthorne RN  Outcome: Progressing  Problem: ABCDS Injury Assessment  Goal: Absence of physical injury  10/21/2023 2313 by Keke Hawthorne RN  Outcome: Progressing  Problem: Skin/Tissue Integrity  Goal: Absence of new skin breakdown  Description: 1. Monitor for areas of redness and/or skin breakdown  2. Assess vascular access sites hourly  3. Every 4-6 hours minimum:  Change oxygen saturation probe site  4. Every 4-6 hours:  If on nasal continuous positive airway pressure, respiratory therapy assess nares and determine need for appliance change or resting period.   10/21/2023 2313 by Keke Hawthorne RN  Outcome: Progressing  Problem: Nutrition Deficit:  Goal: Optimize nutritional status  10/21/2023 2313 by Keke Hawthorne RN  Outcome: Progressing

## 2023-10-22 NOTE — PLAN OF CARE
Problem: Safety - Adult  Goal: Free from fall injury  10/22/2023 1212 by Reji Mcgowan RN  Outcome: Progressing    Problem: Pain  Goal: Verbalizes/displays adequate comfort level or baseline comfort level  10/22/2023 1212 by Reji Mcgowan RN  Outcome: Progressing    Problem: Nutrition Deficit:  Goal: Optimize nutritional status  10/22/2023 1212 by Reji Mcgowan RN  Outcome: Progressing

## 2023-10-23 LAB
ANION GAP SERPL CALCULATED.3IONS-SCNC: 9 MMOL/L (ref 3–16)
BASOPHILS # BLD: 0.1 K/UL (ref 0–0.2)
BASOPHILS NFR BLD: 1.3 %
BUN SERPL-MCNC: 11 MG/DL (ref 7–20)
CALCIUM SERPL-MCNC: 8.9 MG/DL (ref 8.3–10.6)
CHLORIDE SERPL-SCNC: 101 MMOL/L (ref 99–110)
CO2 SERPL-SCNC: 28 MMOL/L (ref 21–32)
CREAT SERPL-MCNC: <0.5 MG/DL (ref 0.6–1.2)
DEPRECATED RDW RBC AUTO: 17.5 % (ref 12.4–15.4)
EOSINOPHIL # BLD: 0.2 K/UL (ref 0–0.6)
EOSINOPHIL NFR BLD: 4.4 %
GFR SERPLBLD CREATININE-BSD FMLA CKD-EPI: >60 ML/MIN/{1.73_M2}
GLUCOSE SERPL-MCNC: 109 MG/DL (ref 70–99)
HCT VFR BLD AUTO: 37.2 % (ref 36–48)
HGB BLD-MCNC: 12.3 G/DL (ref 12–16)
LYMPHOCYTES # BLD: 1.3 K/UL (ref 1–5.1)
LYMPHOCYTES NFR BLD: 29.1 %
MCH RBC QN AUTO: 29.4 PG (ref 26–34)
MCHC RBC AUTO-ENTMCNC: 33 G/DL (ref 31–36)
MCV RBC AUTO: 89.3 FL (ref 80–100)
MONOCYTES # BLD: 0.6 K/UL (ref 0–1.3)
MONOCYTES NFR BLD: 14.6 %
NEUTROPHILS # BLD: 2.3 K/UL (ref 1.7–7.7)
NEUTROPHILS NFR BLD: 50.6 %
PLATELET # BLD AUTO: 222 K/UL (ref 135–450)
PMV BLD AUTO: 7.9 FL (ref 5–10.5)
POTASSIUM SERPL-SCNC: 3.6 MMOL/L (ref 3.5–5.1)
RBC # BLD AUTO: 4.17 M/UL (ref 4–5.2)
SODIUM SERPL-SCNC: 138 MMOL/L (ref 136–145)
WBC # BLD AUTO: 4.5 K/UL (ref 4–11)

## 2023-10-23 PROCEDURE — 85025 COMPLETE CBC W/AUTO DIFF WBC: CPT

## 2023-10-23 PROCEDURE — 97116 GAIT TRAINING THERAPY: CPT

## 2023-10-23 PROCEDURE — 1280000000 HC REHAB R&B

## 2023-10-23 PROCEDURE — 97530 THERAPEUTIC ACTIVITIES: CPT

## 2023-10-23 PROCEDURE — 97535 SELF CARE MNGMENT TRAINING: CPT

## 2023-10-23 PROCEDURE — 36415 COLL VENOUS BLD VENIPUNCTURE: CPT

## 2023-10-23 PROCEDURE — 97110 THERAPEUTIC EXERCISES: CPT

## 2023-10-23 PROCEDURE — 97130 THER IVNTJ EA ADDL 15 MIN: CPT

## 2023-10-23 PROCEDURE — 97129 THER IVNTJ 1ST 15 MIN: CPT

## 2023-10-23 PROCEDURE — 80048 BASIC METABOLIC PNL TOTAL CA: CPT

## 2023-10-23 PROCEDURE — 6370000000 HC RX 637 (ALT 250 FOR IP): Performed by: STUDENT IN AN ORGANIZED HEALTH CARE EDUCATION/TRAINING PROGRAM

## 2023-10-23 RX ORDER — LOPERAMIDE HYDROCHLORIDE 2 MG/1
2 CAPSULE ORAL 4 TIMES DAILY PRN
Status: DISCONTINUED | OUTPATIENT
Start: 2023-10-23 | End: 2023-10-26

## 2023-10-23 RX ADMIN — PROPRANOLOL HYDROCHLORIDE 10 MG: 10 TABLET ORAL at 21:58

## 2023-10-23 RX ADMIN — Medication 3 MG: at 21:56

## 2023-10-23 RX ADMIN — CITALOPRAM 20 MG: 20 TABLET, FILM COATED ORAL at 21:57

## 2023-10-23 RX ADMIN — ACETAMINOPHEN 1000 MG: 500 TABLET ORAL at 13:34

## 2023-10-23 RX ADMIN — OXYCODONE HYDROCHLORIDE 5 MG: 5 TABLET ORAL at 22:02

## 2023-10-23 RX ADMIN — OXYCODONE HYDROCHLORIDE 10 MG: 5 TABLET ORAL at 11:58

## 2023-10-23 RX ADMIN — APIXABAN 5 MG: 5 TABLET, FILM COATED ORAL at 21:57

## 2023-10-23 RX ADMIN — APIXABAN 5 MG: 5 TABLET, FILM COATED ORAL at 08:09

## 2023-10-23 RX ADMIN — ACETAMINOPHEN 1000 MG: 500 TABLET ORAL at 05:59

## 2023-10-23 RX ADMIN — ACETAMINOPHEN 1000 MG: 500 TABLET ORAL at 21:56

## 2023-10-23 RX ADMIN — OXYCODONE HYDROCHLORIDE 5 MG: 5 TABLET ORAL at 05:58

## 2023-10-23 ASSESSMENT — PAIN SCALES - GENERAL
PAINLEVEL_OUTOF10: 3
PAINLEVEL_OUTOF10: 6
PAINLEVEL_OUTOF10: 0
PAINLEVEL_OUTOF10: 7
PAINLEVEL_OUTOF10: 7
PAINLEVEL_OUTOF10: 6

## 2023-10-23 ASSESSMENT — PAIN DESCRIPTION - DESCRIPTORS
DESCRIPTORS: ACHING
DESCRIPTORS: DULL

## 2023-10-23 ASSESSMENT — PAIN DESCRIPTION - LOCATION
LOCATION: BACK

## 2023-10-23 ASSESSMENT — PAIN DESCRIPTION - ORIENTATION: ORIENTATION: LOWER

## 2023-10-23 NOTE — PROGRESS NOTES
2: Pt will perform toileting task with SPV-NOT MET  Short Term Goal 3: Pt will perfrom LB bathing with SPV using AE PRN-MET 10/20  Short Term Goal 4: Pt will perfrom LB dressign with min A and AE PRN-MET 10/20  Short Term Goal 5: Pt will doff/don TLSO with min A-MET 10/23  Long Term Goals  Time Frame for Long Term Goals : 10/26- goals ongoing 10/23  Long Term Goal 1: Pt will perform TB bathing mod I  Long Term Goal 2: Pt will perform TB dressing including TLSO with mod I  Long Term Goal 3: Pt will perform toileting task and transfer with mod I  Long Term Goal 4: Pt will perform simple IADL task with SPV  Long Term Goal 5: Pt will verbalize spinal precautions with 0 verbal cues-MET 10/23      Therapy Time   Individual Concurrent Group Co-treatment   Time In 1230         Time Out 1400         Minutes 90         Timed Code Treatment Minutes: 90 Minutes       Tremaine Velasco OT

## 2023-10-23 NOTE — PROGRESS NOTES
[facility]: ACUTE REHAB UNIT  SPEECH-LANGUAGE PATHOLOGY      [x] Daily  [] Weekly Care Conference Note  [] Discharge    Patient:Marina Desouza      :1935  [place]  Rehab Dx/Hx: Closed compression fracture of body of L1 vertebra ([address]) [S32.010A]    Precautions: falls  Home situation: lives alone, retired, primarily responsible for laundry and cleaning, son and grandson manage finances, facility manages her medications due to impaired vision   ST Dx: [] Aphasia  [] Dysarthria  [] Apraxia   [] Oropharyngeal dysphagia [x] Cognitive Impairment  [] Other:   Date of Admit: 10/17/2023  Room #: 0165/0165-01    Current functional status (updated daily):         Pt being seen for : [] Speech/Language Treatment  [] Dysphagia Treatment [x] Cognitive Treatment  [] Other:  Communication: [x]WFL  [] Aphasia  [] Dysarthria  [] Apraxia  [] Pragmatic Impairment [] Non-verbal  [] Hearing Loss  [] Other:   Cognition: [] WFL  [] Mild  [x] Moderate  [] Severe [] Unable to Assess  [] Other:  Memory: [] WFL  [] Mild  [x] Moderate  [] Severe [] Unable to Assess  [] Other:  Behavior: [x] Alert  [x] Cooperative  [x]  Pleasant  [] Confused  [] Agitated  [] Uncooperative  [] Distractible [] Motivated  [] Self-Limiting [] Anxious  [] Other:  Endurance:  [x] Adequate for participation in SLP sessions  [] Reduced overall  [] Lethargic  [] Other:  Safety: [x] No concerns at this time  [] Reduced insight into deficits  []  Reduced safety awareness [] Not following call light procedures  [] Unable to Assess  [] Other:    Current Diet Order:ADULT DIET;  Regular  ADULT ORAL NUTRITION SUPPLEMENT; Breakfast, Dinner; Standard High Calorie/High Protein Oral Supplement  ADULT ORAL NUTRITION SUPPLEMENT; Lunch; Frozen Oral Supplement  Swallowing Precautions: Sit up for all meals and thereafter for 30 minutes, Eat with small bites (1/2 tsp; 1 tsp), Alternate solids with liquids, and Eat small meals throughout the day (5x/day)        Date: 10/23/2023

## 2023-10-23 NOTE — PLAN OF CARE
Problem: Safety - Adult  Goal: Free from fall injury  10/23/2023 0738 by Aleshia Graham RN  Outcome: Progressing  10/22/2023 2144 by Shaun Mack RN  Outcome: Progressing     Problem: Pain  Goal: Verbalizes/displays adequate comfort level or baseline comfort level  Outcome: Progressing     Problem: Skin/Tissue Integrity  Goal: Absence of new skin breakdown  Description: 1. Monitor for areas of redness and/or skin breakdown  2. Assess vascular access sites hourly  3. Every 4-6 hours minimum:  Change oxygen saturation probe site  4. Every 4-6 hours:  If on nasal continuous positive airway pressure, respiratory therapy assess nares and determine need for appliance change or resting period.   Outcome: Progressing

## 2023-10-23 NOTE — PLAN OF CARE
Problem: Safety - Adult  Goal: Free from fall injury  10/22/2023 2144 by Aster Walker RN  Outcome: Progressing  10/22/2023 1212 by Vladimir Wick RN  Outcome: Progressing

## 2023-10-23 NOTE — PROGRESS NOTES
Physical Therapy  Facility/Department: [facility]  Rehabilitation Physical Therapy Treatment Note    NAME: Elidia Workman  : 1935 (80 y.o.)  MRN: [id number]  CODE STATUS: Full Code    Date of Service: 10/23/23       Restrictions:  Restrictions/Precautions: Fall Risk, General Precautions, Up as Tolerated, ROM Restrictions  Required Braces or Orthoses  Spinal: Thoracic Lumbar Sacral Orthotics  Spinal Other: OOB, okay to don/doff at EOB and remove for hygiene  Position Activity Restriction  Spinal Precautions: No Bending, No Lifting, No Twisting  Other position/activity restrictions: TLSO when OOB, log rolling     SUBJECTIVE  Subjective  Subjective: Pt seated in w/c on approach, pleasant and agreeable to PT tx  Pain: 5/10 pain low back               OBJECTIVE  Orientation  Overall Orientation Status: Within Functional Limits    Functional Mobility  Balance  Sitting Balance: Stand by assistance  Standing Balance: Stand by assistance  Standing Balance  Activity: Grossly SBA with SW    Transfers  Surface: Wheelchair  Additional Factors: Verbal cues; Hand placement cues; Increased time to complete  Device: Walker (RW and // bars)  Sit to Stand  Assistance Level: Stand by assist  Skilled Clinical Factors: Pt completes multiple t/f with RW and in // bars with SBA, min cues for hand placement  Stand to Sit  Assistance Level: Stand by assist  Skilled Clinical Factors: Pt completes multiple t/f with RW and in // bars with SBA, min cues for hand placement      Environmental Mobility  Ambulation  Surface: Level surface  Device: Rolling walker  Distance: 120' + 160'  Activity: Within Unit  Assistance Level: Stand by assist  Gait Deviations: Slow sandeep;Narrow base of support;Decreased step length bilateral;Decreased heel strike right;Decreased heel strike left;Decreased weight shift left  Skilled Clinical Factors: Slightly antalgic pattern, L decreased WS and stance, B decreased heek strike, B decreased hip extension.  Grossly

## 2023-10-24 PROCEDURE — 97129 THER IVNTJ 1ST 15 MIN: CPT

## 2023-10-24 PROCEDURE — 97530 THERAPEUTIC ACTIVITIES: CPT

## 2023-10-24 PROCEDURE — 6370000000 HC RX 637 (ALT 250 FOR IP): Performed by: PHYSICAL MEDICINE & REHABILITATION

## 2023-10-24 PROCEDURE — 6370000000 HC RX 637 (ALT 250 FOR IP): Performed by: STUDENT IN AN ORGANIZED HEALTH CARE EDUCATION/TRAINING PROGRAM

## 2023-10-24 PROCEDURE — 97110 THERAPEUTIC EXERCISES: CPT

## 2023-10-24 PROCEDURE — 1280000000 HC REHAB R&B

## 2023-10-24 PROCEDURE — 97116 GAIT TRAINING THERAPY: CPT

## 2023-10-24 PROCEDURE — 97535 SELF CARE MNGMENT TRAINING: CPT

## 2023-10-24 PROCEDURE — 97130 THER IVNTJ EA ADDL 15 MIN: CPT

## 2023-10-24 RX ADMIN — CITALOPRAM 20 MG: 20 TABLET, FILM COATED ORAL at 21:16

## 2023-10-24 RX ADMIN — Medication 3 MG: at 21:16

## 2023-10-24 RX ADMIN — APIXABAN 5 MG: 5 TABLET, FILM COATED ORAL at 21:16

## 2023-10-24 RX ADMIN — OXYCODONE HYDROCHLORIDE 10 MG: 5 TABLET ORAL at 07:57

## 2023-10-24 RX ADMIN — APIXABAN 5 MG: 5 TABLET, FILM COATED ORAL at 07:57

## 2023-10-24 RX ADMIN — PROPRANOLOL HYDROCHLORIDE 10 MG: 10 TABLET ORAL at 21:16

## 2023-10-24 RX ADMIN — ACETAMINOPHEN 1000 MG: 500 TABLET ORAL at 14:31

## 2023-10-24 RX ADMIN — LOPERAMIDE HYDROCHLORIDE 2 MG: 2 CAPSULE ORAL at 21:22

## 2023-10-24 RX ADMIN — OXYCODONE HYDROCHLORIDE 10 MG: 5 TABLET ORAL at 21:17

## 2023-10-24 RX ADMIN — ACETAMINOPHEN 1000 MG: 500 TABLET ORAL at 21:16

## 2023-10-24 RX ADMIN — PROPRANOLOL HYDROCHLORIDE 10 MG: 10 TABLET ORAL at 07:57

## 2023-10-24 RX ADMIN — ACETAMINOPHEN 1000 MG: 500 TABLET ORAL at 06:06

## 2023-10-24 ASSESSMENT — PAIN DESCRIPTION - ORIENTATION
ORIENTATION: LEFT;RIGHT;LOWER;MID
ORIENTATION: LOWER

## 2023-10-24 ASSESSMENT — PAIN DESCRIPTION - LOCATION
LOCATION: BACK

## 2023-10-24 ASSESSMENT — PAIN SCALES - GENERAL
PAINLEVEL_OUTOF10: 8
PAINLEVEL_OUTOF10: 3
PAINLEVEL_OUTOF10: 7

## 2023-10-24 ASSESSMENT — PAIN DESCRIPTION - DESCRIPTORS
DESCRIPTORS: ACHING;DISCOMFORT
DESCRIPTORS: ACHING

## 2023-10-24 ASSESSMENT — PAIN DESCRIPTION - PAIN TYPE: TYPE: CHRONIC PAIN

## 2023-10-24 NOTE — PLAN OF CARE
Problem: Safety - Adult  Goal: Free from fall injury  10/24/2023 0729 by Gisela Santana RN  Outcome: Progressing  10/24/2023 0649 by Juan Antonio Busby RN  Outcome: Progressing  10/24/2023 0157 by Juan Antonio Busby RN  Outcome: Progressing     Problem: Skin/Tissue Integrity  Goal: Absence of new skin breakdown  Description: 1. Monitor for areas of redness and/or skin breakdown  2. Assess vascular access sites hourly  3. Every 4-6 hours minimum:  Change oxygen saturation probe site  4. Every 4-6 hours:  If on nasal continuous positive airway pressure, respiratory therapy assess nares and determine need for appliance change or resting period.   Outcome: Progressing

## 2023-10-24 NOTE — PATIENT CARE CONFERENCE
[facility]  Inpatient Rehabilitation  Weekly Team Conference Note    Date: 10/25/2023  Patient Name: Mal Oliver        MRN: [id number]    : 1935  (80 y.o.)  Gender: female   Referring Practitioner: Jigna Still MD  Diagnosis: L1 compression fx      Interventions to be utilized toward barriers to discharge, per discipline:  Augustin Valentin observed barriers to dc: Pain, lower extremity weakness, upper extremity weakness  Nursing interventions: medication management, assist with ADLs   Family Education: No  Fall Risk:  Yes    Stay with me?: Yes    PHYSICAL THERAPY  Physical therapy observed barriers to dc:    Baseline: lives alone at [address] [facility]).  Ind without AD for mobility   Current level: supv bed mobility, SBA transfers/gait with RW up to 150 ft, CGA stairs   Barriers to DC: pain, level of assist, balance    Needs in order to achieve dc home/next level of care: anticipate pt to dc home with prn assist and HHPT. DME: none, owns RW      Physical therapy interventions:   Current Treatment Recommendations: Strengthening, ROM, Balance training, Functional mobility training, Transfer training, Endurance training, Gait training, Neuromuscular re-education, Home exercise program, Safety education & training, Patient/Caregiver education & training, Equipment evaluation, education, & procurement, Therapeutic activities, Wheelchair mobility training, Pain management, Modalities, Positioning    PT Goals:            Short Term Goals  Time Frame for Short Term Goals: 5 days 10/21/23  Short Term Goal 1: Pt will complete bed mobility with Sup maintaining px- GOAL MET with cues  Short Term Goal 2: Pt will complete functional t/f with LRAD with SBA- GOAL MET  Short Term Goal 3: Pt will ambulate >100' with RW with SBA without LOB- GOAL MET  Short Term Goal 4: Pt will ascend/descend curb step with RW with CGA- GOAL MET with RW            Long Term Goals  Time Frame for Long Term Goals : 10 days

## 2023-10-24 NOTE — PROGRESS NOTES
Occupational Therapy  Facility/Department: [facility] ARU  Rehabilitation Occupational Therapy Daily Treatment Note    Date: 10/24/23  Patient Name: Jose Carter       Room: 0354/4356-66  MRN: [id number]  Account: [de-identified]   : 1935  (80 y.o.) Gender: female                    Past Medical History:  has a past medical history of Acute deep vein thrombosis (DVT) of calf muscle vein of left lower extremity ([address]), Arthritis, Cataract, H/O colonoscopy, Hyperlipidemia, and Osteoporosis. Past Surgical History:   has a past surgical history that includes Cataract removal; Colonoscopy; and hip surgery (Left, 2023). Restrictions  Restrictions/Precautions: Fall Risk, General Precautions, Up as Tolerated, ROM Restrictions  Other position/activity restrictions: TLSO when OOB, log rolling  Required Braces or Orthoses  Spinal: Thoracic Lumbar Sacral Orthotics  Spinal Other: OOB, okay to don/doff at EOB and remove for hygiene  Required Braces or Orthoses?: Yes    Subjective  Subjective: Pt in bed upon OT arrival. Denies pain. /79, HR 84, O2 93% on RA. Objective    ADL  Upper Extremity Dressing  Assistance Level: Minimal assistance  Skilled Clinical Factors: Min A to doff/don TLSO    Functional Mobility  Device: Rolling walker  Activity:  (to/from hospital lobby)  Assistance Level: Stand by assist;Contact guard assist  Sit to Supine  Assistance Level: Moderate assistance  Skilled Clinical Factors: log roll, HOB elevated, assist to manage BLEs onto bed  Supine to Sit  Assistance Level: Stand by assist  Skilled Clinical Factors: log roll, HOB elevated  Sit to Stand  Assistance Level: Contact guard assist  Skilled Clinical Factors: with RW  Stand to Sit  Assistance Level: Contact guard assist  Skilled Clinical Factors: with RW    OT Exercises  Exercise Treatment: BUE therex using 2# free weight:  sup/pro, bicep curls, forward punches, ER/IR;  x10-15 reps each     Assessment   Pt tolerated first session well.  OT

## 2023-10-24 NOTE — PLAN OF CARE
Problem: Discharge Planning  Goal: Discharge to home or other facility with appropriate resources  Outcome: Progressing     Problem: Safety - Adult  Goal: Free from fall injury  10/24/2023 0649 by Nakita Nesbitt RN  Outcome: Progressing  10/24/2023 0157 by Nakita Nesbitt, RN  Outcome: Progressing     Problem: Pain  Goal: Verbalizes/displays adequate comfort level or baseline comfort level  Outcome: Progressing

## 2023-10-24 NOTE — PROGRESS NOTES
[facility]: ACUTE REHAB UNIT  SPEECH-LANGUAGE PATHOLOGY      [x] Daily  [] Weekly Care Conference Note  [] Discharge    Patient:Ruth Charmayne Jenny      :1935  ZDU:[id number]  Rehab Dx/Hx: Closed compression fracture of body of L1 vertebra ([address]) [S32.010A]    Precautions: falls  Home situation: lives alone, retired, primarily responsible for laundry and cleaning, son and grandson manage finances, facility manages her medications due to impaired vision   ST Dx: [] Aphasia  [] Dysarthria  [] Apraxia   [] Oropharyngeal dysphagia [x] Cognitive Impairment  [] Other:   Date of Admit: 10/17/2023  Room #: 0165/0165-01    Current functional status (updated daily):         Pt being seen for : [] Speech/Language Treatment  [] Dysphagia Treatment [x] Cognitive Treatment  [] Other:  Communication: [x]WFL  [] Aphasia  [] Dysarthria  [] Apraxia  [] Pragmatic Impairment [] Non-verbal  [] Hearing Loss  [] Other:   Cognition: [] WFL  [] Mild  [x] Moderate  [] Severe [] Unable to Assess  [] Other:  Memory: [] WFL  [] Mild  [x] Moderate  [] Severe [] Unable to Assess  [] Other:  Behavior: [x] Alert  [x] Cooperative  [x]  Pleasant  [] Confused  [] Agitated  [] Uncooperative  [] Distractible [] Motivated  [] Self-Limiting [] Anxious  [] Other:  Endurance:  [x] Adequate for participation in SLP sessions  [] Reduced overall  [] Lethargic  [] Other:  Safety: [x] No concerns at this time  [] Reduced insight into deficits  []  Reduced safety awareness [] Not following call light procedures  [] Unable to Assess  [] Other:    Current Diet Order:ADULT DIET;  Regular  ADULT ORAL NUTRITION SUPPLEMENT; Breakfast; Standard High Calorie/High Protein Oral Supplement  Swallowing Precautions: Sit up for all meals and thereafter for 30 minutes, Eat with small bites (1/2 tsp; 1 tsp), Alternate solids with liquids, and Eat small meals throughout the day (5x/day)        Date: 10/24/2023      Tx session 1  1430 - 1500   Tx session 2  All tx needs met in session 1

## 2023-10-24 NOTE — PROGRESS NOTES
Wound Care re checked posterior ears pressure areas. Hydrocolloids in place. Patient states staff has been changing dressings and it does not bother her. Is helping to protect posterior skin of ear.

## 2023-10-24 NOTE — PROGRESS NOTES
Comprehensive Nutrition Assessment    Type and Reason for Visit:  Reassess    Nutrition Recommendations/Plan:   Continue general diet   Discontinue Magic Cups   Decrease Ensure to daily - pt drinks one daily at home   Encourage PO intakes as tolerated   Encourage soluble fiber foods and lower fiber foods during diarrhea   Monitor nutrition adequacy, pertinent labs, bowel habits, wt changes, and clinical progress     Malnutrition Assessment:  Malnutrition Status: Moderate malnutrition (10/20/23 1226)    Context:  Acute Illness     Findings of the 6 clinical characteristics of malnutrition:  Energy Intake:  75% or less of estimated energy requirements for 7 or more days  Body Fat Loss:  Mild body fat loss Buccal region   Muscle Mass Loss:  Mild muscle mass loss Temples (temporalis), Clavicles (pectoralis & deltoids)    Nutrition Assessment:    Follow up: Pt continues on general diet. Pt reports appetite slowly improving, but now having diarrhea. Pt very concerned about several foods she normally tolerates at home for possiblity of making diarrhea worse. Pt not drinking milk or Ensure anymore, drinks she often drinks at home d/t diarrhea. Discussed pt was on medications that could have been giving her diarrhea. Encouraged pt to add soluble fiber foods including bananas and applesauce and lower fiber foods to thicken stool. Encouraged pt to drink Ensure once daily during ARU stay, pt was drinking them at home. Will continue to monitor. Nutrition Related Findings:    Active BS. +Loose stool. BM on 10/22. Labs reviewed. Wound Type: Pressure Injury, Stage I, Stage II       Current Nutrition Intake & Therapies:    Average Meal Intake: 1-25%, 51-75%, %  Average Supplements Intake: 0%, 1-25%  ADULT DIET;  Regular  ADULT ORAL NUTRITION SUPPLEMENT; Breakfast, Dinner; Standard High Calorie/High Protein Oral Supplement  ADULT ORAL NUTRITION SUPPLEMENT; Lunch; Frozen Oral Supplement    Anthropometric Measures:  Height:

## 2023-10-24 NOTE — PROGRESS NOTES
Physical Therapy  Facility/Department: [facility]  Rehabilitation Physical Therapy Treatment Note    NAME: Luciano Oh  : 1935 (80 y.o.)  MRN: [id number]  CODE STATUS: Full Code    Date of Service: 10/24/23       Restrictions:  Restrictions/Precautions: Fall Risk, General Precautions, Up as Tolerated, ROM Restrictions  Required Braces or Orthoses  Spinal: Thoracic Lumbar Sacral Orthotics  Spinal Other: OOB, okay to don/doff at EOB and remove for hygiene  Position Activity Restriction  Spinal Precautions: No Bending, No Lifting, No Twisting  Other position/activity restrictions: TLSO when OOB, log rolling     SUBJECTIVE  Subjective  Subjective: pt found in bed, reports increased pain. RN provided pt with pain medication  Pain: 8/10 low back            OBJECTIVE  Cognition  Overall Cognitive Status: Exceptions  Arousal/Alertness: Appropriate responses to stimuli  Following Commands: Follows one step commands with increased time; Follows one step commands with repetition  Attention Span: Appears intact  Memory: Decreased short term memory;Decreased recall of precautions  Safety Judgement: Decreased awareness of need for assistance  Problem Solving: Assistance required to generate solutions;Assistance required to implement solutions  Insights: Decreased awareness of deficits  Initiation: Requires cues for some  Sequencing: Requires cues for some  Orientation  Overall Orientation Status: Within Functional Limits  Orientation Level: Oriented to situation;Oriented to time;Oriented to place;Oriented to person    Functional Mobility  Supine to Sit  Assistance Level: Supervision  Skilled Clinical Factors: cues for log rolling  Balance  Sitting Balance: Stand by assistance  Standing Balance  Activity: SBA while pt donned pants standing EOB with no LOB  Sit to Stand  Assistance Level: Supervision  Skilled Clinical Factors: from EOB and chair to RW with cues for hand placement  Stand to Sit  Assistance Level:

## 2023-10-25 LAB
ANION GAP SERPL CALCULATED.3IONS-SCNC: 10 MMOL/L (ref 3–16)
BASOPHILS # BLD: 0.1 K/UL (ref 0–0.2)
BASOPHILS NFR BLD: 1 %
BUN SERPL-MCNC: 12 MG/DL (ref 7–20)
CALCIUM SERPL-MCNC: 9.2 MG/DL (ref 8.3–10.6)
CHLORIDE SERPL-SCNC: 101 MMOL/L (ref 99–110)
CO2 SERPL-SCNC: 28 MMOL/L (ref 21–32)
CREAT SERPL-MCNC: <0.5 MG/DL (ref 0.6–1.2)
DEPRECATED RDW RBC AUTO: 17.1 % (ref 12.4–15.4)
EOSINOPHIL # BLD: 0.2 K/UL (ref 0–0.6)
EOSINOPHIL NFR BLD: 4.2 %
GFR SERPLBLD CREATININE-BSD FMLA CKD-EPI: >60 ML/MIN/{1.73_M2}
GLUCOSE SERPL-MCNC: 96 MG/DL (ref 70–99)
HCT VFR BLD AUTO: 36.9 % (ref 36–48)
HGB BLD-MCNC: 11.8 G/DL (ref 12–16)
LYMPHOCYTES # BLD: 1.9 K/UL (ref 1–5.1)
LYMPHOCYTES NFR BLD: 35.8 %
MCH RBC QN AUTO: 28.9 PG (ref 26–34)
MCHC RBC AUTO-ENTMCNC: 32 G/DL (ref 31–36)
MCV RBC AUTO: 90.3 FL (ref 80–100)
MONOCYTES # BLD: 0.8 K/UL (ref 0–1.3)
MONOCYTES NFR BLD: 15.2 %
NEUTROPHILS # BLD: 2.3 K/UL (ref 1.7–7.7)
NEUTROPHILS NFR BLD: 43.8 %
PLATELET # BLD AUTO: 243 K/UL (ref 135–450)
PMV BLD AUTO: 8 FL (ref 5–10.5)
POTASSIUM SERPL-SCNC: 4 MMOL/L (ref 3.5–5.1)
RBC # BLD AUTO: 4.08 M/UL (ref 4–5.2)
SODIUM SERPL-SCNC: 139 MMOL/L (ref 136–145)
WBC # BLD AUTO: 5.2 K/UL (ref 4–11)

## 2023-10-25 PROCEDURE — 6370000000 HC RX 637 (ALT 250 FOR IP): Performed by: STUDENT IN AN ORGANIZED HEALTH CARE EDUCATION/TRAINING PROGRAM

## 2023-10-25 PROCEDURE — 80048 BASIC METABOLIC PNL TOTAL CA: CPT

## 2023-10-25 PROCEDURE — 97530 THERAPEUTIC ACTIVITIES: CPT

## 2023-10-25 PROCEDURE — 1280000000 HC REHAB R&B

## 2023-10-25 PROCEDURE — 6370000000 HC RX 637 (ALT 250 FOR IP): Performed by: PHYSICAL MEDICINE & REHABILITATION

## 2023-10-25 PROCEDURE — 85025 COMPLETE CBC W/AUTO DIFF WBC: CPT

## 2023-10-25 PROCEDURE — 97112 NEUROMUSCULAR REEDUCATION: CPT

## 2023-10-25 PROCEDURE — 36415 COLL VENOUS BLD VENIPUNCTURE: CPT

## 2023-10-25 PROCEDURE — 97110 THERAPEUTIC EXERCISES: CPT

## 2023-10-25 PROCEDURE — 97535 SELF CARE MNGMENT TRAINING: CPT

## 2023-10-25 PROCEDURE — 97129 THER IVNTJ 1ST 15 MIN: CPT

## 2023-10-25 PROCEDURE — 97130 THER IVNTJ EA ADDL 15 MIN: CPT

## 2023-10-25 PROCEDURE — 97116 GAIT TRAINING THERAPY: CPT

## 2023-10-25 RX ADMIN — CITALOPRAM 20 MG: 20 TABLET, FILM COATED ORAL at 20:52

## 2023-10-25 RX ADMIN — OXYCODONE HYDROCHLORIDE 5 MG: 5 TABLET ORAL at 08:14

## 2023-10-25 RX ADMIN — APIXABAN 5 MG: 5 TABLET, FILM COATED ORAL at 08:14

## 2023-10-25 RX ADMIN — ACETAMINOPHEN 1000 MG: 500 TABLET ORAL at 20:52

## 2023-10-25 RX ADMIN — LOPERAMIDE HYDROCHLORIDE 2 MG: 2 CAPSULE ORAL at 07:03

## 2023-10-25 RX ADMIN — ACETAMINOPHEN 1000 MG: 500 TABLET ORAL at 14:12

## 2023-10-25 RX ADMIN — PROPRANOLOL HYDROCHLORIDE 10 MG: 10 TABLET ORAL at 08:14

## 2023-10-25 RX ADMIN — OXYCODONE HYDROCHLORIDE 5 MG: 5 TABLET ORAL at 14:17

## 2023-10-25 RX ADMIN — OXYCODONE HYDROCHLORIDE 10 MG: 5 TABLET ORAL at 20:52

## 2023-10-25 RX ADMIN — ACETAMINOPHEN 1000 MG: 500 TABLET ORAL at 07:00

## 2023-10-25 RX ADMIN — APIXABAN 5 MG: 5 TABLET, FILM COATED ORAL at 20:52

## 2023-10-25 RX ADMIN — Medication 3 MG: at 20:53

## 2023-10-25 RX ADMIN — PROPRANOLOL HYDROCHLORIDE 10 MG: 10 TABLET ORAL at 20:52

## 2023-10-25 ASSESSMENT — PAIN DESCRIPTION - LOCATION
LOCATION: ABDOMEN
LOCATION: BACK

## 2023-10-25 ASSESSMENT — PAIN SCALES - GENERAL
PAINLEVEL_OUTOF10: 6
PAINLEVEL_OUTOF10: 5
PAINLEVEL_OUTOF10: 7
PAINLEVEL_OUTOF10: 6
PAINLEVEL_OUTOF10: 7
PAINLEVEL_OUTOF10: 7

## 2023-10-25 ASSESSMENT — PAIN DESCRIPTION - DESCRIPTORS
DESCRIPTORS: DULL
DESCRIPTORS: DULL
DESCRIPTORS: TENDER

## 2023-10-25 ASSESSMENT — PAIN DESCRIPTION - ORIENTATION
ORIENTATION: LOWER
ORIENTATION: LOWER;MID
ORIENTATION: LOWER

## 2023-10-25 NOTE — CARE COORDINATION
CM spoke to Paulie Miller w/[facility] about patient returning to [place] and that therapy is recommending 24 hr supervision. CM discussed that she would benefit from assisted living instead d/t the increased physical deficits. Paulie Miller stated that she could come skilled and then transition into AL when a bed comes available. 1435 CM spoke with Naye Ram (son Shoaib Tavares) via telephone and discussed team conference DCP including discharge date and therapy recommendations of 24 hr supervision w/[facility] and that assisted living would be a more safer environment for her. Naye Milner understood and will speak with mother later this evening and update writer.     Tk Braga, RN

## 2023-10-25 NOTE — PROGRESS NOTES
Total Timed Code Min 30    Total Treatment Minutes 30    Individual Treatment Minutes 30    Group Treatment Minutes 0    Co-Treat Minutes 0    Variance/Reason:  N/A    Pain No c/o pain    Pain Intervention [] RN notified  [] Repositioned  [] Intervention offered and patient declined  [x] N/A  [] Other: [] RN notified  [] Repositioned  [] Intervention offered and patient declined  [] N/A  [] Other:   Subjective     Pt alert and cooperative, seen upright in bed for tx session.      Objective:  Goals     Short Term Goals  Time Frame for Short Term Goals: 5 days 10/24/23    Goal 1: Pt will use strategies to complete graded recall tasks (daily events, her room number, 2-3 element short-term, simple mental manipulation) with 70% acc    Functional Recall Tasks  -pt with difficulty recalling tasks from previous date     Sternal Precautions  -pt able to recall acronym - BLT  -pt able to recall no bending, lifting, twisting     Goal 2: Pt will demonstrate functional problem solving for ADLs 90% Functional problem solving   -use of call light here at hospital: indep  -use of \"alarm button\" at independent living: indep (pt reports she wears it all the time and if she presses it, a nurse will come)     [facility] Safety - problem solving questions  -room / bedroom and bathroom: 100% acc indep  -medications and health: 100% acc indep  -kitchen and appliances: 100% acc indep  -pt states she no longer uses stovetop, only a microwave      Goal 3: Pt will use strategies for orientation X4 with min cues   Name: francia (first and last)   : indep  Month: indep   Year: indep   -date: indep used calendar  -SAMPSON: indep used calendar      Other areas targeted: 4-step sequencing task  -pt completed with 100% acc indep      Education:   SLP edu pt re: safety upon d/c      Safety Devices: [x] Call light within reach  [] Chair alarm activated  [x] Bed alarm activated  [] Other: [] Call light within reach  [] Chair alarm activated  [] Bed

## 2023-10-25 NOTE — PROGRESS NOTES
Occupational Therapy  Facility/Department: [facility] ARU  Rehabilitation Occupational Therapy Daily Treatment Note    Date: 10/25/23  Patient Name: Colleen Burns       Room: 8857/7285-57  MRN: [id number]  Account: [de-identified]   : 1935  (80 y.o.) Gender: female       Past Medical History:  has a past medical history of Acute deep vein thrombosis (DVT) of calf muscle vein of left lower extremity ([address]), Arthritis, Cataract, H/O colonoscopy, Hyperlipidemia, and Osteoporosis. Past Surgical History:   has a past surgical history that includes Cataract removal; Colonoscopy; and hip surgery (Left, 2023). Restrictions  Restrictions/Precautions: Fall Risk, General Precautions, Up as Tolerated, ROM Restrictions  Other position/activity restrictions: TLSO when OOB, log rolling  Required Braces or Orthoses  Spinal: Thoracic Lumbar Sacral Orthotics  Spinal Other: OOB, okay to don/doff at EOB and remove for hygiene  Required Braces or Orthoses?: Yes    Subjective  Subjective: Pt in bed upon OT arrival. Denies pain. /62, HR 74, O2 94% on RA.     Objective    ADL  Upper Extremity Dressing  Assistance Level: Minimal assistance  Skilled Clinical Factors: Min A to doff/don TLSO  Lower Extremity Dressing  Assistance Level: Minimal assistance  Skilled Clinical Factors: doff/don brief seated on toilet, Min A to thread BLEs into brief, CGA-Min A for balance while standing to pull brief up/down, cues to keep 1 hand on RW for support during pants management  Toileting  Assistance Level: Minimal assistance  Skilled Clinical Factors: CGA-Min A for standing balance while pulling brief up/down, complete sherrie hygiene seated on toilet  Toilet Transfers  Assistance Level: Contact guard assist  Skilled Clinical Factors: ambulatory transfer onto/off of toilet using RW    Functional Mobility  Device: Rolling walker  Activity: To/From therapy gym  Assistance Level: Stand by assist  Sit to Supine  Assistance Level: Stand by

## 2023-10-26 PROCEDURE — 97110 THERAPEUTIC EXERCISES: CPT

## 2023-10-26 PROCEDURE — 97129 THER IVNTJ 1ST 15 MIN: CPT

## 2023-10-26 PROCEDURE — 1280000000 HC REHAB R&B

## 2023-10-26 PROCEDURE — 97530 THERAPEUTIC ACTIVITIES: CPT

## 2023-10-26 PROCEDURE — 97535 SELF CARE MNGMENT TRAINING: CPT

## 2023-10-26 PROCEDURE — 6370000000 HC RX 637 (ALT 250 FOR IP): Performed by: STUDENT IN AN ORGANIZED HEALTH CARE EDUCATION/TRAINING PROGRAM

## 2023-10-26 PROCEDURE — 6370000000 HC RX 637 (ALT 250 FOR IP): Performed by: PHYSICAL MEDICINE & REHABILITATION

## 2023-10-26 PROCEDURE — 97130 THER IVNTJ EA ADDL 15 MIN: CPT

## 2023-10-26 PROCEDURE — 97116 GAIT TRAINING THERAPY: CPT

## 2023-10-26 RX ORDER — LOPERAMIDE HYDROCHLORIDE 2 MG/1
4 CAPSULE ORAL 4 TIMES DAILY PRN
Status: DISCONTINUED | OUTPATIENT
Start: 2023-10-26 | End: 2023-10-28 | Stop reason: HOSPADM

## 2023-10-26 RX ADMIN — OXYCODONE HYDROCHLORIDE 10 MG: 5 TABLET ORAL at 20:29

## 2023-10-26 RX ADMIN — ACETAMINOPHEN 1000 MG: 500 TABLET ORAL at 20:28

## 2023-10-26 RX ADMIN — APIXABAN 5 MG: 5 TABLET, FILM COATED ORAL at 20:29

## 2023-10-26 RX ADMIN — LOPERAMIDE HYDROCHLORIDE 2 MG: 2 CAPSULE ORAL at 08:01

## 2023-10-26 RX ADMIN — LOPERAMIDE HYDROCHLORIDE 2 MG: 2 CAPSULE ORAL at 14:03

## 2023-10-26 RX ADMIN — APIXABAN 5 MG: 5 TABLET, FILM COATED ORAL at 08:01

## 2023-10-26 RX ADMIN — LOPERAMIDE HYDROCHLORIDE 4 MG: 2 CAPSULE ORAL at 20:29

## 2023-10-26 RX ADMIN — Medication 3 MG: at 20:29

## 2023-10-26 RX ADMIN — ACETAMINOPHEN 1000 MG: 500 TABLET ORAL at 06:14

## 2023-10-26 RX ADMIN — ACETAMINOPHEN 1000 MG: 500 TABLET ORAL at 14:03

## 2023-10-26 RX ADMIN — CITALOPRAM 20 MG: 20 TABLET, FILM COATED ORAL at 20:29

## 2023-10-26 RX ADMIN — PROPRANOLOL HYDROCHLORIDE 10 MG: 10 TABLET ORAL at 20:29

## 2023-10-26 ASSESSMENT — PAIN DESCRIPTION - ORIENTATION: ORIENTATION: LOWER;MID

## 2023-10-26 ASSESSMENT — PAIN SCALES - GENERAL
PAINLEVEL_OUTOF10: 1
PAINLEVEL_OUTOF10: 4
PAINLEVEL_OUTOF10: 0
PAINLEVEL_OUTOF10: 7

## 2023-10-26 ASSESSMENT — PAIN DESCRIPTION - LOCATION: LOCATION: BACK

## 2023-10-26 ASSESSMENT — PAIN DESCRIPTION - DESCRIPTORS: DESCRIPTORS: DULL

## 2023-10-26 NOTE — PROGRESS NOTES
demonstrates insight into needing additional assistance to care for self upon d/c from ARU d/t age and ongoing physical limitations, in addition to suspected baseline cognitive deficits. Continue with goals per POC. Plan: Continue as per plan of care. Additional Information:     Barriers toward progress: Cognitive deficit  Discharge recommendations:  [] Home independently  [x] Home with assistance []  24 hour supervision  [] ECF [] Other:  Continued Tx Upon Discharge: ? [] Yes [] No [x] TBD based on progress while on ARU [] Vital Stim indicated [] Other:   Estimated discharge date: 10/28/23    Interventions used this date:  [] Speech/Language Treatment  [] Instruction in HEP [] Group [] Dysphagia Treatment [x] Cognitive Treatment   [] Other: Total Time Breakdown / Charges    Time in Time out Total Time / units   Cognitive Tx 1230 1300 30 mins/ 2 units    Speech Tx -- -- --   Dysphagia Tx -- -- --       Electronically Signed by     [facility]. Thiago Abdalla M.A.  [address] [place] [address]  Speech-Language Pathologist

## 2023-10-26 NOTE — PROGRESS NOTES
Physical Therapy  Facility/Department: [facility]  Rehabilitation Physical Therapy Treatment Note    NAME: Joyce Stokes  : 1935 (80 y.o.)  MRN: [id number]  CODE STATUS: Full Code    Date of Service: 10/26/23       Restrictions:  Restrictions/Precautions: Fall Risk, General Precautions, Up as Tolerated, ROM Restrictions  Required Braces or Orthoses  Spinal: Thoracic Lumbar Sacral Orthotics  Spinal Other: OOB, okay to don/doff at EOB and remove for hygiene  Position Activity Restriction  Spinal Precautions: No Bending, No Lifting, No Twisting  Other position/activity restrictions: TLSO when OOB, log rolling     SUBJECTIVE  Subjective  Subjective: pt found in bed, incontinent of minimal bowel  Pain: 7/10 low back pain          OBJECTIVE  Cognition  Overall Cognitive Status: Exceptions  Arousal/Alertness: Appropriate responses to stimuli  Following Commands: Follows one step commands with increased time; Follows one step commands with repetition  Attention Span: Appears intact  Memory: Decreased short term memory;Decreased recall of precautions  Safety Judgement: Decreased awareness of need for assistance  Problem Solving: Assistance required to generate solutions;Assistance required to implement solutions  Insights: Decreased awareness of deficits  Initiation: Requires cues for some  Sequencing: Requires cues for some  Orientation  Overall Orientation Status: Within Functional Limits  Orientation Level: Oriented to situation;Oriented to time;Oriented to place;Oriented to person    Functional Mobility  Roll Left  Assistance Level: Supervision  Skilled Clinical Factors: with bed rail while completing pericare and clothing management 2* incontinennce  Roll Right  Assistance Level: Supervision  Skilled Clinical Factors: with bed rail while completing pericare and clothing management 2* incontinennce  Sit to Supine  Assistance Level: Supervision  Supine to Sit  Assistance Level: Supervision  Balance  Sitting Balance:

## 2023-10-26 NOTE — PROGRESS NOTES
Occupational Therapy  Facility/Department: [facility] ARU  Rehabilitation Occupational Therapy Daily Treatment Note    Date: 10/26/23  Patient Name: Derik Louie       Room: 7117/0394-24  MRN: [id number]  Account: [de-identified]   : 1935  (80 y.o.) Gender: female     Past Medical History:  has a past medical history of Acute deep vein thrombosis (DVT) of calf muscle vein of left lower extremity ([address]), Arthritis, Cataract, H/O colonoscopy, Hyperlipidemia, and Osteoporosis. Past Surgical History:   has a past surgical history that includes Cataract removal; Colonoscopy; and hip surgery (Left, 2023). Restrictions  Restrictions/Precautions: Fall Risk, General Precautions, Up as Tolerated, ROM Restrictions  Other position/activity restrictions: TLSO when OOB, log rolling  Required Braces or Orthoses  Spinal: Thoracic Lumbar Sacral Orthotics  Spinal Other: OOB, okay to don/doff at EOB and remove for hygiene  Required Braces or Orthoses?: Yes    Subjective  Subjective: Pt in bed upon OT arrival. Denies pain but reports frequent diarehea throughout the day today. /62, HR 67, O2 95% on RA. Objective    ADL  Upper Extremity Bathing  Assistance Level: Independent  Skilled Clinical Factors: seated on TTB  Lower Extremity Bathing  Assistance Level: Minimal assistance  Skilled Clinical Factors: seated on TTB, figure 4 sit to wash lower BLEs,  cues to remain seated and utilize lateral leans to wash buttocks, assist to wash buttocks 2/2 incontient episode of BM in shower  Upper Extremity Dressing  Skilled Clinical Factors: Min A to doff/don TLSO  Lower Extremity Dressing  Assistance Level: Maximum assistance  Skilled Clinical Factors:  Mod-Max A to don brief x2 instances 2/2 incontient episodes of diarrhea  Putting On/Taking Off Footwear  Assistance Level: Dependent  Skilled Clinical Factors: DEP 2/2 time constraints  Toileting  Assistance Level: Maximum assistance  Skilled Clinical Factors: Multiple episodes of

## 2023-10-26 NOTE — PROGRESS NOTES
10/25/2023 06:23 AM    GLUCOSE 96 10/25/2023 06:23 AM    CALCIUM 9.2 10/25/2023 06:23 AM        Therapy progress:  Physical therapy:  Bed Mobility:     Sit>supine:  Assistance Level: Supervision  Supine>sit:  Assistance Level: Supervision  Skilled Clinical Factors: cues for log rolling  Transfers:  Surface: Wheelchair  Additional Factors: Verbal cues, Hand placement cues, Increased time to complete  Device: Walker (RW and // bars)  Sit>stand:  Assistance Level: Supervision  Skilled Clinical Factors: from EOB and chair to RW with cues for hand placement  Stand>sit:  Assistance Level: Supervision  Skilled Clinical Factors: Pt completes multiple t/f with RW and in // bars with SBA, min cues for hand placement  Bed<>chair     Stand Pivot:     Lateral transfer:     Car transfer:     Ambulation:  Surface: Level surface  Device: Rolling walker  Distance: 158 ft  Activity: Within Unit  Assistance Level: Stand by assist  Gait Deviations: Slow sandeep, Narrow base of support, Decreased step length bilateral, Decreased heel strike right, Decreased heel strike left, Decreased weight shift left  Skilled Clinical Factors: Slightly antalgic pattern, L decreased WS and stance, B decreased heek strike, B decreased hip extension. trendelenberg R LE. Grossly SBA without LOB. Stairs:     Curb: Wheelchair:       Occupational therapy:   Feeding     Grooming/Oral Hygiene  Assistance Level: Independent  Skilled Clinical Factors: completed oral care seated on TTB during shower  UE Bathing  Assistance Level:  Independent  Skilled Clinical Factors: seated on TTB  LE Bathing  Assistance Level: Supervision  Skilled Clinical Factors: seated on TTB, figure 4 sit ot wash lower BLEs, 1 cue to remain seated and utilize lateral leans to wash buttocks  UE Dressing  Assistance Level: Minimal assistance  Skilled Clinical Factors: Min A to doff/don TLSO  LE Dressing  Equipment Provided: Reachers  Assistance Level: Minimal assistance  Skilled Clinical

## 2023-10-27 LAB
ANION GAP SERPL CALCULATED.3IONS-SCNC: 8 MMOL/L (ref 3–16)
BASOPHILS # BLD: 0 K/UL (ref 0–0.2)
BASOPHILS NFR BLD: 0.9 %
BUN SERPL-MCNC: 10 MG/DL (ref 7–20)
CALCIUM SERPL-MCNC: 8.6 MG/DL (ref 8.3–10.6)
CHLORIDE SERPL-SCNC: 102 MMOL/L (ref 99–110)
CO2 SERPL-SCNC: 29 MMOL/L (ref 21–32)
CREAT SERPL-MCNC: <0.5 MG/DL (ref 0.6–1.2)
DEPRECATED RDW RBC AUTO: 17.6 % (ref 12.4–15.4)
EOSINOPHIL # BLD: 0.2 K/UL (ref 0–0.6)
EOSINOPHIL NFR BLD: 4.6 %
GFR SERPLBLD CREATININE-BSD FMLA CKD-EPI: >60 ML/MIN/{1.73_M2}
GLUCOSE SERPL-MCNC: 84 MG/DL (ref 70–99)
HCT VFR BLD AUTO: 36.2 % (ref 36–48)
HGB BLD-MCNC: 11.9 G/DL (ref 12–16)
LYMPHOCYTES # BLD: 1.6 K/UL (ref 1–5.1)
LYMPHOCYTES NFR BLD: 33.8 %
MCH RBC QN AUTO: 29.5 PG (ref 26–34)
MCHC RBC AUTO-ENTMCNC: 32.9 G/DL (ref 31–36)
MCV RBC AUTO: 89.8 FL (ref 80–100)
MONOCYTES # BLD: 0.8 K/UL (ref 0–1.3)
MONOCYTES NFR BLD: 17.9 %
NEUTROPHILS # BLD: 2 K/UL (ref 1.7–7.7)
NEUTROPHILS NFR BLD: 42.8 %
PLATELET # BLD AUTO: 231 K/UL (ref 135–450)
PMV BLD AUTO: 8 FL (ref 5–10.5)
POTASSIUM SERPL-SCNC: 3.8 MMOL/L (ref 3.5–5.1)
RBC # BLD AUTO: 4.03 M/UL (ref 4–5.2)
SODIUM SERPL-SCNC: 139 MMOL/L (ref 136–145)
WBC # BLD AUTO: 4.6 K/UL (ref 4–11)

## 2023-10-27 PROCEDURE — 97535 SELF CARE MNGMENT TRAINING: CPT

## 2023-10-27 PROCEDURE — 97116 GAIT TRAINING THERAPY: CPT

## 2023-10-27 PROCEDURE — 1280000000 HC REHAB R&B

## 2023-10-27 PROCEDURE — 6370000000 HC RX 637 (ALT 250 FOR IP): Performed by: STUDENT IN AN ORGANIZED HEALTH CARE EDUCATION/TRAINING PROGRAM

## 2023-10-27 PROCEDURE — 36415 COLL VENOUS BLD VENIPUNCTURE: CPT

## 2023-10-27 PROCEDURE — 97130 THER IVNTJ EA ADDL 15 MIN: CPT

## 2023-10-27 PROCEDURE — 6370000000 HC RX 637 (ALT 250 FOR IP): Performed by: PHYSICAL MEDICINE & REHABILITATION

## 2023-10-27 PROCEDURE — 97129 THER IVNTJ 1ST 15 MIN: CPT

## 2023-10-27 PROCEDURE — 80048 BASIC METABOLIC PNL TOTAL CA: CPT

## 2023-10-27 PROCEDURE — 85025 COMPLETE CBC W/AUTO DIFF WBC: CPT

## 2023-10-27 PROCEDURE — 97110 THERAPEUTIC EXERCISES: CPT

## 2023-10-27 PROCEDURE — 97530 THERAPEUTIC ACTIVITIES: CPT

## 2023-10-27 RX ORDER — LOPERAMIDE HYDROCHLORIDE 2 MG/1
4 CAPSULE ORAL 4 TIMES DAILY PRN
Qty: 30 CAPSULE | Refills: 1 | Status: SHIPPED | OUTPATIENT
Start: 2023-10-27 | End: 2023-11-06

## 2023-10-27 RX ORDER — OXYCODONE HYDROCHLORIDE 5 MG/1
5 TABLET ORAL EVERY 6 HOURS PRN
Qty: 20 TABLET | Refills: 0 | Status: SHIPPED | OUTPATIENT
Start: 2023-10-27 | End: 2023-10-30

## 2023-10-27 RX ADMIN — Medication 3 MG: at 20:15

## 2023-10-27 RX ADMIN — CITALOPRAM 20 MG: 20 TABLET, FILM COATED ORAL at 20:15

## 2023-10-27 RX ADMIN — LOPERAMIDE HYDROCHLORIDE 4 MG: 2 CAPSULE ORAL at 08:01

## 2023-10-27 RX ADMIN — ACETAMINOPHEN 1000 MG: 500 TABLET ORAL at 20:14

## 2023-10-27 RX ADMIN — LOPERAMIDE HYDROCHLORIDE 4 MG: 2 CAPSULE ORAL at 10:02

## 2023-10-27 RX ADMIN — PROPRANOLOL HYDROCHLORIDE 10 MG: 10 TABLET ORAL at 20:15

## 2023-10-27 RX ADMIN — APIXABAN 5 MG: 5 TABLET, FILM COATED ORAL at 20:15

## 2023-10-27 RX ADMIN — ACETAMINOPHEN 1000 MG: 500 TABLET ORAL at 06:22

## 2023-10-27 RX ADMIN — PROPRANOLOL HYDROCHLORIDE 10 MG: 10 TABLET ORAL at 07:58

## 2023-10-27 RX ADMIN — APIXABAN 5 MG: 5 TABLET, FILM COATED ORAL at 07:58

## 2023-10-27 RX ADMIN — OXYCODONE HYDROCHLORIDE 10 MG: 5 TABLET ORAL at 10:02

## 2023-10-27 ASSESSMENT — PAIN DESCRIPTION - LOCATION: LOCATION: BACK

## 2023-10-27 ASSESSMENT — PAIN DESCRIPTION - DESCRIPTORS: DESCRIPTORS: DULL;ACHING

## 2023-10-27 ASSESSMENT — PAIN SCALES - GENERAL
PAINLEVEL_OUTOF10: 7
PAINLEVEL_OUTOF10: 0

## 2023-10-27 ASSESSMENT — PAIN DESCRIPTION - ORIENTATION: ORIENTATION: LOWER

## 2023-10-27 NOTE — DISCHARGE INSTR - COC
Elimination:  Continence: Bowel: Yes  Bladder: Yes  Urinary Catheter: None   Colostomy/Ileostomy/Ileal Conduit: No       Date of Last BM: 10/27/2023    Intake/Output Summary (Last 24 hours) at 10/27/2023 0937  Last data filed at 10/26/2023 1942  Gross per 24 hour   Intake 720 ml   Output --   Net 720 ml     I/O last 3 completed shifts: In: 12 [P.O.:960]  Out: -     Safety Concerns: At Risk for Falls    Impairments/Disabilities:      None    Nutrition Therapy:  Current Nutrition Therapy:   - Oral Diet:  General    Routes of Feeding: Oral  Liquids:  Thin Liquids  Daily Fluid Restriction: no  Last Modified Barium Swallow with Video (Video Swallowing Test): not done    Treatments at the Time of Hospital Discharge:   Respiratory Treatments: ***  Oxygen Therapy:  {Therapy; copd oxygen:77522}  Ventilator:    {MH CC Vent MCFT:677321796}    Rehab Therapies: {THERAPEUTIC INTERVENTION:3471485406}  Weight Bearing Status/Restrictions: [address] [facility] Weight Bearin}  Other Medical Equipment (for information only, NOT a DME order):  {EQUIPMENT:835993209}  Other Treatments: ***    Patient's personal belongings (please select all that are sent with patient):       RN SIGNATURE:  Electronically signed by Beto Dailey RN on 10/28/23 at 10:13 AM EDT    CASE MANAGEMENT/SOCIAL WORK SECTION    Inpatient Status Date: 10/17/23    Readmission Risk Assessment Score: 13%  Readmission Risk              Risk of Unplanned Readmission:  13       Discharging to Facility/ Agency     [facility] [address] [place] [address]   [phone]    / signature: Electronically signed by Gali Laurent RN on 10/27/23 at 9:56 AM EDT    PHYSICIAN SECTION    Prognosis: Good    Condition at Discharge: Stable    Rehab Potential (if transferring to Rehab): Good    Recommended Labs or Other Treatments After Discharge: PT,OT    Physician Certification: I certify the above information

## 2023-10-27 NOTE — CARE COORDINATION
Case Management Discharge Summary  [facility] - Acute Rehab Unit    Patient:Marina Morales     Rehab Dx/Hx: Closed compression fracture of body of L1 vertebra ([address]) [R48.956S]       Today's Date: 10/27/2023    Discharge to: Anticipating Dc on 10/28/23: To [facility] completed:  [] No       [] Yes     [] N/A   Hospital Exemption Notification (HENS) completed:  [] No       [x] Yes     [] N/A    Document ID : [id number]   IMM given:  10/27/23       New Durable Medical Equipment ordered/agency:  Defer to SNF   Transportation:  Medical Transport explained to pt/family. Pt/family voice no agency preference. Agency used:[facility]  [address] [facility] up time:3663-3207 am  Ambulance form completed: [] No       [x] Yes   [] N/A       Confirmed discharge plan with:  Patient: [] No       [x] Yes  Family:  [] No       [x] Yes      Name:sebastien  Contact number: [phone]   Facility/Agency, name:  [address] [place] [address] [address]   [phone]  CALEB/AVS faxed  Phone number for report to facility: [phone]  RN, name: W/E       Has lack of transportation kept you from medical appointments, meetings, work, or ADL's? [] Yes, it has kept me from medical appointments or from getting my meds  [] Yes, It has kept me from non-medical meetings, appointments, work, or getting things I need  [x] No  [] Pt unable to respond  [] Pt declines to respond     How often do you need to have someone help you when you read instructions, pamphlets, or other written material from your doctor or pharmacy? [] Never                             [] Always  [] Rarely                            [] Patient declines to respond  [x] Sometimes                    [] Patient unable to respond  [] Often       Note: Discharging nurse to complete CALEB, reconcile AVS, and place final copy with patient's discharge packet.  RN to ensure that written prescriptions for  Level II medications are

## 2023-10-27 NOTE — PROGRESS NOTES
Occupational Therapy  Discharge Summary     Name:Marina Ventura  TAMMI:[id number]  :1935  Treatment Diagnosis: Decreased balance and gait  Diagnosis: L1 compression fx    Restrictions/Precautions  Restrictions/Precautions: Fall Risk, General Precautions, Up as Tolerated, ROM Restrictions  Required Braces or Orthoses?: Yes           Position Activity Restriction  Spinal Precautions: No Bending, No Lifting, No Twisting  Other position/activity restrictions: TLSO when OOB, log rolling     Goals:   Short Term Goals  Time Frame for Short Term Goals: 10/21  Short Term Goal 1: Pt will perform toilet transfer with SPV-NOT MET  Short Term Goal 2: Pt will perform toileting task with SPV-NOT MET  Short Term Goal 3: Pt will perfrom LB bathing with SPV using AE PRN-MET 10/20  Short Term Goal 4: Pt will perfrom LB dressign with min A and AE PRN-MET 10/20  Short Term Goal 5: Pt will doff/don TLSO with min A-MET 10/23  Long Term Goals  Time Frame for Long Term Goals : 10/26-EXTEND LTG to discharge-  Long Term Goal 1: Pt will perform TB bathing mod I-NOT MET  Long Term Goal 2: Pt will perform TB dressing including TLSO with mod I-NOT MET  Long Term Goal 3: Pt will perform toileting task and transfer with mod I-NOT MET  Long Term Goal 4: Pt will perform simple IADL task with SPV-NOT MET  Long Term Goal 5: Pt will verbalize spinal precautions with 0 verbal cues-MET 10/23    Pt. Met 3/5 short term goals and 1/5 long term goals. ADL:  Grooming/Oral Hygiene  Assistance Level:  Independent  Skilled Clinical Factors: completed oral care seated on TTB during shower  Upper Extremity Bathing  Assistance Level: Supervision  Skilled Clinical Factors: seated on TTB, Min cues for spinal precuations today  Lower Extremity Bathing  Equipment Provided: Long-handled sponge  Assistance Level: Set-up  Skilled Clinical Factors: seated on TTB, used LHS to wash lower BLEs  Upper Extremity Dressing  Assistance Level: Supervision  Skilled Clinical

## 2023-10-27 NOTE — PROGRESS NOTES
[facility]: ACUTE REHAB UNIT  SPEECH-LANGUAGE PATHOLOGY      [x] Daily  [] Weekly Care Conference Note  [x] Discharge    Patient:Marina Byrne      :1935  [facility]  Rehab Dx/Hx: Closed compression fracture of body of L1 vertebra ([address]) [S32.010A]    Precautions: falls  Home situation: lives alone, retired, primarily responsible for laundry and cleaning, son and grandson manage finances, facility manages her medications due to impaired vision   ST Dx: [] Aphasia  [] Dysarthria  [] Apraxia   [] Oropharyngeal dysphagia [x] Cognitive Impairment  [] Other:   Date of Admit: 10/17/2023  Room #: 0165/0165-01    Current functional status (updated daily):         Pt being seen for : [] Speech/Language Treatment  [] Dysphagia Treatment [x] Cognitive Treatment  [] Other:  Communication: [x]WFL  [] Aphasia  [] Dysarthria  [] Apraxia  [] Pragmatic Impairment [] Non-verbal  [] Hearing Loss  [] Other:   Cognition: [] WFL  [] Mild  [x] Moderate  [] Severe [] Unable to Assess  [] Other:  Memory: [] WFL  [] Mild  [x] Moderate  [] Severe [] Unable to Assess  [] Other:  Behavior: [x] Alert  [x] Cooperative  [x]  Pleasant  [] Confused  [] Agitated  [] Uncooperative  [] Distractible [] Motivated  [] Self-Limiting [] Anxious  [] Other:  Endurance:  [x] Adequate for participation in SLP sessions  [] Reduced overall  [] Lethargic  [] Other:  Safety: [x] No concerns at this time  [x] Reduced insight into deficits  [x]  Reduced safety awareness [] Not following call light procedures  [] Unable to Assess  [] Other:    Current Diet Order:ADULT DIET;  Regular  ADULT ORAL NUTRITION SUPPLEMENT; Breakfast; Standard High Calorie/High Protein Oral Supplement  Swallowing Precautions: Sit up for all meals and thereafter for 30 minutes, Eat with small bites (1/2 tsp; 1 tsp), Alternate solids with liquids, and Eat small meals throughout the day (5x/day)        Date: 10/27/2023       Tx session 1  [id number]   Tx session 2  All tx needs met in session 1

## 2023-10-27 NOTE — CARE COORDINATION
CM spoke with Julita Hawkins (son) via telephone confirming DCP to [facility] head skilled then transfer to Assisted Living once bed is available. CM discussed transport either he will take her or writer can set up transport. Julita Hawkins stated ambulance transport would work better. MITRA acknowledged.     Tristan Cárdenas RN

## 2023-10-27 NOTE — PROGRESS NOTES
Occupational Therapy  Facility/Department: [facility]  Rehabilitation Occupational Therapy Daily Treatment Note    Date: 10/27/23  Patient Name: America Miller       Room: 99/5397-17  MRN: [id number]  Account: [de-identified]   : 1935  (80 y.o.) Gender: female     Past Medical History:  has a past medical history of Acute deep vein thrombosis (DVT) of calf muscle vein of left lower extremity ([address]), Arthritis, Cataract, H/O colonoscopy, Hyperlipidemia, and Osteoporosis. Past Surgical History:   has a past surgical history that includes Cataract removal; Colonoscopy; and hip surgery (Left, 2023). Restrictions  Restrictions/Precautions: Fall Risk, General Precautions, Up as Tolerated, ROM Restrictions  Other position/activity restrictions: TLSO when OOB, log rolling  Required Braces or Orthoses  Spinal: Thoracic Lumbar Sacral Orthotics  Spinal Other: OOB, okay to don/doff at EOB and remove for hygiene  Required Braces or Orthoses?: Yes    Subjective  Subjective: Pt in bed upon OT arrival. Denies pain. /66, HR 85, O2 92% on RA      Objective      ADL  Grooming/Oral Hygiene  Assistance Level:  Independent  Skilled Clinical Factors: completed oral care seated on TTB during shower  Upper Extremity Bathing  Assistance Level: Supervision  Skilled Clinical Factors: seated on TTB, Min cues for spinal precuations today  Lower Extremity Bathing  Equipment Provided: Long-handled sponge  Assistance Level: Set-up  Skilled Clinical Factors: seated on TTB, used LHS to wash lower BLEs  Upper Extremity Dressing  Assistance Level: Supervision  Skilled Clinical Factors: doff/don shirt with setup, verbal cues to doff/don TLSO  Lower Extremity Dressing  Assistance Level: Minimal assistance  Skilled Clinical Factors: Min A for balance while manging brief over hips  Putting On/Taking Off Footwear  Equipment Provided: Sock aid  Assistance Level: Set-up  Toilet Transfers  Equipment: Standard toilet  Assistance Level: Contact

## 2023-10-27 NOTE — PROGRESS NOTES
Physical Therapy  Facility/Department: [facility]  Rehabilitation Physical Therapy Treatment Note    NAME: Domingo Noguera  : 1935 (80 y.o.)  MRN: [id number]  CODE STATUS: Full Code    Date of Service: 10/27/23       Restrictions:  Restrictions/Precautions: Fall Risk, General Precautions, Up as Tolerated, ROM Restrictions  Required Braces or Orthoses  Spinal: Thoracic Lumbar Sacral Orthotics  Spinal Other: OOB, okay to don/doff at EOB and remove for hygiene  Position Activity Restriction  Spinal Precautions: No Bending, No Lifting, No Twisting  Other position/activity restrictions: TLSO when OOB, log rolling     SUBJECTIVE  Subjective  Subjective: pt found in bed. session limited by pt's 1 bout of incontinence and pt having to use commode urgently 2 more times following 2* diahhrea  Pain: 7/10 back pain       OBJECTIVE  Cognition  Overall Cognitive Status: Exceptions  Arousal/Alertness: Appropriate responses to stimuli  Following Commands: Follows one step commands with increased time; Follows one step commands with repetition  Attention Span: Appears intact  Memory: Decreased short term memory;Decreased recall of precautions  Safety Judgement: Decreased awareness of need for assistance  Problem Solving: Assistance required to generate solutions;Assistance required to implement solutions  Insights: Decreased awareness of deficits  Initiation: Requires cues for some  Sequencing: Requires cues for some  Orientation  Overall Orientation Status: Within Functional Limits  Orientation Level: Oriented to situation;Oriented to time;Oriented to place;Oriented to person    Functional Mobility  Roll Left  Assistance Level: Modified independent  Roll Right  Assistance Level: Modified independent  Sit to Supine  Assistance Level: Modified independent  Supine to Sit  Assistance Level: Modified independent  Balance  Sitting Balance: Supervision  Standing Balance: Contact guard assistance  Standing Balance  Activity: pt with yes

## 2023-10-28 VITALS
WEIGHT: 109.57 LBS | BODY MASS INDEX: 18.71 KG/M2 | DIASTOLIC BLOOD PRESSURE: 56 MMHG | OXYGEN SATURATION: 93 % | RESPIRATION RATE: 18 BRPM | HEIGHT: 64 IN | HEART RATE: 85 BPM | TEMPERATURE: 98.1 F | SYSTOLIC BLOOD PRESSURE: 111 MMHG

## 2023-10-28 PROCEDURE — 6370000000 HC RX 637 (ALT 250 FOR IP): Performed by: PHYSICAL MEDICINE & REHABILITATION

## 2023-10-28 PROCEDURE — 6370000000 HC RX 637 (ALT 250 FOR IP): Performed by: STUDENT IN AN ORGANIZED HEALTH CARE EDUCATION/TRAINING PROGRAM

## 2023-10-28 RX ADMIN — LOPERAMIDE HYDROCHLORIDE 4 MG: 2 CAPSULE ORAL at 05:22

## 2023-10-28 RX ADMIN — OXYCODONE HYDROCHLORIDE 5 MG: 5 TABLET ORAL at 05:16

## 2023-10-28 RX ADMIN — ACETAMINOPHEN 1000 MG: 500 TABLET ORAL at 05:15

## 2023-10-28 RX ADMIN — PROPRANOLOL HYDROCHLORIDE 10 MG: 10 TABLET ORAL at 08:09

## 2023-10-28 RX ADMIN — APIXABAN 5 MG: 5 TABLET, FILM COATED ORAL at 08:09

## 2023-10-28 ASSESSMENT — PAIN DESCRIPTION - LOCATION: LOCATION: BACK

## 2023-10-28 ASSESSMENT — PAIN DESCRIPTION - DESCRIPTORS: DESCRIPTORS: ACHING;DISCOMFORT

## 2023-10-28 ASSESSMENT — PAIN SCALES - GENERAL: PAINLEVEL_OUTOF10: 5

## 2023-10-28 NOTE — PROGRESS NOTES
Report called to Jayy at [facility] who will be assuming care of the pt when she arrives. All questions answered. Pt awaiting transport at this time.

## 2023-10-28 NOTE — PROGRESS NOTES
[facility] Discharge [address] Acute Rehab Unit    Patient:Marina Morales     Rehab Dx/Hx: Closed compression fracture of body of L1 vertebra ([address]) Jodee Espinoza   [facility]:[birth year]85  BUA:[id number]  Date of Admit: 10/17/2023     Pain Effect on Sleep:  Over the past 5 days, how much of the time has pain made it hard for you to sleep at night?  []  0. Does not apply - I have not had any pain or hurting in the past 5 days  [x]  1. Rarely or not at all  []  2. Occasionally  []  3. Frequently  []  4. Almost constantly  []  8. Unable to answer    Over the past 5 days, how often have you limited your participation in rehabilitation therapy sessions due to pain?  []  0. Does not apply - I have not received rehabilitation therapy in the past 5 days  [x]  1. Rarely or not at all  []  2. Occasionally  []  3. Frequently  []  4. Almost constantly  []  8. Unable to answer    Pain Interference with Day-to-Day Activities:  Over the past 5 days, how often have you limited your day-to-day activities (excluding rehabilitation therapy session)? [x]  1. Rarely or not at all  []  2. Occasionally  []  3. Frequently  []  4. Almost constantly  []  8. Unable to answer      High-Risk Drug Classes: Use and Indication   Is taking: Check if the pt is taking any medications by pharmacological classification  Indication noted: If column 1 is checked, check if there is an indication noted for all meds in the drug class Is taking  (check all that apply) Indication noted (check all that apply)   Antipsychotic [] []   Anticoagulant [x] [x]   Antibiotic [] []   Opioid [] []   Antiplatelet [] []   Hypoglycemic (including insulin) [] []   None of the above [] []     Special Treatments, Procedures, and Programs   Check all of the following treatments, procedures, and programs that apply on discharge. On discharge (check all that apply)   Cancer Treatments    A1. Chemotherapy []   A2. IV []   A3. Oral []   A10.  Other []   B1. Radiation []

## 2023-10-28 NOTE — PROGRESS NOTES
ACUTE REHAB UNIT: [address] [place] [address]    Patient:Marina Morales     Rehab Dx/Hx: Closed compression fracture of body of L1 vertebra [facility]) Kurtis Mckenna   MJN:8/09/9821  RSO:[id number]  Date of Admit: 10/17/2023   Date of Discharge: 10/28/2023    Subjective:   Order for patient discharge. Reviewed discharge summary (AVS) with patient including medications, physical instructions (safety) and diet. Pt in stable condition. Reconciled Medication List - Patient:   Medications were reviewed by RN at time of discharge with patient and/or family:  []  Via EMR   []  Via health information exchange  []  Verbal (e.g. in person, telephone, video conferencing)  [x]  Paper-based (e.g. fax, copies, printouts)   []  Other Methods (e.g. texting, email, CDs)    Reconciled Medication List - Subsequent provider:   [] No, current reconciled medication list not provided to the subsequent provider. [x] Yes, current reconciled medication list provided to the subsequent provider. [] Via EMR    [] Via health information exchange  [] Verbal (e.g. in person, telephone, video conferencing)  [x] Paper-based (e.g. fax, copies, printouts)   [] Other Methods (e.g. texting, email, CDs)    Discharge disposition:  Pt was discharged via:  []  Wheelchair  [x]  Stretcher  []  Safe ambulation and use of assistive device  []  Other:     Pt was discharged to:  []  Private car  [x]  Transportation service to next destination  []  Other:     Discharge destination:  []  Home  [x]  [address]  []  [address] [facility]  [x]  Other: [facility]        Discharge BIMS:  Number of word repeated after first attempt:  []  0. None []  1. One []  2. Two [x]  3. Three    Able to report correct year:  []  0. Missed by >5 years, or no answer  []  1. Missed by 2-5 years  []  2. Missed by 1 year  [x]  3. Correct    Able to report correct month:   []  0. Missed by >1 month, or no answer  []  1. Missed by 6 days to 1 month  [x]  2.  Accurate

## 2023-10-28 NOTE — DISCHARGE SUMMARY
Physical Medicine & Rehabilitation  Discharge Summary     Patient Identification:  Derik Louie  : 1935  Admit date: 10/17/2023  Discharge date:   10/28/2023  Attending provider: Berna Mendez MD        Primary care provider: DEBBIE Arteaga CNP     Discharge Diagnoses:   Patient Active Problem List   Diagnosis    Arthritis    Osteoporosis    Cataract    H/O colonoscopy    Hyperlipidemia    Vertigo    Macular degeneration    IT band syndrome    Compression fracture of body of thoracic vertebra (HCC)    Left hip pain    Closed nondisplaced intertrochanteric fracture of left femur (HCC)    Paroxysmal atrial fibrillation (HCC)    Acute deep vein thrombosis (DVT) of calf muscle vein of left lower extremity (HCC)    Low back pain    Compression fracture of L1 lumbar vertebra, sequela    Closed compression fracture of body of L1 vertebra (HCC)    Moderate malnutrition ([address])       Discharge Functional Status:      Physical therapy:  Supine to Sit: Independent  Sit to Supine: Independent      Sit to Stand: Supervision or touching assistance  Chair/Bed to Chair Transfer: Supervision or touching assistance  Car Transfer: Supervision or touching assistance     Ambulation 10 ft: Supervision or touching assistance  Ambulation 50 ft: Supervision or touching assistance  Ambulation 150 ft: Supervision or touching assistance  Stairs - 1 Step: Supervision or touching assistance  Stairs - 4 Step: Supervision or touching assistance  Stairs - 12 Step:      Occupational therapy:  Eating: Independent  Oral Hygiene: Independent  Bathing: Supervision or touching assistance  Upper Body Dressing: Supervision or touching assistance  Lower Body Dressing: Partial/moderate assistance     Toilet Transfer: Supervision or touching assistance  Toilet Hygiene: Substantial/maximal assistance    Speech therapy:     Pt pleasant and willing to engage in therapy tasks.  Pt independently refers to external memory aids to orient to time

## 2023-10-30 ENCOUNTER — PATIENT MESSAGE (OUTPATIENT)
Dept: INTERNAL MEDICINE CLINIC | Age: 88
End: 2023-10-30

## 2023-10-30 NOTE — TELEPHONE ENCOUNTER
From: Emy Tinoco  To: Ever Hdz  Sent: 10/30/2023 10:18 AM EDT  Subject: Octavia Dawn - citalopram    Good morning,     My grandmother, Octavia Dawn, is still suffering from diarrhea with no pinpointed cause. I cannot say with certainty that dietary changes have had meaningful effect - she eats what she wants, after all. But is it possible that a medication might be causing this? Citalopram is noted for a wide range of side effects, diarrhea among them. I think that her issues might have worsened since she started taking this. What are your thoughts?     Thank you,   haris Muir and [facility]

## 2023-11-07 ENCOUNTER — APPOINTMENT (OUTPATIENT)
Dept: CT IMAGING | Age: 88
DRG: 641 | End: 2023-11-07
Payer: MEDICARE

## 2023-11-07 ENCOUNTER — HOSPITAL ENCOUNTER (INPATIENT)
Age: 88
LOS: 7 days | Discharge: NURSING, REHAB OR LONG-TERM CARE | DRG: 641 | End: 2023-11-14
Attending: STUDENT IN AN ORGANIZED HEALTH CARE EDUCATION/TRAINING PROGRAM | Admitting: INTERNAL MEDICINE
Payer: MEDICARE

## 2023-11-07 DIAGNOSIS — Z71.89 GOALS OF CARE, COUNSELING/DISCUSSION: ICD-10-CM

## 2023-11-07 DIAGNOSIS — E87.6 HYPOKALEMIA: ICD-10-CM

## 2023-11-07 DIAGNOSIS — R62.7 FAILURE TO THRIVE IN ADULT: ICD-10-CM

## 2023-11-07 DIAGNOSIS — R19.7 DIARRHEA, UNSPECIFIED TYPE: Primary | ICD-10-CM

## 2023-11-07 LAB
ALBUMIN SERPL-MCNC: 3.3 G/DL (ref 3.4–5)
ALP SERPL-CCNC: 92 U/L (ref 40–129)
ALT SERPL-CCNC: 6 U/L (ref 10–40)
ANION GAP SERPL CALCULATED.3IONS-SCNC: 9 MMOL/L (ref 3–16)
AST SERPL-CCNC: 18 U/L (ref 15–37)
BASOPHILS # BLD: 0 K/UL (ref 0–0.2)
BASOPHILS NFR BLD: 0.7 %
BILIRUB DIRECT SERPL-MCNC: <0.2 MG/DL (ref 0–0.3)
BILIRUB INDIRECT SERPL-MCNC: ABNORMAL MG/DL (ref 0–1)
BILIRUB SERPL-MCNC: 0.4 MG/DL (ref 0–1)
BUN SERPL-MCNC: 11 MG/DL (ref 7–20)
C DIFF TOX A+B STL QL IA: NORMAL
CALCIUM SERPL-MCNC: 8.8 MG/DL (ref 8.3–10.6)
CHLORIDE SERPL-SCNC: 100 MMOL/L (ref 99–110)
CO2 SERPL-SCNC: 31 MMOL/L (ref 21–32)
CREAT SERPL-MCNC: <0.5 MG/DL (ref 0.6–1.2)
CRP SERPL-MCNC: 21.8 MG/L (ref 0–5.1)
DEPRECATED RDW RBC AUTO: 17.5 % (ref 12.4–15.4)
EOSINOPHIL # BLD: 0.2 K/UL (ref 0–0.6)
EOSINOPHIL NFR BLD: 3.5 %
ERYTHROCYTE [SEDIMENTATION RATE] IN BLOOD BY WESTERGREN METHOD: 28 MM/HR (ref 0–30)
GFR SERPLBLD CREATININE-BSD FMLA CKD-EPI: >60 ML/MIN/{1.73_M2}
GLUCOSE SERPL-MCNC: 102 MG/DL (ref 70–99)
HCT VFR BLD AUTO: 36.6 % (ref 36–48)
HGB BLD-MCNC: 12.1 G/DL (ref 12–16)
LYMPHOCYTES # BLD: 2 K/UL (ref 1–5.1)
LYMPHOCYTES NFR BLD: 43.9 %
MAGNESIUM SERPL-MCNC: 1.8 MG/DL (ref 1.8–2.4)
MCH RBC QN AUTO: 29.6 PG (ref 26–34)
MCHC RBC AUTO-ENTMCNC: 33 G/DL (ref 31–36)
MCV RBC AUTO: 89.7 FL (ref 80–100)
MONOCYTES # BLD: 0.9 K/UL (ref 0–1.3)
MONOCYTES NFR BLD: 19.8 %
NEUTROPHILS # BLD: 1.5 K/UL (ref 1.7–7.7)
NEUTROPHILS NFR BLD: 32.1 %
PLATELET # BLD AUTO: 229 K/UL (ref 135–450)
PMV BLD AUTO: 7.3 FL (ref 5–10.5)
POTASSIUM SERPL-SCNC: 2.6 MMOL/L (ref 3.5–5.1)
POTASSIUM SERPL-SCNC: 3.6 MMOL/L (ref 3.5–5.1)
PROT SERPL-MCNC: 6.6 G/DL (ref 6.4–8.2)
RBC # BLD AUTO: 4.08 M/UL (ref 4–5.2)
SODIUM SERPL-SCNC: 140 MMOL/L (ref 136–145)
WBC # BLD AUTO: 4.5 K/UL (ref 4–11)

## 2023-11-07 PROCEDURE — 83735 ASSAY OF MAGNESIUM: CPT

## 2023-11-07 PROCEDURE — 99285 EMERGENCY DEPT VISIT HI MDM: CPT

## 2023-11-07 PROCEDURE — 2580000003 HC RX 258

## 2023-11-07 PROCEDURE — 1200000000 HC SEMI PRIVATE

## 2023-11-07 PROCEDURE — 85025 COMPLETE CBC W/AUTO DIFF WBC: CPT

## 2023-11-07 PROCEDURE — 87324 CLOSTRIDIUM AG IA: CPT

## 2023-11-07 PROCEDURE — 97162 PT EVAL MOD COMPLEX 30 MIN: CPT

## 2023-11-07 PROCEDURE — 80048 BASIC METABOLIC PNL TOTAL CA: CPT

## 2023-11-07 PROCEDURE — 97530 THERAPEUTIC ACTIVITIES: CPT

## 2023-11-07 PROCEDURE — 87449 NOS EACH ORGANISM AG IA: CPT

## 2023-11-07 PROCEDURE — 96365 THER/PROPH/DIAG IV INF INIT: CPT

## 2023-11-07 PROCEDURE — 84443 ASSAY THYROID STIM HORMONE: CPT

## 2023-11-07 PROCEDURE — 6370000000 HC RX 637 (ALT 250 FOR IP)

## 2023-11-07 PROCEDURE — 36415 COLL VENOUS BLD VENIPUNCTURE: CPT

## 2023-11-07 PROCEDURE — 6370000000 HC RX 637 (ALT 250 FOR IP): Performed by: STUDENT IN AN ORGANIZED HEALTH CARE EDUCATION/TRAINING PROGRAM

## 2023-11-07 PROCEDURE — 74177 CT ABD & PELVIS W/CONTRAST: CPT

## 2023-11-07 PROCEDURE — 87506 IADNA-DNA/RNA PROBE TQ 6-11: CPT

## 2023-11-07 PROCEDURE — 97116 GAIT TRAINING THERAPY: CPT

## 2023-11-07 PROCEDURE — 6360000004 HC RX CONTRAST MEDICATION: Performed by: NURSE PRACTITIONER

## 2023-11-07 PROCEDURE — 86140 C-REACTIVE PROTEIN: CPT

## 2023-11-07 PROCEDURE — 93005 ELECTROCARDIOGRAM TRACING: CPT | Performed by: STUDENT IN AN ORGANIZED HEALTH CARE EDUCATION/TRAINING PROGRAM

## 2023-11-07 PROCEDURE — 86364 TISS TRNSGLTMNASE EA IG CLAS: CPT

## 2023-11-07 PROCEDURE — 80076 HEPATIC FUNCTION PANEL: CPT

## 2023-11-07 PROCEDURE — 97165 OT EVAL LOW COMPLEX 30 MIN: CPT

## 2023-11-07 PROCEDURE — 96366 THER/PROPH/DIAG IV INF ADDON: CPT

## 2023-11-07 PROCEDURE — 6360000002 HC RX W HCPCS: Performed by: STUDENT IN AN ORGANIZED HEALTH CARE EDUCATION/TRAINING PROGRAM

## 2023-11-07 PROCEDURE — 97535 SELF CARE MNGMENT TRAINING: CPT

## 2023-11-07 PROCEDURE — 85652 RBC SED RATE AUTOMATED: CPT

## 2023-11-07 PROCEDURE — 83516 IMMUNOASSAY NONANTIBODY: CPT

## 2023-11-07 PROCEDURE — 84132 ASSAY OF SERUM POTASSIUM: CPT

## 2023-11-07 RX ORDER — MAGNESIUM SULFATE IN WATER 40 MG/ML
2000 INJECTION, SOLUTION INTRAVENOUS PRN
Status: DISCONTINUED | OUTPATIENT
Start: 2023-11-07 | End: 2023-11-08

## 2023-11-07 RX ORDER — LANOLIN ALCOHOL/MO/W.PET/CERES
3 CREAM (GRAM) TOPICAL NIGHTLY
Status: DISCONTINUED | OUTPATIENT
Start: 2023-11-07 | End: 2023-11-14 | Stop reason: HOSPADM

## 2023-11-07 RX ORDER — POTASSIUM CHLORIDE 7.45 MG/ML
10 INJECTION INTRAVENOUS PRN
Status: DISCONTINUED | OUTPATIENT
Start: 2023-11-07 | End: 2023-11-08

## 2023-11-07 RX ORDER — CITALOPRAM 20 MG/1
20 TABLET ORAL
Status: DISCONTINUED | OUTPATIENT
Start: 2023-11-07 | End: 2023-11-14 | Stop reason: HOSPADM

## 2023-11-07 RX ORDER — POTASSIUM CHLORIDE 7.45 MG/ML
10 INJECTION INTRAVENOUS
Status: COMPLETED | OUTPATIENT
Start: 2023-11-07 | End: 2023-11-07

## 2023-11-07 RX ORDER — POTASSIUM CHLORIDE 20 MEQ/1
40 TABLET, EXTENDED RELEASE ORAL PRN
Status: DISCONTINUED | OUTPATIENT
Start: 2023-11-07 | End: 2023-11-08

## 2023-11-07 RX ORDER — SODIUM CHLORIDE 9 MG/ML
INJECTION, SOLUTION INTRAVENOUS CONTINUOUS
Status: ACTIVE | OUTPATIENT
Start: 2023-11-07 | End: 2023-11-08

## 2023-11-07 RX ORDER — ACETAMINOPHEN 325 MG/1
650 TABLET ORAL EVERY 6 HOURS
Status: DISCONTINUED | OUTPATIENT
Start: 2023-11-07 | End: 2023-11-14 | Stop reason: HOSPADM

## 2023-11-07 RX ORDER — SODIUM CHLORIDE 0.9 % (FLUSH) 0.9 %
5-40 SYRINGE (ML) INJECTION EVERY 12 HOURS SCHEDULED
Status: DISCONTINUED | OUTPATIENT
Start: 2023-11-07 | End: 2023-11-14 | Stop reason: HOSPADM

## 2023-11-07 RX ORDER — ONDANSETRON 4 MG/1
4 TABLET, ORALLY DISINTEGRATING ORAL EVERY 8 HOURS PRN
Status: DISCONTINUED | OUTPATIENT
Start: 2023-11-07 | End: 2023-11-14 | Stop reason: HOSPADM

## 2023-11-07 RX ORDER — SODIUM CHLORIDE 9 MG/ML
INJECTION, SOLUTION INTRAVENOUS PRN
Status: DISCONTINUED | OUTPATIENT
Start: 2023-11-07 | End: 2023-11-14 | Stop reason: HOSPADM

## 2023-11-07 RX ORDER — ACETAMINOPHEN 325 MG/1
650 TABLET ORAL EVERY 6 HOURS PRN
Status: DISCONTINUED | OUTPATIENT
Start: 2023-11-07 | End: 2023-11-14 | Stop reason: HOSPADM

## 2023-11-07 RX ORDER — SODIUM CHLORIDE 0.9 % (FLUSH) 0.9 %
5-40 SYRINGE (ML) INJECTION PRN
Status: DISCONTINUED | OUTPATIENT
Start: 2023-11-07 | End: 2023-11-14 | Stop reason: HOSPADM

## 2023-11-07 RX ORDER — ACETAMINOPHEN 650 MG/1
650 SUPPOSITORY RECTAL EVERY 6 HOURS PRN
Status: DISCONTINUED | OUTPATIENT
Start: 2023-11-07 | End: 2023-11-14 | Stop reason: HOSPADM

## 2023-11-07 RX ORDER — POLYETHYLENE GLYCOL 3350 17 G/17G
17 POWDER, FOR SOLUTION ORAL DAILY PRN
Status: DISCONTINUED | OUTPATIENT
Start: 2023-11-07 | End: 2023-11-14 | Stop reason: HOSPADM

## 2023-11-07 RX ORDER — ONDANSETRON 2 MG/ML
4 INJECTION INTRAMUSCULAR; INTRAVENOUS EVERY 6 HOURS PRN
Status: DISCONTINUED | OUTPATIENT
Start: 2023-11-07 | End: 2023-11-14 | Stop reason: HOSPADM

## 2023-11-07 RX ORDER — CITALOPRAM 20 MG/1
20 TABLET ORAL
Status: DISCONTINUED | OUTPATIENT
Start: 2023-11-07 | End: 2023-11-07

## 2023-11-07 RX ADMIN — POTASSIUM CHLORIDE 10 MEQ: 10 INJECTION, SOLUTION INTRAVENOUS at 06:40

## 2023-11-07 RX ADMIN — SODIUM CHLORIDE, PRESERVATIVE FREE 10 ML: 5 INJECTION INTRAVENOUS at 20:41

## 2023-11-07 RX ADMIN — POTASSIUM BICARBONATE 40 MEQ: 782 TABLET, EFFERVESCENT ORAL at 04:31

## 2023-11-07 RX ADMIN — POTASSIUM CHLORIDE 10 MEQ: 10 INJECTION, SOLUTION INTRAVENOUS at 04:33

## 2023-11-07 RX ADMIN — POTASSIUM CHLORIDE 10 MEQ: 10 INJECTION, SOLUTION INTRAVENOUS at 08:55

## 2023-11-07 RX ADMIN — APIXABAN 5 MG: 5 TABLET, FILM COATED ORAL at 12:33

## 2023-11-07 RX ADMIN — CITALOPRAM HYDROBROMIDE 20 MG: 20 TABLET ORAL at 20:39

## 2023-11-07 RX ADMIN — IOPAMIDOL 75 ML: 755 INJECTION, SOLUTION INTRAVENOUS at 18:49

## 2023-11-07 RX ADMIN — DIATRIZOATE MEGLUMINE AND DIATRIZOATE SODIUM 30 ML: 660; 100 LIQUID ORAL; RECTAL at 17:28

## 2023-11-07 RX ADMIN — APIXABAN 5 MG: 5 TABLET, FILM COATED ORAL at 20:39

## 2023-11-07 RX ADMIN — Medication 3 MG: at 20:39

## 2023-11-07 RX ADMIN — SODIUM CHLORIDE: 9 INJECTION, SOLUTION INTRAVENOUS at 12:32

## 2023-11-07 ASSESSMENT — PAIN - FUNCTIONAL ASSESSMENT: PAIN_FUNCTIONAL_ASSESSMENT: NONE - DENIES PAIN

## 2023-11-07 NOTE — ACP (ADVANCE CARE PLANNING)
Advance Care Planning     General Advance Care Planning (ACP) Conversation    Date of Conversation: 11/7/2023  Conducted with: Patient with [address]:    Primary Decision Maker: Romina Wilson - [phone]    Secondary Decision Maker: Holley Gutiérrez Child - [phone]    Secondary Decision Maker: Nuzhat Jones - Child - [phone]  Click here to complete Healthcare Decision Makers including selection of the Healthcare Decision Maker Relationship (ie \"Primary\"). Today we documented Decision Maker(s) consistent with ACP documents on file. Content/Action Overview:   Has ACP document(s) on file - reflects the patient's care preferences    Length of Voluntary ACP Conversation in minutes:  <16 minutes (Non-Billable)    JEROME Rivera Student

## 2023-11-07 NOTE — CARE COORDINATION
Case Management Assessment  Initial Evaluation    Date/Time of Evaluation: 11/7/2023 10:37 AM  Assessment Completed by: Luther Villaseñor    If patient is discharged prior to next notation, then this note serves as note for discharge by case management. Patient Name: Kerry Dee                   YOB: 1935  Diagnosis: Hypokalemia [E87.6]                   Date / Time: 11/7/2023  3:19 AM    Patient Admission Status: Inpatient   Readmission Risk (Low < 19, Mod (19-27), High > 27): Readmission Risk Score: 13.4    Current PCP: DEBBIE Gutierrez CNP  PCP verified by CM? Yes    Chart Reviewed: Yes      History Provided by: Patient  Patient Orientation: Alert and Oriented    Patient Cognition: Alert    Hospitalization in the last 30 days (Readmission):  Yes    If yes, Readmission Assessment in CM Navigator will be completed. Advance Directives:      Code Status: Prior   Patient's Primary Decision Maker is:      Primary Decision Maker: Romina Paulino [facility] - [phone]    Secondary Decision Maker: Otis Miller Child - [phone]    Secondary Decision Maker: Dario David Child - [phone]    Discharge Planning:    Patient lives with: Other (Comment) Type of Home: Skilled Nursing Facility  Primary Care Giver: Self  Patient Support Systems include: Children, Family Members   Current Financial resources: None  Current community resources: ECF/Home Care  Current services prior to admission: [address]            Current DME:              Type of Home Care services:       ADLS  Prior functional level: Assistance with the following:, Bathing, Dressing, Toileting, Cooking, Housework, Shopping, Mobility  Current functional level: Assistance with the following:, Bathing, Dressing, Toileting, Cooking, Housework, Shopping, Mobility    PT AM-PAC:   /24  OT AM-PAC:   /24    Family can provide assistance at DC:  Other (comment) (From the onel)  Would you like Case Management family were provided with a choice of provider and agrees with the discharge plan. Freedom of choice list with basic dialogue that supports the patient's individualized plan of care/goals and shares the quality data associated with the providers was provided to:     Patient Representative Name:       The Patient and/or Patient Representative Agree with the Discharge Plan?       Emani Stewart [facility]  Case Management Department  Ph: [phone] Fax: [phone]   Electronically signed by AYLIN Iverson on 11/7/2023 at 10:54 AM,

## 2023-11-07 NOTE — PROGRESS NOTES
Physical Therapy  Facility/Department: [facility] [address] [place] [address]  ED  Physical Therapy Initial Assessment/Treatment     Name: Humphrey Drummond  : 1935  MRN: [id number]  Date of Service: 2023    Discharge Recommendations:  Subacute/Skilled Nursing Facility   PT Equipment Recommendations  Equipment Needed: No  Other: Defer      Patient Diagnosis(es): The primary encounter diagnosis was Diarrhea, unspecified type. Diagnoses of Failure to thrive in adult and Goals of care, counseling/discussion were also pertinent to this visit. Past Medical History:  has a past medical history of Acute deep vein thrombosis (DVT) of calf muscle vein of left lower extremity ([address]), Arthritis, Cataract, H/O colonoscopy, Hyperlipidemia, and Osteoporosis. Past Surgical History:  has a past surgical history that includes Cataract removal; Colonoscopy; and hip surgery (Left, 2023). Assessment   Body Structures, Functions, Activity Limitations Requiring Skilled Therapeutic Intervention: Decreased functional mobility ; Decreased endurance;Decreased posture;Decreased balance;Decreased strength;Decreased ROM  Assessment: Pt to [facility] with diagnosis of hypokalemia. [place] pt is from [place], but was admitted from SNF. Pt was independent with tansfers and ambulation with no a RW. Pt currently requires min A for bed mobility, CGA for transfers, and CGA for ambulation with RW. Pt requires min A for rolling in bed and total A for sherrie-care and brief change in bed. Pt will continue to benefit from skilled PT services to address current deficits. It is rec pt return to SNF when deemed medically appropriate. Co-tx collaboration this date to safely meet goals and will have better occupational performance outcomes with in a co-treatment than 1:1 session.     Therapy Prognosis: Good  Decision Making: Medium Complexity  Barriers to Learning: None  Activity Tolerance  Activity Tolerance: Patient tolerated evaluation without incident;Patient tolerated assistance  Distance (ft): 75 Feet  Assistive Device: Walker, rolling;Gait belt  Interventions: Safety awareness training;Verbal cues; Tactile cues;Manual cues  Base of Support: Narrowed  Speed/Sarah: Pace decreased (< 100 feet/min); Shuffled; Slow  Step Length: Right shortened;Left shortened  Stance: Left increased;Right increased  Gait Abnormalities: Decreased step clearance;Shuffling gait; Path deviations       OutComes Score  AM-PAC Score  AM-PAC Inpatient Mobility Raw Score : 17 (11/07/23 1117)  AM-PAC Inpatient T-Scale Score : 42.13 (11/07/23 1117)  Mobility Inpatient CMS 0-100% Score: 50.57 (11/07/23 1117)  Mobility Inpatient CMS G-Code Modifier : CK (11/07/23 1117)     Goals  Short Term Goals  Time Frame for Short Term Goals: 11/14/23  Short Term Goal 1: pt will complete bed mobility with SBA  Short Term Goal 2: pt will complete transfers with SBA  Short Term Goal 3: pt will ambulate 100ft with LRAD and supervision  Short Term Goal 4: pt will participate in 10-12 reps of BLE exercises by 11/10/23  Patient Goals   Patient Goals : \"To go home\"       Education  Patient Education  Education Given To: Patient  Education Provided: Role of Therapy;Plan of Care;Equipment;Transfer Training  Education Method: Verbal  Barriers to Learning: None  Education Outcome: Verbalized understanding;Demonstrated understanding      Therapy Time   Individual Concurrent Group Co-treatment   Time In 0918         Time Out 0952         Minutes 34         Timed Code Treatment Minutes: 24 Minutes (10 min eval)       Karan Dejesus PT, DPT    If pt is unable to be seen after this session, please let this note serve as discharge summary. Please see case management note for discharge disposition. Thank you.

## 2023-11-07 NOTE — ED NOTES
Pt to ED sent from residence for low Potassium levels and diarrhea. Pt states has chronic back pain for known T-spine Fx, has brace that she wears when she ambulates with walker, denies any other needs or c/o at this time, on BP cuff Pulse ox and Cardiac monitor and call light placed within reach, bed locked lowest position, rails up x2.      Maricel Joseph RN  11/07/23 0766

## 2023-11-07 NOTE — H&P
V2.0  History and Physical      Name:  Ravin Nunez /Age/Sex: 1935  (80 y.o. female)   MRN & CSN:  [id number] & [id number] Encounter Date/Time: 2023 9:47 AM EST   Location:   PCP: Derik Power [address] [place] [address]  Day: 1    Assessment and Plan:   Ravin Nunez is a 80 y.o. female with a pmh of depression, history of acute DVT on Eliquis 5 mg twice daily, history compression fracture of L1, tremors on propanolol who presents with Hypokalemia    Hospital Problems             Last Modified POA    * (Principal) Hypokalemia 2023 Yes       Plan:  Hypokalemia  Likely secondary to diarrhea  Patient arrived with a potassium of 2.7  EKG was sinus rhythm  10 mg IV and 40 mg oral potassium replacement was given in the ED  Recheck potassium today  Repeat potassium as necessary  Telemetry  PT OT    Diarrhea  Possible bacterial enterocolitis, GI PCR pathogen negative on 23  Patient's been having greater than 4 watery diarrhea for the past 1-1/2 months  65 mL per hour of IV fluids infusion will be given for 1L  GI pathogen PCR ordered  GI consult     History of acute DVT left lower extremity  10/6/2023 lower extremity DVT study showed acute DVT  Patient on Eliquis 5 mg twice daily    Depression  Continue Celexa 10 mg twice daily at night    Tremors  Patient takes propanolol which is currently held due to low blood pressure    History of paroxysmal A-fib  Patient was not want to see a cardiologist at that time  Patient on Eliquis 5 mg twice daily for DVT    Disposition:   Current Living situation: [facility]  Expected Disposition: [facility]  Estimated D/C: 2 to 3 days    Diet No diet orders on file   DVT Prophylaxis [] Lovenox, []  Heparin, [] SCDs, [] Ambulation,  [x] Eliquis, [] Xarelto, [] Coumadin   Code Status Prior   Surrogate Decision Maker/ MANJU Cabrera     Personally reviewed Lab Studies and Imaging             History from:     patient    History of Present Illness:     Chief Complaint: Hypokalemia  Aldo Maynard is a 80 y.o. female with pmh of pmh of depression, history of acute DVT on Eliquis 5 mg twice daily, tremors on propanolol  who presents with hypokalemia    Patient presented with abnormal potassium of 2.7 in the ED. Patient also had watery diarrhea with a minimum of 4 watery bowel movements per day. Patient denies any blood or mucus in the stool. Patient says this began around September 2023. There is some associated lower abdominal pain with bowel movement. Patient has been placed on Imodium and cephalexin 500 mg 3 times daily for 3 days with no resolution of her symptoms. Not been in contact with any sick individuals. Patient has no associated fevers or chills or vomiting. Patient feels that she is overall feels weak. GI pathogen PCR negative on 9/21/23. In ED, patient received 10 mg of IV potassium and 40 mg oral potassium. EKG was sinus rhythm. Review of Systems:        Pertinent positives and negatives discussed in HPI     Objective:   No intake or output data in the 24 hours ending 11/07/23 0947   Vitals:   Vitals:    11/07/23 0523 11/07/23 0624 11/07/23 0824 11/07/23 0925   BP: 101/65 95/84 108/71 (!) 108/93   Pulse: 63 73 79 74   Resp: 12 27 26    Temp:       TempSrc:       SpO2: 95% 95% 96% 96%   Weight:       Height:           Medications Prior to Admission     Prior to Admission medications    Medication Sig Start Date End Date Taking?  Authorizing Provider   apixaban (ELIQUIS) 5 MG TABS tablet Take 1 tablet by mouth 2 times daily 10/12/23   Ernie Cabrera APRN - CNP   Probiotic Product (PROBIOTIC-10 ULTIMATE PO) Take by mouth    ProviderSushil MD   melatonin 3 MG TABS tablet Take 1 tablet by mouth daily    Sushil Villeda MD   propranolol (INDERAL) 10 MG tablet TAKE 1 TABLET BY MOUTH TWICE A DAY 8/28/23   Ernie Cabrera APRN - CNP   acetaminophen (TYLENOL) 325 MG tablet Take 2 tablets by mouth in the morning and 2 tablets at noon and 2 tablets in

## 2023-11-07 NOTE — ED PROVIDER NOTES
[address] [place] [address]  ED  EMERGENCY DEPARTMENT ENCOUNTER        Pt Name: Maria D Laura  MRN: [id number]  [address] 1935  Date of evaluation: 11/7/2023  Provider: Jesus Alberto Logan MD  PCP: DEBBIE Monet - CNP  Note Started: 5:18 AM EST 11/7/23    CHIEF COMPLAINT       Chief Complaint   Patient presents with    Abnormal Lab     Diarrhea ongoing for two months. Had labs done showing low potassium. Denies any pain or blood in stool. Lumbar back fractures    Diarrhea       HISTORY OF PRESENT ILLNESS: 1 or more Elements     History from : Patient    Limitations to history : None    Maria D Laura is a 80 y.o. female who presents with diarrhea for the past 2 months, described as watery, at least 4 watery bowel movements per day. Denies blood in the stool. Has been more difficult to take care of herself, has required escalated nursing care at her independent living facility, she was sent in for evaluation of hypokalemia, labs are reviewed, K was 3.3 three days ago according to labs available, EMS reported pt had another blood draw more recently with lower results. No HA, abd pain, nausea or vomiting. No fevers. Patient endorses possible recent antibiotics for the diarrhea. Nursing Notes were all reviewed and agreed with or any disagreements were addressed in the HPI. REVIEW OF SYSTEMS :      Positives and Pertinent negatives as per HPI. ROS otherwise unremarkable. SURGICAL HISTORY     Past Surgical History:   Procedure Laterality Date    CATARACT REMOVAL      COLONOSCOPY      HIP SURGERY Left 5/14/2023    LEFT HIP OPEN REDUCTION INTERNAL FIXATION INTRAMEDULLARY NAILING performed by Louis Singh DO at [address] [place] [address]       Previous Medications    ACETAMINOPHEN (TYLENOL) 325 MG TABLET    Take 2 tablets by mouth in the morning and 2 tablets at noon and 2 tablets in the evening and 2 tablets before bedtime.     APIXABAN (ELIQUIS) 5 MG TABS TABLET    Take 1 tablet by mouth 2

## 2023-11-07 NOTE — CONSULTS
Manan Rowland is a 80 y.o. female asked to see us in consultation by  Marisol Bustamante MD for evaluation of diarrhea. Ms. Phani San is an 80year old female with past medical history of acute DVT, depression, PBC on ursodiol and recent admission 10/13 for back pain in which she was found to have a closed lumbar compression fracture. She presents to the ER now with diarrhea. She says it has been ongoing since September, about 4 episodes daily. It sometimes wakes her up in the middle of the night. No associated rectal bleeding. It occurs mostly after eating. She reports weight loss. No fever or abdominal pain. She has been using imodium. C. Diff and stool culture in September were unremarkable. No recent colonoscopy. Work-up shows hypokalemia at 2.6. No leukocytosis. Repeat C. Diff testing negative. She follows with Dr. Jarrett Sheridan for [facility] without evidence of fibrosis according to elastography from 2021. She was last seen in office in January 2022. Not in a hospital admission. Medication:   acetaminophen  650 mg Oral Q6H    apixaban  5 mg Oral BID    melatonin  3 mg Oral Nightly    sodium chloride flush  5-40 mL IntraVENous 2 times per day    citalopram  20 mg Oral QHS      sodium chloride      sodium chloride 65 mL/hr at 11/07/23 1232       Allergies:   Allergies   Allergen Reactions    Morphine Hallucinations    Tramadol      Hypersomnia       Immunizations:  Immunization History   Administered Date(s) Administered    COVID-19, PFIZER GRAY top, DO NOT Dilute, (age 15 y+), IM, 30 mcg/0.3 mL 06/03/2022    COVID-19, PFIZER PURPLE top, DILUTE for use, (age 15 y+), 30mcg/0.3mL 01/15/2021, 02/05/2021, 10/03/2021    DTaP 02/23/2010    Influenza 10/07/2006    Influenza Virus Vaccine 10/16/2003, 10/08/2005, 10/29/2007, 10/09/2008, 09/23/2009, 09/15/2010, 09/17/2011 Influenza, FLUAD, (age 72 y+), Adjuvanted, 0.5mL 09/24/2020, 09/26/2022, 10/12/2023    Influenza, Ruiz Pages (age 72 y+), High Dose, 0.7mL 10/03/2021    Influenza, High Dose (Fluzone 65 yrs and older) 10/10/2014, 09/09/2015, 10/14/2016, 10/06/2017, 10/05/2018    Influenza, Triv, inactivated, subunit, adjuvanted, IM (Fluad 65 yrs and older) 10/18/2019    Pneumococcal Conjugate 7-valent (Calli Bustamante) 03/10/2006    Pneumococcal, PCV-13, PREVNAR 13, (age 6w+), IM, 0.5mL 03/09/2015    Pneumococcal, PPSV23, PNEUMOVAX 23, (age 2y+), SC/IM, 0.5mL 03/27/2017    Zoster Live (Zostavax) 04/24/2008    Zoster Recombinant (Shingrix) 03/22/2019, 05/29/2019       Family history, past medical history, and  social  history  are  reviewed as  below.   Past Medical  History:  Past Medical History:   Diagnosis Date    Acute deep vein thrombosis (DVT) of calf muscle vein of left lower extremity ([address]) 10/12/2023    Arthritis     Cataract     H/O colonoscopy     Hyperlipidemia     Osteoporosis        Past  Surgical History:  Past Surgical History:   Procedure Laterality Date    CATARACT REMOVAL      COLONOSCOPY      HIP SURGERY Left 5/14/2023    LEFT HIP OPEN REDUCTION INTERNAL FIXATION INTRAMEDULLARY NAILING performed by Rafi Joaquin DO at [facility] OR       Family  History:  Family History   Problem Relation Age of Onset    Heart Defect Mother     Prostate Cancer Father        Social History:  Social History     Tobacco Use    Smoking status: Never    Smokeless tobacco: Never   Vaping Use    Vaping Use: Never used   Substance Use Topics    Alcohol use: Not Currently     Comment: occasional    Drug use: No       ROS  Constitutional:  Denies fever,sweats, chills + weight loss, + weakness  Eyes:  Denies change in visual acuity   HENT:  Denies hearing loss or dizziness  Respiratory:  Denies cough or shortness of breath   Cardiovascular:  Denies edema or chest pain  :  Denies dysuria, hematuria, urgency or frequency  Musculoskeletal:  + back pain or

## 2023-11-07 NOTE — PROGRESS NOTES
Occupational Therapy  Facility/Department: [address] [place] [address]  Occupational Therapy Initial Assessment    Name: Mulu Robison  : 1935  MRN: [id number]  Date of Service: 2023    Discharge Recommendations:  Subacute/Skilled Nursing Facility  OT Equipment Recommendations  Equipment Needed: No       Patient Diagnosis(es): The primary encounter diagnosis was Diarrhea, unspecified type. Diagnoses of Failure to thrive in adult and Goals of care, counseling/discussion were also pertinent to this visit. Past Medical History:  has a past medical history of Acute deep vein thrombosis (DVT) of calf muscle vein of left lower extremity ([address]), Arthritis, Cataract, H/O colonoscopy, Hyperlipidemia, and Osteoporosis. Past Surgical History:  has a past surgical history that includes Cataract removal; Colonoscopy; and hip surgery (Left, 2023). Therapy discharge recommendations take into account each patient's current medical complexities and are subject to input/oversight from the patient's healthcare team.     Barriers to Home Discharge:   [x] Steps to access home entry or bed/bath:   [] Unable to transfer, ambulate, or propel wheelchair household distances without assist   [x] Limited available assist at home upon discharge    [] Patient or family requests d/c to post-acute facility    [] Poor cognition/safety awareness for d/c to home alone    [] Unable to maintain ordered weight bearing status    [] Patient with salient signs of long-standing immobility   [x] Decreased independence with ADLs   [x] Increased risk for falls   [] Other:    If pt is unable to be seen after this session, please let this note serve as discharge summary. Please see case management note for discharge disposition. Thank you. Assessment   Performance deficits / Impairments: Decreased safe awareness;Decreased functional mobility ; Decreased balance;Decreased coordination;Decreased ADL status; Decreased high-level

## 2023-11-07 NOTE — PROGRESS NOTES
4 Eyes Skin Assessment     The patient is being assess for  Admission    I agree that 2 RN's have performed a thorough Head to Toe Skin Assessment on the patient. ALL assessment sites listed below have been assessed. Areas assessed by both nurses: Manpreet Lou RN   [x]   Head, Face, and Ears   [x]   Shoulders, Back, and Chest  [x]   Arms, Elbows, and Hands   [x]   Coccyx, Sacrum, and Ischum  [x]   Legs, Feet, and Heels        Does the Patient have Skin Breakdown?   Yes a wound was noted on the Admission Assessment and an WOUND LDA was Initiated documentation include the Kathrine-wound, Wound Assessment, Measurements, Dressing Treatment, Drainage, and Color\", - Small shearing wound to buttock, blanchable redness to buttocks        Jorge Prevention initiated:  Yes   Wound Care Orders initiated:  NA      [facility] nurse consulted for Pressure Injury (Stage 3,4, Unstageable, DTI, NWPT, and Complex wounds):  NA      Nurse 1 eSignature: Electronically signed by Hafsa Garcia RN on 11/7/23 at 6:22 PM EST    **SHARE this note so that the co-signing nurse is able to place an eSignature**    Nurse 2 eSignature: Electronically signed by Damir Burciaga RN on 11/7/23 at 6:46 PM EST

## 2023-11-08 LAB
ANION GAP SERPL CALCULATED.3IONS-SCNC: 10 MMOL/L (ref 3–16)
BASOPHILS # BLD: 0 K/UL (ref 0–0.2)
BASOPHILS NFR BLD: 0.7 %
BUN SERPL-MCNC: 6 MG/DL (ref 7–20)
CALCIUM SERPL-MCNC: 8.4 MG/DL (ref 8.3–10.6)
CHLORIDE SERPL-SCNC: 103 MMOL/L (ref 99–110)
CO2 SERPL-SCNC: 29 MMOL/L (ref 21–32)
CREAT SERPL-MCNC: <0.5 MG/DL (ref 0.6–1.2)
DEPRECATED RDW RBC AUTO: 17.3 % (ref 12.4–15.4)
EKG ATRIAL RATE: 66 BPM
EKG DIAGNOSIS: NORMAL
EKG P AXIS: 66 DEGREES
EKG P-R INTERVAL: 200 MS
EKG Q-T INTERVAL: 318 MS
EKG QRS DURATION: 104 MS
EKG QTC CALCULATION (BAZETT): 333 MS
EKG R AXIS: -29 DEGREES
EKG T AXIS: 39 DEGREES
EKG VENTRICULAR RATE: 66 BPM
EOSINOPHIL # BLD: 0.1 K/UL (ref 0–0.6)
EOSINOPHIL NFR BLD: 3.2 %
GFR SERPLBLD CREATININE-BSD FMLA CKD-EPI: >60 ML/MIN/{1.73_M2}
GI PATHOGENS PNL STL NAA+PROBE: NORMAL
GLUCOSE SERPL-MCNC: 92 MG/DL (ref 70–99)
HCT VFR BLD AUTO: 34.5 % (ref 36–48)
HGB BLD-MCNC: 11.2 G/DL (ref 12–16)
LACTOFERRIN STL QL IA: ABNORMAL
LYMPHOCYTES # BLD: 1.3 K/UL (ref 1–5.1)
LYMPHOCYTES NFR BLD: 36.8 %
MAGNESIUM SERPL-MCNC: 1.7 MG/DL (ref 1.8–2.4)
MAGNESIUM SERPL-MCNC: 2.4 MG/DL (ref 1.8–2.4)
MCH RBC QN AUTO: 29.7 PG (ref 26–34)
MCHC RBC AUTO-ENTMCNC: 32.4 G/DL (ref 31–36)
MCV RBC AUTO: 91.5 FL (ref 80–100)
MONOCYTES # BLD: 0.8 K/UL (ref 0–1.3)
MONOCYTES NFR BLD: 22.3 %
NEUTROPHILS # BLD: 1.3 K/UL (ref 1.7–7.7)
NEUTROPHILS NFR BLD: 37 %
PLATELET # BLD AUTO: 209 K/UL (ref 135–450)
PMV BLD AUTO: 7.5 FL (ref 5–10.5)
POTASSIUM SERPL-SCNC: 3 MMOL/L (ref 3.5–5.1)
POTASSIUM SERPL-SCNC: 3.6 MMOL/L (ref 3.5–5.1)
POTASSIUM SERPL-SCNC: 3.9 MMOL/L (ref 3.5–5.1)
RBC # BLD AUTO: 3.77 M/UL (ref 4–5.2)
SODIUM SERPL-SCNC: 142 MMOL/L (ref 136–145)
TSH SERPL DL<=0.005 MIU/L-ACNC: 3.62 UIU/ML (ref 0.27–4.2)
WBC # BLD AUTO: 3.6 K/UL (ref 4–11)

## 2023-11-08 PROCEDURE — 93010 ELECTROCARDIOGRAM REPORT: CPT | Performed by: INTERNAL MEDICINE

## 2023-11-08 PROCEDURE — 84132 ASSAY OF SERUM POTASSIUM: CPT

## 2023-11-08 PROCEDURE — 83630 LACTOFERRIN FECAL (QUAL): CPT

## 2023-11-08 PROCEDURE — 83735 ASSAY OF MAGNESIUM: CPT

## 2023-11-08 PROCEDURE — 6360000002 HC RX W HCPCS: Performed by: STUDENT IN AN ORGANIZED HEALTH CARE EDUCATION/TRAINING PROGRAM

## 2023-11-08 PROCEDURE — 6370000000 HC RX 637 (ALT 250 FOR IP)

## 2023-11-08 PROCEDURE — 1200000000 HC SEMI PRIVATE

## 2023-11-08 PROCEDURE — 85025 COMPLETE CBC W/AUTO DIFF WBC: CPT

## 2023-11-08 PROCEDURE — 2580000003 HC RX 258

## 2023-11-08 PROCEDURE — 6360000002 HC RX W HCPCS

## 2023-11-08 PROCEDURE — 36415 COLL VENOUS BLD VENIPUNCTURE: CPT

## 2023-11-08 PROCEDURE — 80048 BASIC METABOLIC PNL TOTAL CA: CPT

## 2023-11-08 RX ORDER — POTASSIUM CHLORIDE 20 MEQ/1
40 TABLET, EXTENDED RELEASE ORAL ONCE
Status: COMPLETED | OUTPATIENT
Start: 2023-11-08 | End: 2023-11-08

## 2023-11-08 RX ORDER — MAGNESIUM SULFATE 1 G/100ML
1000 INJECTION INTRAVENOUS ONCE
Status: COMPLETED | OUTPATIENT
Start: 2023-11-08 | End: 2023-11-08

## 2023-11-08 RX ADMIN — MAGNESIUM SULFATE HEPTAHYDRATE 1000 MG: 1 INJECTION, SOLUTION INTRAVENOUS at 08:32

## 2023-11-08 RX ADMIN — SODIUM CHLORIDE, PRESERVATIVE FREE 10 ML: 5 INJECTION INTRAVENOUS at 20:22

## 2023-11-08 RX ADMIN — POTASSIUM CHLORIDE 40 MEQ: 1500 TABLET, EXTENDED RELEASE ORAL at 08:24

## 2023-11-08 RX ADMIN — APIXABAN 5 MG: 5 TABLET, FILM COATED ORAL at 08:24

## 2023-11-08 RX ADMIN — POTASSIUM CHLORIDE 40 MEQ: 1500 TABLET, EXTENDED RELEASE ORAL at 12:49

## 2023-11-08 RX ADMIN — MAGNESIUM SULFATE HEPTAHYDRATE 1000 MG: 1 INJECTION, SOLUTION INTRAVENOUS at 12:55

## 2023-11-08 RX ADMIN — CITALOPRAM HYDROBROMIDE 20 MG: 20 TABLET ORAL at 20:22

## 2023-11-08 RX ADMIN — Medication 3 MG: at 20:22

## 2023-11-08 RX ADMIN — ACETAMINOPHEN 650 MG: 325 TABLET ORAL at 08:24

## 2023-11-08 RX ADMIN — SODIUM CHLORIDE, PRESERVATIVE FREE 10 ML: 5 INJECTION INTRAVENOUS at 08:24

## 2023-11-08 RX ADMIN — APIXABAN 5 MG: 5 TABLET, FILM COATED ORAL at 20:22

## 2023-11-08 NOTE — ACP (ADVANCE CARE PLANNING)
CODE STATUS CHANGE DOCUMENTATION             Date of Conversation: 11/7/2023  Conducted with: Patient with [address]: Named in Advance Directive or Healthcare Power of  (name) patient and [facility] and [place] # ! [facility] Decision Maker:    Primary Decision Maker: Abigail Goncalves - Healthcare Decision Maker - [phone]    Secondary Decision Maker: Ursula Meza Child - [phone]    Secondary Decision Maker: Jen Randolph - Child - [phone]  Click here to complete Healthcare Decision Makers including selection of the Healthcare Decision Maker Relationship (ie \"Primary\"). Today we documented Decision Maker(s) consistent with ACP documents on file. Content/Action Overview: Has ACP document(s) on file - reflects the patient's care preferences  Reviewed DNR/DNI and patient elects DNR order - completed portable DNR form & placed order    Unprovoked, patient states several times to me she is not to be resuscitated in any way. She says she had a very good life and is  not afraid to die. Wants to be allowed to have a natural death. Does not want intubation even for the short-term. Although patient is A & O x4, I was unsure of her level of insight into her medical issues. Called and spoke with POA #1, Reji Puckett. He confirms patient is a DNR/DNI. States these are her known wishes. They want to treat everything they can, but doubts they would be very aggressive (such as surgery). Wants to take things one step at a time. Code status further amended to Limited x 4s no better reflect wishes for DNR/DNI, but no comfort care.     See Palliative Care Note This Date    Smita Johnson, APRN - CNP  Palliative Care

## 2023-11-08 NOTE — PROGRESS NOTES
Pt A/Ox4, VSS. Shift assessment completed and documented. Pt denies pain. Up to [facility] with assist, +diarrhea. Pt resting in bed with eyes closed, no needs at this time.

## 2023-11-08 NOTE — PROGRESS NOTES
Shift assessment completed & charted. VSS. Pt still w/ episodes of diarrhea throughout shift. Pt is tolerating diet & oral fluids well, reports symptoms of diarrhea have improved since yesterday, denies abdominal pain. Replacement given for decreased potassium & magnesium, redraw levels are WNL. Awaiting results of stool studies, pt aware. Denies any further needs at this time, bed locked & in lowest position. Call light & bedside table within reach.

## 2023-11-08 NOTE — PLAN OF CARE
Problem: ABCDS Injury Assessment  Goal: Absence of physical injury  Outcome: Progressing  Flowsheets (Taken 11/8/2023 1811)  Absence of Physical Injury: Implement safety measures based on patient assessment

## 2023-11-08 NOTE — PROGRESS NOTES
V2.0    [facility] [place]      Name:  John Grace /Age/Sex: 1935  (80 y.o. female)   MRN & CSN:  [id number] & [id number] Encounter Date/Time: 2023 3:07 PM EST   Location:  [id number]- PCP: DEBBIE Jenkins CNP     Attending:Tanika Nuñez MD       Hospital Day: 2    Assessment and Recommendations   John Grace is a 80 y.o. female with pmh of depression, history of acute DVT on Eliquis 5 mg twice daily, history compression fracture of L1, tremors on propanolol  who presents with Hypokalemia      Plan:     Hypokalemia  Likely secondary to diarrhea   Admission potassium 2.7  Potassium this morning 23 was 3.0  80mg oral potassium given  Recheck potassium in the afternoon  Continue to monitor     Diarrhea  Unknown etiology  Improved stool formation this morning  Patient tolerating P.O. intake   CT-IV contrast showed no acute abnormalities   G. I. following possible colonoscopy for biopsy for possible microscopic colitis  (+)fecal leukocytes   (-)GI pathogen PCR  Fecal rafael, fat and elastase in process. Hx of acute DVT left lower extremity  10/6/2023 lower extremity DVT study showed acute DVT  Patient on Eliquis 5 mg twice daily    Depression  Continue Celexa 10 mg twice daily at night    Tremors  Patient takes propanolol which is currently held due to low blood pressure      Diet ADULT DIET;  Regular   DVT Prophylaxis [] Lovenox, []  Heparin, [] SCDs, [] Ambulation,  [x] Eliquis, [] Xarelto  [] Coumadin   Code Status Limited   Disposition From: SNF  Expected Disposition: SNF  Estimated Date of Discharge: 2-3days   Patient requires continued admission due to Medical Management   Surrogate Decision Maker/ MANJU Mckinley     Personally reviewed Lab Studies and Imaging       Subjective:     Chief Complaint: Hypokalemia    John Grace is a 80 y.o. female with pmh of pmh of depression, history of acute DVT on Eliquis 5 mg twice daily, tremors on propanolol  who presents with hypokalemia

## 2023-11-08 NOTE — CONSULTS
PALLIATIVE MEDICINE CONSULTATION     Patient name:Marina Victor   ZVR:[id number]    :1935  Room/Bed:0337/0337-01   LOS: 1 day         Date of consult:2023    Inpatient consult to Palliative Care  Consult performed by: DEBBIE Castellano - CNP  Consult ordered by: Sangeeta Lundberg DO              ASSESSMENT/RECOMMENDATIONS     80 y.o. female with recent compression fracture s/p recent discharge from ARU now admitted with ongoing diarrhea and hypokalemia       Symptom Management:  Goals of Care- Continue with disease-modifying care to maximize function. Patient/family agreeable to scopes for evaluation of diarrhea. Feel functional status will improve greatly if diarrhea can be resolved. Will return to LTC on DC. Code status amended to limited x 4 nos. Diarrhea - ongoing x 4 months.   >3 Bms per day. CT A/P sows diarrheal state. GI following    Hypokalemia - due to diarrhea. Management per hospitalist team.  Potassium on admission 2.7, improved to 3.6 today. Patient/Family Goals of Care :    23    Spoke with patient at the bedside. Patient is alert and oriented and appears to be decisional.  Patient agreeable to discussion with me today. She is from [facility]. Spouse  2 years ago. Moved to assisted living  5 months ago because \"I need help with everything\". Unable to tell me examples of needing help or why she thinks she needs help. Complains of weakness and weight loss from diarrhea. Still has appetite. Everything goes right through her. Thinks she has lost about 15 lbs. Goals are to get the diarrhea under control so she can improve her energy and strength. Would do more rehab if recommended. Would do fairly invasive testing if recommended (scopes, biopsies). Does not mind being in the hospital at all, thinks she gets better care here than at [facility]. Likes the [facility], and expects she will live there until she dies.   She has a sister there in the locked memory care unit. Not sure how much times he has left to live. Does not think she will ever be able to make another trip to [facility] due to her weakness, but would like it if she could. Discussed code status. She is adamant she is not to be resuscitated in any way. Wants a natural death. Verified grand son Narcisa Andujar is POA #1 and son Ania Murray is POA #2. Called and spoke with Narcisa Andujar. Verified patient is a DNR/DNI. They hope to treat whatever can be treated medically with hopes to maximize function. Doubts they would ever go so far as a surgery, but wants to take things one day/one test at a time right now. Would agree to colonoscopy if needed if it could potentially help to resolve the diarrhea. Total ACP discussion time: 55 minutes    Disposition/Discharge Plan:   - pending medical course       Advance Directives: The patient has appointed the following active healthcare agents:    Primary Decision Maker: [facility] [phone]    Secondary Decision Maker: Megan Danielle Child - [phone]    Secondary Decision Maker: Norman Duncan Child - [phone]    The Patient has the following current code status:    Code Status: Limited      Interactive exchange regarding medications,tests and procedures with: patient, floor RN, case management, hospitalist    Thank you for allowing us to participate in the care of this patient. HISTORY     CC: diarrhea   HPI: The patient is a 80 y.o. female with pmh of depression, history of acute DVT on Eliquis 5 mg twice daily, history compression fracture of L1, tremors on propanolol who presents with Hypokalemia    Patient presented with abnormal potassium of 2.7 in the ED. Patient also had watery diarrhea with a minimum of 4 watery bowel movements per day. Patient denies any blood or mucus in the stool. Patient says this began around September 2023. There is some associated lower abdominal pain with bowel movement.

## 2023-11-08 NOTE — CARE COORDINATION
Chart reviewed. Care managed by GI and IM. From [place] at [facility] originally, was skilled with plan to return when medically ready. Spoke with [facility], no barriers to return. Following for clinical improvement.  Nhung Lemos RN

## 2023-11-09 LAB
ANION GAP SERPL CALCULATED.3IONS-SCNC: 7 MMOL/L (ref 3–16)
BUN SERPL-MCNC: 5 MG/DL (ref 7–20)
CALCIUM SERPL-MCNC: 8.2 MG/DL (ref 8.3–10.6)
CHLORIDE SERPL-SCNC: 104 MMOL/L (ref 99–110)
CO2 SERPL-SCNC: 30 MMOL/L (ref 21–32)
CREAT SERPL-MCNC: <0.5 MG/DL (ref 0.6–1.2)
DEPRECATED RDW RBC AUTO: 17.3 % (ref 12.4–15.4)
GFR SERPLBLD CREATININE-BSD FMLA CKD-EPI: >60 ML/MIN/{1.73_M2}
GLUCOSE SERPL-MCNC: 90 MG/DL (ref 70–99)
HCT VFR BLD AUTO: 35.1 % (ref 36–48)
HGB BLD-MCNC: 11.6 G/DL (ref 12–16)
MAGNESIUM SERPL-MCNC: 2.1 MG/DL (ref 1.8–2.4)
MCH RBC QN AUTO: 29.8 PG (ref 26–34)
MCHC RBC AUTO-ENTMCNC: 33 G/DL (ref 31–36)
MCV RBC AUTO: 90.3 FL (ref 80–100)
PLATELET # BLD AUTO: 205 K/UL (ref 135–450)
PMV BLD AUTO: 7.1 FL (ref 5–10.5)
POTASSIUM SERPL-SCNC: 3.4 MMOL/L (ref 3.5–5.1)
RBC # BLD AUTO: 3.89 M/UL (ref 4–5.2)
SODIUM SERPL-SCNC: 141 MMOL/L (ref 136–145)
WBC # BLD AUTO: 3.3 K/UL (ref 4–11)

## 2023-11-09 PROCEDURE — 2580000003 HC RX 258

## 2023-11-09 PROCEDURE — 85027 COMPLETE CBC AUTOMATED: CPT

## 2023-11-09 PROCEDURE — 36415 COLL VENOUS BLD VENIPUNCTURE: CPT

## 2023-11-09 PROCEDURE — 97116 GAIT TRAINING THERAPY: CPT

## 2023-11-09 PROCEDURE — 83735 ASSAY OF MAGNESIUM: CPT

## 2023-11-09 PROCEDURE — 97535 SELF CARE MNGMENT TRAINING: CPT

## 2023-11-09 PROCEDURE — 6370000000 HC RX 637 (ALT 250 FOR IP)

## 2023-11-09 PROCEDURE — 97530 THERAPEUTIC ACTIVITIES: CPT

## 2023-11-09 PROCEDURE — 97110 THERAPEUTIC EXERCISES: CPT

## 2023-11-09 PROCEDURE — 1200000000 HC SEMI PRIVATE

## 2023-11-09 PROCEDURE — 80048 BASIC METABOLIC PNL TOTAL CA: CPT

## 2023-11-09 RX ORDER — POTASSIUM CHLORIDE 20 MEQ/1
40 TABLET, EXTENDED RELEASE ORAL ONCE
Status: COMPLETED | OUTPATIENT
Start: 2023-11-09 | End: 2023-11-09

## 2023-11-09 RX ORDER — LIDOCAINE 4 G/G
1 PATCH TOPICAL DAILY
Status: DISCONTINUED | OUTPATIENT
Start: 2023-11-09 | End: 2023-11-14 | Stop reason: HOSPADM

## 2023-11-09 RX ORDER — OXYCODONE HYDROCHLORIDE 5 MG/1
5 TABLET ORAL EVERY 6 HOURS PRN
Status: DISCONTINUED | OUTPATIENT
Start: 2023-11-09 | End: 2023-11-14 | Stop reason: HOSPADM

## 2023-11-09 RX ADMIN — APIXABAN 5 MG: 5 TABLET, FILM COATED ORAL at 21:05

## 2023-11-09 RX ADMIN — SODIUM CHLORIDE, PRESERVATIVE FREE 10 ML: 5 INJECTION INTRAVENOUS at 08:56

## 2023-11-09 RX ADMIN — OXYCODONE 5 MG: 5 TABLET ORAL at 09:39

## 2023-11-09 RX ADMIN — APIXABAN 5 MG: 5 TABLET, FILM COATED ORAL at 08:56

## 2023-11-09 RX ADMIN — Medication 3 MG: at 20:55

## 2023-11-09 RX ADMIN — POTASSIUM CHLORIDE 40 MEQ: 1500 TABLET, EXTENDED RELEASE ORAL at 08:55

## 2023-11-09 RX ADMIN — CITALOPRAM HYDROBROMIDE 20 MG: 20 TABLET ORAL at 20:55

## 2023-11-09 RX ADMIN — SODIUM CHLORIDE, PRESERVATIVE FREE 10 ML: 5 INJECTION INTRAVENOUS at 21:27

## 2023-11-09 NOTE — CARE COORDINATION
Chart reviewed day. Care managed by GI and IM. Spoke with Charlie Giraldo RN. Stated spoke with Aixa Garcia with GI, stated plan for colonoscopy with biopsy at some time. Will need off eliquis. Can be done as OP. From skilled at [facility] to return when medically ready. No barriers.  Davin Wu, RN

## 2023-11-09 NOTE — PROGRESS NOTES
Physical Therapy  Facility/Department: [facility] C3 TELE/MED SURG/ONC  Daily Treatment Note  NAME: Yue Hickey  : 1935  MRN: [id number]    Date of Service: 2023    Discharge Recommendations:  Subacute/Skilled Nursing Facility   PT Equipment Recommendations  Equipment Needed: No  Other: Defer    Patient Diagnosis(es): The primary encounter diagnosis was Diarrhea, unspecified type. Diagnoses of Failure to thrive in adult, Goals of care, counseling/discussion, and Hypokalemia were also pertinent to this visit. Assessment   Assessment: Pt with good participation in therapy this date. Able to complete bed mobility with SBA, transfers with CGA to SBA, and ambulate with CGA to SBA with a RW. Pt able to increased ambulation distance this date to ~200ft in miller. Pt will continue to benefit from skilled PT services to address current deficits. Continue to rec SNF at [place] when deemed medically appropraite. Activity Tolerance: Patient tolerated treatment well  Equipment Needed: No  Other: Defer     Plan    Physical Therapy Plan  General Plan: 3-5 times per week  Current Treatment Recommendations: Strengthening;ROM;Balance training;Gait training;Functional mobility training;Home exercise program;Therapeutic activities; Safety education & training;Transfer training; Endurance training;Patient/Caregiver education & training     Restrictions  Restrictions/Precautions  Restrictions/Precautions: Up as Tolerated, General Precautions  Required Braces or Orthoses?: Yes (wears TLSO when OOB, has had it prior to hospital entry)  Required Braces or Orthoses  Spinal: Thoracic Lumbar Sacral Orthotics  Position Activity Restriction  Other position/activity restrictions: IV,     Subjective    Subjective  Subjective: Pt in bed upon arrival, RN cleared for therapy. Pt pleasant and agreeable.   Pain: Denies pain at rest  Orientation  Overall Orientation Status: Within Functional Limits  Orientation Level: Oriented to person;Oriented to

## 2023-11-09 NOTE — PROGRESS NOTES
V2.0    [facility] [place] Progress Note      Name:  Cristian Solano /Age/Sex: 1935  (80 y.o. female)   MRN & CSN:  [id number] & [id number] Encounter Date/Time: 2023 3:07 PM EST   Location:  0337/0337-01 PCP: DEBBIE Guerra CNP     Attending:Erika Nuñez MD       Hospital Day: 3    Assessment and Recommendations   Cristian Solano is a 80 y.o. female with pmh of depression, history of acute DVT on Eliquis 5 mg twice daily, history compression fracture of L1, tremors on propanolol  who presents with Hypokalemia      Plan:     Hypokalemia  Likely secondary to diarrhea   Admission potassium 2.7  Potassium 3.4 this morning 23   40mg Kclr given    Diarrhea  Unknown etiology  Improved stool formation this morning  Patient tolerating P.O. intake   CT-IV contrast showed no acute abnormalities   G. I. following possible colonoscopy for biopsy for possible microscopic colitis  (+)fecal leukocytes   (-)GI pathogen PCR  Fecal rafael, fat and elastase in process. Tissue Transaminase pending     Hx of acute DVT left lower extremity  10/6/2023 lower extremity DVT study showed acute DVT  Patient on Eliquis 5 mg twice daily    Depression  Continue Celexa 10 mg twice daily at night    Tremors  Patient takes propanolol which is currently held due to low blood pressure      Diet ADULT DIET;  Clear Liquid   DVT Prophylaxis [] Lovenox, []  Heparin, [] SCDs, [] Ambulation,  [x] Eliquis, [] Xarelto  [] Coumadin   Code Status Limited   Disposition From: SNF  Expected Disposition: SNF  Estimated Date of Discharge: 2-3days   Patient requires continued admission due to Medical Management   Surrogate Decision Maker/ MANJU Schmidt     Personally reviewed Lab Studies and Imaging       Subjective:     Chief Complaint: Hypokalemia    Cristian Solano is a 80 y.o. female with pmh of pmh of depression, history of acute DVT on Eliquis 5 mg twice daily, tremors on propanolol  who presents with hypokalemia     Patient presented with abnormal 1. Distended large bowel loops filled with liquid stool consistent with diarrhea. No evidence of bowel obstruction. Small bowel appears unremarkable. 2. Minimal punctate right nephrolithiasis but no obstructive uropathy. 3. No evidence of intra-abdominal abscess, lymphadenopathy or tumor. 4. Severe compression injury at L1 with mild retropulsion. This is more severe, but was previously seen on June 24, 2021. Milder compression injuries at L3, and L5 likely related to osteopenia also previously noted. CBC:   Recent Labs     11/07/23  0335 11/08/23  0547 11/09/23  0522   WBC 4.5 3.6* 3.3*   HGB 12.1 11.2* 11.6*    209 205       BMP:    Recent Labs     11/07/23  0335 11/07/23  1009 11/08/23  0547 11/08/23  1051 11/08/23  1513 11/09/23 0522     --  142  --   --  141   K 2.6*   < > 3.0* 3.6 3.9 3.4*     --  103  --   --  104   CO2 31  --  29  --   --  30   BUN 11  --  6*  --   --  5*   CREATININE <0.5*  --  <0.5*  --   --  <0.5*   GLUCOSE 102*  --  92  --   --  90    < > = values in this interval not displayed. Hepatic:   Recent Labs     11/07/23  1009   AST 18   ALT 6*   BILITOT 0.4   ALKPHOS 92       Lipids:   Lab Results   Component Value Date/Time    CHOL 172 10/30/2018 08:48 AM    HDL 75 10/30/2018 08:48 AM    HDL 77 02/28/2012 08:57 AM    TRIG 94 10/30/2018 08:48 AM     Hemoglobin A1C:   Lab Results   Component Value Date/Time    LABA1C 5.5 05/15/2023 08:23 AM     TSH:   Lab Results   Component Value Date/Time    TSH 3.62 11/07/2023 10:09 AM     Troponin: No results found for: \"TROPONINT\"  Lactic Acid: No results for input(s): \"LACTA\" in the last 72 hours. BNP: No results for input(s): \"PROBNP\" in the last 72 hours.   UA:  Lab Results   Component Value Date/Time    NITRU Negative 11/13/2017 01:50 PM    COLORU Yellow 11/13/2017 01:50 PM    PHUR 7.5 11/13/2017 01:50 PM    CLARITYU Clear 11/13/2017 01:50 PM    SPECGRAV 1.010 11/13/2017 01:50 PM    LEUKOCYTESUR Negative

## 2023-11-09 NOTE — PROGRESS NOTES
Occupational Therapy  Facility/Department: [facility] C3 TELE/MED SURG/ONC  Daily Treatment Note  NAME: America Miller  : 1935  MRN: [id number]  Date of Service: 2023    Discharge Recommendations:  Subacute/Skilled Nursing Facility  OT Equipment Recommendations  Equipment Needed: No    AM-PAC score  AM-PAC Inpatient Daily Activity Raw Score: 18 (23 1022)  AM-PAC Inpatient ADL T-Scale Score : 38.66 (23 102)  ADL Inpatient CMS 0-100% Score: 46.65 (23 102)  ADL Inpatient CMS G-Code Modifier : CK (23)    Therapy discharge recommendations take into account each patient's current medical complexities and are subject to input/oversight from the patient's healthcare team.   Barriers to Home Discharge:   [] Steps to access home entry or bed/bath:   [] Unable to transfer, ambulate, or propel wheelchair household distances without assist   [x] Limited available assist at home upon discharge    [] Patient or family requests d/c to post-acute facility    [] Poor cognition/safety awareness for d/c to home alone    []Unable to maintain ordered weight bearing status    [] Patient with salient signs of long-standing immobility   [x] Patient is at risk for falls    [x] Other: Decreased safety and independence w/ ADLs. If pt is unable to be seen after this session, please let this note serve as discharge summary. Please see case management note for discharge disposition. Thank you. Patient Diagnosis(es): The primary encounter diagnosis was Diarrhea, unspecified type. Diagnoses of Failure to thrive in adult, Goals of care, counseling/discussion, and Hypokalemia were also pertinent to this visit. Assessment    Assessment: Pt received for OT session resting in bed to address current functional deficits that inhibit independence and safety with ADLs and functional mobility. Pt agreeable to session, reporting little pain in back, meds received.  During session, pt completed bed mobility w/

## 2023-11-10 LAB
ANION GAP SERPL CALCULATED.3IONS-SCNC: 7 MMOL/L (ref 3–16)
BUN SERPL-MCNC: 4 MG/DL (ref 7–20)
CALCIUM SERPL-MCNC: 8.6 MG/DL (ref 8.3–10.6)
CHLORIDE SERPL-SCNC: 103 MMOL/L (ref 99–110)
CO2 SERPL-SCNC: 29 MMOL/L (ref 21–32)
CREAT SERPL-MCNC: <0.5 MG/DL (ref 0.6–1.2)
DEPRECATED RDW RBC AUTO: 17.7 % (ref 12.4–15.4)
GFR SERPLBLD CREATININE-BSD FMLA CKD-EPI: >60 ML/MIN/{1.73_M2}
GLUCOSE SERPL-MCNC: 90 MG/DL (ref 70–99)
HCT VFR BLD AUTO: 34.3 % (ref 36–48)
HGB BLD-MCNC: 11.2 G/DL (ref 12–16)
MCH RBC QN AUTO: 29.8 PG (ref 26–34)
MCHC RBC AUTO-ENTMCNC: 32.6 G/DL (ref 31–36)
MCV RBC AUTO: 91.2 FL (ref 80–100)
PLATELET # BLD AUTO: 207 K/UL (ref 135–450)
PMV BLD AUTO: 7.6 FL (ref 5–10.5)
POTASSIUM SERPL-SCNC: 3.5 MMOL/L (ref 3.5–5.1)
POTASSIUM SERPL-SCNC: 3.6 MMOL/L (ref 3.5–5.1)
RBC # BLD AUTO: 3.76 M/UL (ref 4–5.2)
SODIUM SERPL-SCNC: 139 MMOL/L (ref 136–145)
WBC # BLD AUTO: 3.7 K/UL (ref 4–11)

## 2023-11-10 PROCEDURE — 6370000000 HC RX 637 (ALT 250 FOR IP)

## 2023-11-10 PROCEDURE — 84132 ASSAY OF SERUM POTASSIUM: CPT

## 2023-11-10 PROCEDURE — 97116 GAIT TRAINING THERAPY: CPT

## 2023-11-10 PROCEDURE — 85027 COMPLETE CBC AUTOMATED: CPT

## 2023-11-10 PROCEDURE — 36415 COLL VENOUS BLD VENIPUNCTURE: CPT

## 2023-11-10 PROCEDURE — 97530 THERAPEUTIC ACTIVITIES: CPT

## 2023-11-10 PROCEDURE — 6360000002 HC RX W HCPCS: Performed by: INTERNAL MEDICINE

## 2023-11-10 PROCEDURE — 1200000000 HC SEMI PRIVATE

## 2023-11-10 PROCEDURE — 80048 BASIC METABOLIC PNL TOTAL CA: CPT

## 2023-11-10 PROCEDURE — 2580000003 HC RX 258

## 2023-11-10 RX ORDER — ENOXAPARIN SODIUM 100 MG/ML
1 INJECTION SUBCUTANEOUS 2 TIMES DAILY
Status: COMPLETED | OUTPATIENT
Start: 2023-11-10 | End: 2023-11-12

## 2023-11-10 RX ADMIN — ACETAMINOPHEN 650 MG: 325 TABLET ORAL at 11:58

## 2023-11-10 RX ADMIN — SODIUM CHLORIDE, PRESERVATIVE FREE 10 ML: 5 INJECTION INTRAVENOUS at 21:42

## 2023-11-10 RX ADMIN — ENOXAPARIN SODIUM 50 MG: 100 INJECTION SUBCUTANEOUS at 21:39

## 2023-11-10 RX ADMIN — CITALOPRAM HYDROBROMIDE 20 MG: 20 TABLET ORAL at 21:39

## 2023-11-10 RX ADMIN — ACETAMINOPHEN 650 MG: 325 TABLET ORAL at 17:51

## 2023-11-10 RX ADMIN — ACETAMINOPHEN 650 MG: 325 TABLET ORAL at 00:33

## 2023-11-10 RX ADMIN — Medication 3 MG: at 21:39

## 2023-11-10 RX ADMIN — ENOXAPARIN SODIUM 50 MG: 100 INJECTION SUBCUTANEOUS at 11:59

## 2023-11-10 RX ADMIN — SODIUM CHLORIDE, PRESERVATIVE FREE 10 ML: 5 INJECTION INTRAVENOUS at 12:01

## 2023-11-10 ASSESSMENT — PAIN DESCRIPTION - LOCATION
LOCATION: BACK

## 2023-11-10 ASSESSMENT — PAIN DESCRIPTION - DESCRIPTORS: DESCRIPTORS: ACHING;SORE

## 2023-11-10 ASSESSMENT — PAIN SCALES - GENERAL
PAINLEVEL_OUTOF10: 6
PAINLEVEL_OUTOF10: 5
PAINLEVEL_OUTOF10: 0
PAINLEVEL_OUTOF10: 3

## 2023-11-10 ASSESSMENT — PAIN DESCRIPTION - ORIENTATION: ORIENTATION: LOWER

## 2023-11-10 ASSESSMENT — PAIN DESCRIPTION - PAIN TYPE: TYPE: CHRONIC PAIN

## 2023-11-10 NOTE — PLAN OF CARE
Problem: Pain  Goal: Verbalizes/displays adequate comfort level or baseline comfort level  Outcome: Progressing  Flowsheets (Taken 11/9/2023 2020)  Verbalizes/displays adequate comfort level or baseline comfort level:   Encourage patient to monitor pain and request assistance   Assess pain using appropriate pain scale   Administer analgesics based on type and severity of pain and evaluate response   Implement non-pharmacological measures as appropriate and evaluate response

## 2023-11-10 NOTE — CARE COORDINATION
Chart reviewed day 3. Spoke with dr Ronald Guzman. Plan to hold eliquis for colonoscopy with bx Monday. Plan to return skilled at [facility] head when medically ready.  Kelley Gibbs RN

## 2023-11-10 NOTE — PROGRESS NOTES
asymmetrical  Stance: Left decreased  Gait Abnormalities: Decreased step clearance;Trunk sway increased; Steppage gait     PT Exercises  Exercise Treatment: seated B LE ankle DF/PF, knee ext, hip flex, glute sets x 10 reps     Safety Devices  Type of Devices: Call light within reach;Gait belt;Left in chair;Nurse notified       Goals  Short Term Goals  Time Frame for Short Term Goals: 11/14/23 -- 11/09 continue all goals  Short Term Goal 1: pt will complete bed mobility with SBA -- MET 11/09  Short Term Goal 2: pt will complete transfers with SBA  Short Term Goal 3: pt will ambulate 100ft with LRAD and supervision - 11/10 met distance with sba   Short Term Goal 4: pt will participate in 10-12 reps of BLE exercises by 11/10/23 - met, ongoing   Additional Goals?: No  Patient Goals   Patient Goals : \"To go home\"    Education  Patient Education  Education Given To: Patient  Education Provided: Role of Therapy;Plan of Care;Equipment;Transfer Training  Education Method: Verbal  Barriers to Learning: None  Education Outcome: Verbalized understanding;Demonstrated understanding    Therapy Time   Individual Concurrent Group Co-treatment   Time In 1412         Time Out 1442         Minutes 30         Timed Code Treatment Minutes: 30 Minutes     If pt is unable to be seen after this session, please let this note serve as discharge summary. Please see case management note for discharge disposition. Thank you.    Melania Gil, PT

## 2023-11-10 NOTE — PROGRESS NOTES
Shift assessment completed & charted. VSS on RA. Pt & family updated on current plan, verbalizes understanding. Pt reports pain in lower back, PRN medication given per MAR. Denies any further needs at this time. Bed locked & in lowest position. Call light & bedside table within reach.

## 2023-11-10 NOTE — PLAN OF CARE
Problem: Safety - Adult  Goal: Free from fall injury  Outcome: Progressing  Flowsheets (Taken 11/10/2023 1451)  Free From Fall Injury:   Instruct family/caregiver on patient safety   Based on caregiver fall risk screen, instruct family/caregiver to ask for assistance with transferring infant if caregiver noted to have fall risk factors     Problem: Skin/Tissue Integrity  Goal: Absence of new skin breakdown  Description: 1. Monitor for areas of redness and/or skin breakdown  2. Assess vascular access sites hourly  3. Every 4-6 hours minimum:  Change oxygen saturation probe site  4. Every 4-6 hours:  If on nasal continuous positive airway pressure, respiratory therapy assess nares and determine need for appliance change or resting period.   Outcome: Progressing     Problem: Pain  Goal: Verbalizes/displays adequate comfort level or baseline comfort level  Outcome: Progressing  Flowsheets (Taken 11/10/2023 1451)  Verbalizes/displays adequate comfort level or baseline comfort level:   Encourage patient to monitor pain and request assistance   Assess pain using appropriate pain scale   Administer analgesics based on type and severity of pain and evaluate response   Implement non-pharmacological measures as appropriate and evaluate response

## 2023-11-10 NOTE — PROGRESS NOTES
V2.0    [facility] [place]      Name:  Cristian Solano /Age/Sex: 1935  (80 y.o. female)   MRN & CSN:  [id number] & [id number] Encounter Date/Time: 11/10/2023 3:07 PM EST   Location:  [id number] PCP: DEBBIE Guerra CNP     Attending:Erika Nuñez MD       Hospital Day: 4    Assessment and Recommendations   Cristian Solano is a 80 y.o. female with pmh of depression, history of acute DVT on Eliquis 5 mg twice daily, history compression fracture of L1, tremors on propanolol  who presents with Hypokalemia      Plan:     Hypokalemia  Likely secondary to diarrhea   Admission potassium 2.7  Potassium was 3.6 this morning 11/10/2023  Potassium in the afternoon and continue to monitor tomorrow with a.m. labs    Diarrhea  Unknown etiology  Patient is diarrhea returned to baseline  CT-IV contrast showed no acute abnormalities   G. I. following possible colonoscopy for biopsy for possible microscopic colitis  (+)fecal leukocytes   (-)GI pathogen PCR  Flex sig for biopsies planned for Monday    Hx of acute DVT left lower extremity  10/6/2023 lower extremity DVT study showed acute DVT  Flex sig for biopsies planned for 2023  She will be on Eliquis 5 mg twice daily today  Switch to Lovenox tomorrow full dose until  night  Patient will be restarted on Eliquis 2023 after surgery    Depression  Continue Celexa 10 mg twice daily at night    Tremors  Patient takes propanolol which is currently held due to low blood pressure      Diet ADULT DIET;  Regular   DVT Prophylaxis [] Lovenox, []  Heparin, [] SCDs, [] Ambulation,  [x] Eliquis, [] Xarelto  [] Coumadin   Code Status Limited   Disposition From: SNF  Expected Disposition: SNF  Estimated Date of Discharge: 2-3days   Patient requires continued admission due to Medical Management   Surrogate Decision Maker/ MANJU Schmidt     Personally reviewed Lab Studies and Imaging       Subjective:     Chief Complaint: Hypokalemia    Cristian Solano is enoxaparin  1 mg/kg SubCUTAneous BID    apixaban  5 mg Oral Once    lidocaine  1 patch TransDERmal Daily    acetaminophen  650 mg Oral Q6H    apixaban  5 mg Oral BID    melatonin  3 mg Oral Nightly    sodium chloride flush  5-40 mL IntraVENous 2 times per day    citalopram  20 mg Oral QHS      Infusions:    sodium chloride       PRN Meds: oxyCODONE, 5 mg, Q6H PRN  sodium chloride flush, 5-40 mL, PRN  sodium chloride, , PRN  ondansetron, 4 mg, Q8H PRN   Or  ondansetron, 4 mg, Q6H PRN  polyethylene glycol, 17 g, Daily PRN  acetaminophen, 650 mg, Q6H PRN   Or  acetaminophen, 650 mg, Q6H PRN        Labs and Imaging   CT ABDOMEN PELVIS W IV CONTRAST Additional Contrast? Oral    Result Date: 11/7/2023  EXAMINATION: CT OF THE ABDOMEN AND PELVIS WITH CONTRAST 11/7/2023 6:46 pm TECHNIQUE: CT of the abdomen and pelvis was performed with the administration of intravenous contrast. Multiplanar reformatted images are provided for review. Automated exposure control, iterative reconstruction, and/or weight based adjustment of the mA/kV was utilized to reduce the radiation dose to as low as reasonably achievable. COMPARISON: 06/24/2021 HISTORY: ORDERING SYSTEM PROVIDED HISTORY: diarrhea x 2 months, weight loss TECHNOLOGIST PROVIDED HISTORY: Reason for exam:->diarrhea x 2 months, weight loss Additional Contrast?->Oral Reason for Exam: diarrhea, abd pain FINDINGS: Lower Chest: Lung bases demonstrate no active disease. Organs: Liver appears unremarkable. No acute gallbladder or biliary disease. Pancreas, spleen, adrenals, aorta, and IVC appear stable. Right nephrolithiasis with a 3 mm stone in lower pole calyx of the right kidney. No obstructive uropathy. GI/Bowel: Distended, fluid-filled large bowel loops without evidence of bowel wall thickening. Findings are consistent with diarrhea. Small bowel appears unremarkable. No evidence of colonic masses. Pelvis: Urinary bladder appears unremarkable.   Reproductive organs appear

## 2023-11-10 NOTE — PROGRESS NOTES
Pt is alert and oriented. VSS. RA. Pt c/o 6/10 pain. Pt given scheduled tylenol and lido patch today. Pt with diarrhea at times. Pt was able to tolerate at regular diet this afternoon for lunch. Shift assessment completed and documented. Call light within reach. Bed side table within reach. Wheels locked. Bed in lowest position. Bed check in place. Pt instructed to call out for assistance. Pt expressesed understanding & calls out appropritately. All care per orders. Electronically signed by Oliva Brady RN on 11/10/2023 at 2:54 PM

## 2023-11-11 LAB
ANION GAP SERPL CALCULATED.3IONS-SCNC: 8 MMOL/L (ref 3–16)
BUN SERPL-MCNC: 6 MG/DL (ref 7–20)
CALCIUM SERPL-MCNC: 8.6 MG/DL (ref 8.3–10.6)
CHLORIDE SERPL-SCNC: 103 MMOL/L (ref 99–110)
CO2 SERPL-SCNC: 29 MMOL/L (ref 21–32)
CREAT SERPL-MCNC: <0.5 MG/DL (ref 0.6–1.2)
DEPRECATED RDW RBC AUTO: 17.7 % (ref 12.4–15.4)
GFR SERPLBLD CREATININE-BSD FMLA CKD-EPI: >60 ML/MIN/{1.73_M2}
GLUCOSE SERPL-MCNC: 92 MG/DL (ref 70–99)
HCT VFR BLD AUTO: 36.2 % (ref 36–48)
HGB BLD-MCNC: 11.8 G/DL (ref 12–16)
MAGNESIUM SERPL-MCNC: 1.9 MG/DL (ref 1.8–2.4)
MCH RBC QN AUTO: 29.7 PG (ref 26–34)
MCHC RBC AUTO-ENTMCNC: 32.6 G/DL (ref 31–36)
MCV RBC AUTO: 91.2 FL (ref 80–100)
MISCELLANEOUS LAB TEST ORDER: NORMAL
PLATELET # BLD AUTO: 209 K/UL (ref 135–450)
PMV BLD AUTO: 7.6 FL (ref 5–10.5)
POTASSIUM SERPL-SCNC: 3.2 MMOL/L (ref 3.5–5.1)
RBC # BLD AUTO: 3.96 M/UL (ref 4–5.2)
SODIUM SERPL-SCNC: 140 MMOL/L (ref 136–145)
WBC # BLD AUTO: 4.4 K/UL (ref 4–11)

## 2023-11-11 PROCEDURE — 1200000000 HC SEMI PRIVATE

## 2023-11-11 PROCEDURE — 6360000002 HC RX W HCPCS: Performed by: INTERNAL MEDICINE

## 2023-11-11 PROCEDURE — 6370000000 HC RX 637 (ALT 250 FOR IP)

## 2023-11-11 PROCEDURE — 80048 BASIC METABOLIC PNL TOTAL CA: CPT

## 2023-11-11 PROCEDURE — 83735 ASSAY OF MAGNESIUM: CPT

## 2023-11-11 PROCEDURE — 6370000000 HC RX 637 (ALT 250 FOR IP): Performed by: STUDENT IN AN ORGANIZED HEALTH CARE EDUCATION/TRAINING PROGRAM

## 2023-11-11 PROCEDURE — 36415 COLL VENOUS BLD VENIPUNCTURE: CPT

## 2023-11-11 PROCEDURE — 2580000003 HC RX 258

## 2023-11-11 PROCEDURE — 85027 COMPLETE CBC AUTOMATED: CPT

## 2023-11-11 RX ORDER — POTASSIUM CHLORIDE 20 MEQ/1
40 TABLET, EXTENDED RELEASE ORAL ONCE
Status: COMPLETED | OUTPATIENT
Start: 2023-11-11 | End: 2023-11-11

## 2023-11-11 RX ADMIN — OXYCODONE 5 MG: 5 TABLET ORAL at 21:16

## 2023-11-11 RX ADMIN — ACETAMINOPHEN 650 MG: 325 TABLET ORAL at 11:48

## 2023-11-11 RX ADMIN — ACETAMINOPHEN 650 MG: 325 TABLET ORAL at 00:29

## 2023-11-11 RX ADMIN — SODIUM CHLORIDE, PRESERVATIVE FREE 10 ML: 5 INJECTION INTRAVENOUS at 08:03

## 2023-11-11 RX ADMIN — ENOXAPARIN SODIUM 50 MG: 100 INJECTION SUBCUTANEOUS at 21:16

## 2023-11-11 RX ADMIN — ACETAMINOPHEN 650 MG: 325 TABLET ORAL at 06:21

## 2023-11-11 RX ADMIN — CITALOPRAM HYDROBROMIDE 20 MG: 20 TABLET ORAL at 21:16

## 2023-11-11 RX ADMIN — ENOXAPARIN SODIUM 50 MG: 100 INJECTION SUBCUTANEOUS at 08:03

## 2023-11-11 RX ADMIN — POTASSIUM CHLORIDE 40 MEQ: 1500 TABLET, EXTENDED RELEASE ORAL at 10:07

## 2023-11-11 RX ADMIN — Medication 3 MG: at 21:16

## 2023-11-11 RX ADMIN — SODIUM CHLORIDE, PRESERVATIVE FREE 10 ML: 5 INJECTION INTRAVENOUS at 21:19

## 2023-11-11 ASSESSMENT — PAIN DESCRIPTION - DESCRIPTORS: DESCRIPTORS: ACHING

## 2023-11-11 ASSESSMENT — PAIN SCALES - GENERAL
PAINLEVEL_OUTOF10: 1
PAINLEVEL_OUTOF10: 0
PAINLEVEL_OUTOF10: 7

## 2023-11-11 ASSESSMENT — PAIN DESCRIPTION - LOCATION
LOCATION: BACK
LOCATION: BACK

## 2023-11-11 ASSESSMENT — PAIN - FUNCTIONAL ASSESSMENT: PAIN_FUNCTIONAL_ASSESSMENT: ACTIVITIES ARE NOT PREVENTED

## 2023-11-11 ASSESSMENT — PAIN DESCRIPTION - PAIN TYPE: TYPE: CHRONIC PAIN

## 2023-11-11 ASSESSMENT — PAIN DESCRIPTION - ORIENTATION: ORIENTATION: LOWER

## 2023-11-11 NOTE — PLAN OF CARE
Problem: Safety - Adult  Goal: Free from fall injury  11/11/2023 1515 by Tamica Zafar RN  Outcome: Progressing  Flowsheets (Taken 11/11/2023 1515)  Free From Fall Injury: Instruct family/caregiver on patient safety  11/11/2023 0350 by Lenore Lobo  Outcome: Progressing  Flowsheets (Taken 11/10/2023 1451 by Anabel Patterson RN)  Free From Fall Injury:   Instruct family/caregiver on patient safety   Based on caregiver fall risk screen, instruct family/caregiver to ask for assistance with transferring infant if caregiver noted to have fall risk factors     Problem: Skin/Tissue Integrity  Goal: Absence of new skin breakdown  Description: 1. Monitor for areas of redness and/or skin breakdown  2. Assess vascular access sites hourly  3. Every 4-6 hours minimum:  Change oxygen saturation probe site  4. Every 4-6 hours:  If on nasal continuous positive airway pressure, respiratory therapy assess nares and determine need for appliance change or resting period.   11/11/2023 1515 by Tamica Zafar RN  Outcome: Progressing  11/11/2023 0350 by Lenore Lobo  Outcome: Progressing     Problem: ABCDS Injury Assessment  Goal: Absence of physical injury  11/11/2023 0350 by Lenore Lobo  Outcome: Progressing  Flowsheets (Taken 11/8/2023 1811 by Pao Stockton RN)  Absence of Physical Injury: Implement safety measures based on patient assessment     Problem: Gastrointestinal - Adult  Goal: Maintains or returns to baseline bowel function  Outcome: Progressing  Flowsheets (Taken 11/11/2023 1515)  Maintains or returns to baseline bowel function:   Assess bowel function   Encourage mobilization and activity   Encourage oral fluids to ensure adequate hydration  Goal: Maintains adequate nutritional intake  Outcome: Progressing  Flowsheets (Taken 11/11/2023 1515)  Maintains adequate nutritional intake:   Monitor percentage of each meal consumed   Assist with meals as needed   Monitor intake and output, weight and lab values Klever Mack RN  11/11/2023

## 2023-11-11 NOTE — PROGRESS NOTES
V2.0    [facility] [place] [facility] [place]      Name:  Michelle Conley /Age/Sex: 1935  (80 y.o. female)   MRN & CSN:  [id number] & [id number] Encounter Date/Time: 2023 3:07 PM EST   Location:  0337- PCP: DEBBIE Khalil CNP     Attending:Daniel Nuñez MD       Hospital Day: 5    Assessment and Recommendations   Michelle Conley is a 80 y.o. female with pmh of depression, history of acute DVT on Eliquis 5 mg twice daily, history compression fracture of L1, tremors on propanolol  who presents with Hypokalemia      Plan:     Hypokalemia  Likely secondary to diarrhea   Admission potassium 2.7  - Replace PRN    Diarrhea  Unknown etiology  Patient is diarrhea returned to baseline  CT-IV contrast showed no acute abnormalities   G. I. following possible colonoscopy for biopsy for possible microscopic colitis  (+)fecal leukocytes   (-)GI pathogen PCR  Flex sig for biopsies planned for Monday    Hx of acute DVT left lower extremity  10/6/2023 lower extremity DVT study showed acute DVT  Flex sig for biopsies planned for 2023  She will be on Eliquis 5 mg twice daily today  Switch to Lovenox tomorrow full dose until  night  Patient will be restarted on Eliquis 2023 after surgery    Depression  Continue Celexa 10 mg twice daily at night    Tremors  Patient takes propanolol which is currently held due to low blood pressure      Diet ADULT DIET;  Regular   DVT Prophylaxis [] Lovenox, []  Heparin, [] SCDs, [] Ambulation,  [x] Eliquis, [] Xarelto  [] Coumadin   Code Status Limited   Disposition From: SNF  Expected Disposition: SNF  Estimated Date of Discharge: 2-3days   Patient requires continued admission due to Medical Management   Surrogate Decision Maker/ MANJU Arriaga     Personally reviewed Lab Studies and Imaging       Subjective:     Chief Complaint: Hypokalemia    Michelle Conley is a 80 y.o. female with pmh of pmh of depression, history of acute DVT on Eliquis 5 mg twice daily,

## 2023-11-11 NOTE — PLAN OF CARE
Problem: Safety - Adult  Goal: Free from fall injury  11/11/2023 0350 by Fátima Caballero  Outcome: Progressing  Flowsheets (Taken 11/10/2023 1451 by Malika Redd RN)  Free From Fall Injury:   Instruct family/caregiver on patient safety   Based on caregiver fall risk screen, instruct family/caregiver to ask for assistance with transferring infant if caregiver noted to have fall risk factors  11/10/2023 1451 by Malika Redd RN  Outcome: Progressing  Flowsheets (Taken 11/10/2023 1451)  Free From Fall Injury:   Instruct family/caregiver on patient safety   Based on caregiver fall risk screen, instruct family/caregiver to ask for assistance with transferring infant if caregiver noted to have fall risk factors     Problem: Skin/Tissue Integrity  Goal: Absence of new skin breakdown  Description: 1. Monitor for areas of redness and/or skin breakdown  2. Assess vascular access sites hourly  3. Every 4-6 hours minimum:  Change oxygen saturation probe site  4. Every 4-6 hours:  If on nasal continuous positive airway pressure, respiratory therapy assess nares and determine need for appliance change or resting period.   11/11/2023 0350 by Fátima Caballero  Outcome: Progressing  11/10/2023 1451 by Malika Redd RN  Outcome: Progressing     Problem: ABCDS Injury Assessment  Goal: Absence of physical injury  Outcome: Progressing  Flowsheets (Taken 11/8/2023 1811 by Elroy Kc RN)  Absence of Physical Injury: Implement safety measures based on patient assessment     Problem: Pain  Goal: Verbalizes/displays adequate comfort level or baseline comfort level  11/10/2023 1451 by Malika Redd RN  Outcome: Progressing  Flowsheets (Taken 11/10/2023 1451)  Verbalizes/displays adequate comfort level or baseline comfort level:   Encourage patient to monitor pain and request assistance   Assess pain using appropriate pain scale   Administer analgesics based on type and severity of pain and evaluate response   Implement

## 2023-11-12 LAB
ANION GAP SERPL CALCULATED.3IONS-SCNC: 7 MMOL/L (ref 3–16)
BUN SERPL-MCNC: 8 MG/DL (ref 7–20)
CALCIUM SERPL-MCNC: 8.9 MG/DL (ref 8.3–10.6)
CHLORIDE SERPL-SCNC: 104 MMOL/L (ref 99–110)
CO2 SERPL-SCNC: 31 MMOL/L (ref 21–32)
CREAT SERPL-MCNC: <0.5 MG/DL (ref 0.6–1.2)
DEPRECATED RDW RBC AUTO: 17.9 % (ref 12.4–15.4)
GFR SERPLBLD CREATININE-BSD FMLA CKD-EPI: >60 ML/MIN/{1.73_M2}
GLUCOSE SERPL-MCNC: 90 MG/DL (ref 70–99)
HCT VFR BLD AUTO: 35.3 % (ref 36–48)
HGB BLD-MCNC: 11.5 G/DL (ref 12–16)
MCH RBC QN AUTO: 29.6 PG (ref 26–34)
MCHC RBC AUTO-ENTMCNC: 32.6 G/DL (ref 31–36)
MCV RBC AUTO: 90.6 FL (ref 80–100)
PLATELET # BLD AUTO: 229 K/UL (ref 135–450)
PMV BLD AUTO: 7.6 FL (ref 5–10.5)
POTASSIUM SERPL-SCNC: 4.2 MMOL/L (ref 3.5–5.1)
RBC # BLD AUTO: 3.89 M/UL (ref 4–5.2)
SODIUM SERPL-SCNC: 142 MMOL/L (ref 136–145)
WBC # BLD AUTO: 4.3 K/UL (ref 4–11)

## 2023-11-12 PROCEDURE — 97535 SELF CARE MNGMENT TRAINING: CPT

## 2023-11-12 PROCEDURE — 36415 COLL VENOUS BLD VENIPUNCTURE: CPT

## 2023-11-12 PROCEDURE — 6370000000 HC RX 637 (ALT 250 FOR IP)

## 2023-11-12 PROCEDURE — 85027 COMPLETE CBC AUTOMATED: CPT

## 2023-11-12 PROCEDURE — 1200000000 HC SEMI PRIVATE

## 2023-11-12 PROCEDURE — 80048 BASIC METABOLIC PNL TOTAL CA: CPT

## 2023-11-12 PROCEDURE — 6360000002 HC RX W HCPCS: Performed by: INTERNAL MEDICINE

## 2023-11-12 PROCEDURE — 2580000003 HC RX 258

## 2023-11-12 PROCEDURE — 97530 THERAPEUTIC ACTIVITIES: CPT

## 2023-11-12 PROCEDURE — 97110 THERAPEUTIC EXERCISES: CPT

## 2023-11-12 RX ORDER — BISACODYL 5 MG/1
20 TABLET, DELAYED RELEASE ORAL ONCE
Status: DISCONTINUED | OUTPATIENT
Start: 2023-11-12 | End: 2023-11-14 | Stop reason: HOSPADM

## 2023-11-12 RX ADMIN — ENOXAPARIN SODIUM 50 MG: 100 INJECTION SUBCUTANEOUS at 21:10

## 2023-11-12 RX ADMIN — ACETAMINOPHEN 650 MG: 325 TABLET ORAL at 21:10

## 2023-11-12 RX ADMIN — Medication 3 MG: at 21:10

## 2023-11-12 RX ADMIN — CITALOPRAM HYDROBROMIDE 20 MG: 20 TABLET ORAL at 21:10

## 2023-11-12 RX ADMIN — ENOXAPARIN SODIUM 50 MG: 100 INJECTION SUBCUTANEOUS at 10:19

## 2023-11-12 RX ADMIN — ACETAMINOPHEN 650 MG: 325 TABLET ORAL at 06:34

## 2023-11-12 RX ADMIN — SODIUM CHLORIDE, PRESERVATIVE FREE 10 ML: 5 INJECTION INTRAVENOUS at 21:11

## 2023-11-12 RX ADMIN — ACETAMINOPHEN 650 MG: 325 TABLET ORAL at 14:59

## 2023-11-12 ASSESSMENT — PAIN SCALES - GENERAL
PAINLEVEL_OUTOF10: 5
PAINLEVEL_OUTOF10: 0
PAINLEVEL_OUTOF10: 0

## 2023-11-12 ASSESSMENT — PAIN DESCRIPTION - DESCRIPTORS: DESCRIPTORS: ACHING

## 2023-11-12 ASSESSMENT — PAIN DESCRIPTION - LOCATION: LOCATION: BACK

## 2023-11-12 NOTE — PROGRESS NOTES
V2.0    [facility] [place] Progress Note      Name:  Dar Jones /Age/Sex: 1935  (80 y.o. female)   MRN & CSN:  [id number] & [id number] Encounter Date/Time: 2023 3:07 PM EST   Location:  [id number]- PCP: DEBBIE Sadler - [place]     Attending:Skyla Nuñez MD       Hospital Day: 6    Assessment and Recommendations   Dar Jones is a 80 y.o. female with pmh of depression, history of acute DVT on Eliquis 5 mg twice daily, history compression fracture of L1, tremors on propanolol  who presents with Hypokalemia      Plan:     Hypokalemia  Likely secondary to diarrhea   Admission potassium 2.7. Remaining stable,   - Replace PRN    Diarrhea  Unknown etiology  Patient's diarrhea returned to baseline  CT-IV contrast showed no acute abnormalities   G. I. following, plan for Flex sig with biopsies on . Possible d/c  or , pending GI w/u  Cont Ursodiol  (+)fecal leukocytes   (-)GI pathogen PCR      Hx of acute DVT left lower extremity  10/6/2023 lower extremity DVT study showed acute DVT  Flex sig for biopsies planned for 2023  Cont Lovenox until night of   Patient will be restarted on Eliquis 5mg BID 2023 after surgery    Depression  Continue Celexa 10 mg BID nightly. Tremors  Patient takes propanolol, currently held due to low blood pressure      Diet Diet NPO  ADULT DIET;  Clear Liquid   DVT Prophylaxis [x] Lovenox, []  Heparin, [] SCDs, [] Ambulation,  [x] Eliquis, [] Xarelto  [] Coumadin   Code Status Limited   Disposition From: SNF  Expected Disposition: SNF  Estimated Date of Discharge: 2-3days   Patient requires continued admission due to Medical Management   Surrogate Decision Maker/ MANJU Hinton     Personally reviewed Lab Studies and Imaging       Subjective:     Chief Complaint: Hypokalemia    Dar Jones is a 80 y.o. female with pmh of pmh of depression, history of acute DVT on Eliquis 5 mg twice daily, tremors on propanolol  who presents with edema  Skin: warm, dry  Neuro: Alert. Psych: Mood appropriate. Medications:   Medications:    bisacodyl  20 mg Oral Once    enoxaparin  1 mg/kg SubCUTAneous BID    lidocaine  1 patch TransDERmal Daily    acetaminophen  650 mg Oral Q6H    [Held by provider] apixaban  5 mg Oral BID    melatonin  3 mg Oral Nightly    sodium chloride flush  5-40 mL IntraVENous 2 times per day    citalopram  20 mg Oral QHS      Infusions:    sodium chloride       PRN Meds: oxyCODONE, 5 mg, Q6H PRN  sodium chloride flush, 5-40 mL, PRN  sodium chloride, , PRN  ondansetron, 4 mg, Q8H PRN   Or  ondansetron, 4 mg, Q6H PRN  polyethylene glycol, 17 g, Daily PRN  acetaminophen, 650 mg, Q6H PRN   Or  acetaminophen, 650 mg, Q6H PRN        Labs and Imaging   CT ABDOMEN PELVIS W IV CONTRAST Additional Contrast? Oral    Result Date: 11/7/2023  EXAMINATION: CT OF THE ABDOMEN AND PELVIS WITH CONTRAST 11/7/2023 6:46 pm TECHNIQUE: CT of the abdomen and pelvis was performed with the administration of intravenous contrast. Multiplanar reformatted images are provided for review. Automated exposure control, iterative reconstruction, and/or weight based adjustment of the mA/kV was utilized to reduce the radiation dose to as low as reasonably achievable. COMPARISON: 06/24/2021 HISTORY: ORDERING SYSTEM PROVIDED HISTORY: diarrhea x 2 months, weight loss TECHNOLOGIST PROVIDED HISTORY: Reason for exam:->diarrhea x 2 months, weight loss Additional Contrast?->Oral Reason for Exam: diarrhea, abd pain FINDINGS: Lower Chest: Lung bases demonstrate no active disease. Organs: Liver appears unremarkable. No acute gallbladder or biliary disease. Pancreas, spleen, adrenals, aorta, and IVC appear stable. Right nephrolithiasis with a 3 mm stone in lower pole calyx of the right kidney. No obstructive uropathy. GI/Bowel: Distended, fluid-filled large bowel loops without evidence of bowel wall thickening. Findings are consistent with diarrhea.   Small bowel appears

## 2023-11-12 NOTE — PLAN OF CARE
Problem: Safety - Adult  Goal: Free from fall injury  Outcome: Progressing  Flowsheets (Taken 11/12/2023 1453)  Free From Fall Injury:   Instruct family/caregiver on patient safety   Based on caregiver fall risk screen, instruct family/caregiver to ask for assistance with transferring infant if caregiver noted to have fall risk factors     Problem: Skin/Tissue Integrity  Goal: Absence of new skin breakdown  Description: 1. Monitor for areas of redness and/or skin breakdown  2. Assess vascular access sites hourly  3. Every 4-6 hours minimum:  Change oxygen saturation probe site  4. Every 4-6 hours:  If on nasal continuous positive airway pressure, respiratory therapy assess nares and determine need for appliance change or resting period.   Outcome: Progressing     Problem: Gastrointestinal - Adult  Goal: Minimal or absence of nausea and vomiting  Outcome: Progressing  Flowsheets (Taken 11/12/2023 1453)  Minimal or absence of nausea and vomiting:   Provide nonpharmacologic comfort measures as appropriate   Advance diet as tolerated, if ordered  Goal: Maintains adequate nutritional intake  Outcome: Progressing  Flowsheets (Taken 11/12/2023 1453)  Maintains adequate nutritional intake:   Monitor percentage of each meal consumed   Assist with meals as needed   Lucas Grey RN  11/12/2023

## 2023-11-12 NOTE — PROGRESS NOTES
PROGRESS NOTE  S:88 yrs Patient  admitted on 11/7/2023 with Hypokalemia [E87.6]  Failure to thrive in adult [R62.7]  Goals of care, counseling/discussion [Z71.89]  Diarrhea, unspecified type [R19.7] . Today she feels well. No further diarrhea. Tolerating diet. Sitting up in chair.       Exam:   Vitals:    11/12/23 0859   BP: 102/64   Pulse: 80   Resp:    Temp:    SpO2:      General appearance: alert, appears stated age, and cooperative  HEENT: Neck supple with midline trachea  Neck: supple, symmetrical, trachea midline  Heart: regular rate and rhythm  Abdomen:  soft, NT, ND  Extremities:  no edema     Medications: Reviewed    Labs:  CBC:   Recent Labs     11/10/23  0612 11/11/23 0657 11/12/23  0518   WBC 3.7* 4.4 4.3   HGB 11.2* 11.8* 11.5*   HCT 34.3* 36.2 35.3*   MCV 91.2 91.2 90.6    209 229     BMP:   Recent Labs     11/10/23  0612 11/10/23  1451 11/11/23 0657 11/12/23  0518     --  140 142   K 3.6 3.5 3.2* 4.2     --  103 104   CO2 29  --  29 31   BUN 4*  --  6* 8   CREATININE <0.5*  --  <0.5* <0.5*       Impression:80year old female with history of depression, DVT, PBC, lumbar compression fracture admitted with diarrhea     Recommendation:  Continue supportive care  Stool tests negative  Encourage nutrition  PT/OT  Continue ursodiol  Flex sigmoidoscopy tomorrow as planned      Raffaele Toscano MD, MD  11:15 AM 11/12/2023

## 2023-11-12 NOTE — PROGRESS NOTES
Occupational Therapy  Facility/Department: [facility] C3 TELE/MED SURG/ONC  Daily Treatment Note  NAME: Zena Barth  : 1935  MRN: [id number]    Date of Service: 2023    Discharge Recommendations:  Subacute/Skilled Nursing Facility  OT Equipment Recommendations  Equipment Needed: No  Other: defer    Therapy discharge recommendations take into account each patient's current medical complexities and are subject to input/oversight from the patient's healthcare team.     Barriers to Home Discharge:   [] Steps to access home entry or bed/bath:   [x] Unable to transfer, ambulate, or propel wheelchair household distances without assist   [] Limited available assist at home upon discharge    [] Patient or family requests d/c to post-acute facility    [] Poor cognition/safety awareness for d/c to home alone    [] Unable to maintain ordered weight bearing status    [] Patient with salient signs of long-standing immobility   [x] Decreased independence with ADLs   [x] Increased risk for falls   [] Other:    If pt is unable to be seen after this session, please let this note serve as discharge summary. Please see case management note for discharge disposition. Thank you. AM-PAC score  -[facility] Inpatient Daily Activity Raw Score: 19 (23)  AM-PAC Inpatient ADL T-Scale Score : 40.22 (23)  ADL Inpatient CMS 0-100% Score: 42.8 (23)  ADL Inpatient CMS G-Code Modifier : CK (23)     Patient Diagnosis(es): The primary encounter diagnosis was Diarrhea, unspecified type. Diagnoses of Failure to thrive in adult, Goals of care, counseling/discussion, and Hypokalemia were also pertinent to this visit. Assessment    Assessment: Pt tolerated treatment well. Pt requires inc time to complete ADLs and fxl ambulation. Pt performing BUE ex to inc strength for fxl transfers and ADLs. Pt will continue to benefit from skilled OT in acute care. Cont OT POC.    Activity Tolerance: Patient

## 2023-11-13 ENCOUNTER — ANESTHESIA EVENT (OUTPATIENT)
Dept: ENDOSCOPY | Age: 88
DRG: 641 | End: 2023-11-13
Payer: MEDICARE

## 2023-11-13 ENCOUNTER — ANESTHESIA (OUTPATIENT)
Dept: ENDOSCOPY | Age: 88
DRG: 641 | End: 2023-11-13
Payer: MEDICARE

## 2023-11-13 LAB
ANION GAP SERPL CALCULATED.3IONS-SCNC: 8 MMOL/L (ref 3–16)
BUN SERPL-MCNC: 8 MG/DL (ref 7–20)
CALCIUM SERPL-MCNC: 8.7 MG/DL (ref 8.3–10.6)
CHLORIDE SERPL-SCNC: 101 MMOL/L (ref 99–110)
CO2 SERPL-SCNC: 29 MMOL/L (ref 21–32)
CREAT SERPL-MCNC: <0.5 MG/DL (ref 0.6–1.2)
DEPRECATED RDW RBC AUTO: 17.8 % (ref 12.4–15.4)
GFR SERPLBLD CREATININE-BSD FMLA CKD-EPI: >60 ML/MIN/{1.73_M2}
GLUCOSE BLD-MCNC: 84 MG/DL (ref 70–99)
GLUCOSE SERPL-MCNC: 88 MG/DL (ref 70–99)
HCT VFR BLD AUTO: 34.9 % (ref 36–48)
HGB BLD-MCNC: 11.6 G/DL (ref 12–16)
MAGNESIUM SERPL-MCNC: 1.8 MG/DL (ref 1.8–2.4)
MCH RBC QN AUTO: 30 PG (ref 26–34)
MCHC RBC AUTO-ENTMCNC: 33.2 G/DL (ref 31–36)
MCV RBC AUTO: 90.4 FL (ref 80–100)
PERFORMED ON: NORMAL
PLATELET # BLD AUTO: 234 K/UL (ref 135–450)
PMV BLD AUTO: 7.6 FL (ref 5–10.5)
POTASSIUM SERPL-SCNC: 3.2 MMOL/L (ref 3.5–5.1)
RBC # BLD AUTO: 3.86 M/UL (ref 4–5.2)
SODIUM SERPL-SCNC: 138 MMOL/L (ref 136–145)
WBC # BLD AUTO: 4.3 K/UL (ref 4–11)

## 2023-11-13 PROCEDURE — 2580000003 HC RX 258: Performed by: NURSE ANESTHETIST, CERTIFIED REGISTERED

## 2023-11-13 PROCEDURE — 7100000011 HC PHASE II RECOVERY - ADDTL 15 MIN: Performed by: INTERNAL MEDICINE

## 2023-11-13 PROCEDURE — 85027 COMPLETE CBC AUTOMATED: CPT

## 2023-11-13 PROCEDURE — 2500000003 HC RX 250 WO HCPCS: Performed by: NURSE ANESTHETIST, CERTIFIED REGISTERED

## 2023-11-13 PROCEDURE — 3700000000 HC ANESTHESIA ATTENDED CARE: Performed by: INTERNAL MEDICINE

## 2023-11-13 PROCEDURE — 88313 SPECIAL STAINS GROUP 2: CPT

## 2023-11-13 PROCEDURE — 3609008300 HC SIGMOIDOSCOPY W/BIOPSY SINGLE/MULTIPLE: Performed by: INTERNAL MEDICINE

## 2023-11-13 PROCEDURE — 6360000002 HC RX W HCPCS: Performed by: NURSE ANESTHETIST, CERTIFIED REGISTERED

## 2023-11-13 PROCEDURE — 97110 THERAPEUTIC EXERCISES: CPT

## 2023-11-13 PROCEDURE — 2580000003 HC RX 258: Performed by: ANESTHESIOLOGY

## 2023-11-13 PROCEDURE — 6370000000 HC RX 637 (ALT 250 FOR IP)

## 2023-11-13 PROCEDURE — 2580000003 HC RX 258

## 2023-11-13 PROCEDURE — 2709999900 HC NON-CHARGEABLE SUPPLY: Performed by: INTERNAL MEDICINE

## 2023-11-13 PROCEDURE — 7100000010 HC PHASE II RECOVERY - FIRST 15 MIN: Performed by: INTERNAL MEDICINE

## 2023-11-13 PROCEDURE — 36415 COLL VENOUS BLD VENIPUNCTURE: CPT

## 2023-11-13 PROCEDURE — 80048 BASIC METABOLIC PNL TOTAL CA: CPT

## 2023-11-13 PROCEDURE — 1200000000 HC SEMI PRIVATE

## 2023-11-13 PROCEDURE — 0DBN8ZX EXCISION OF SIGMOID COLON, VIA NATURAL OR ARTIFICIAL OPENING ENDOSCOPIC, DIAGNOSTIC: ICD-10-PCS | Performed by: INTERNAL MEDICINE

## 2023-11-13 PROCEDURE — 83735 ASSAY OF MAGNESIUM: CPT

## 2023-11-13 PROCEDURE — 88305 TISSUE EXAM BY PATHOLOGIST: CPT

## 2023-11-13 RX ORDER — ONDANSETRON 2 MG/ML
4 INJECTION INTRAMUSCULAR; INTRAVENOUS
Status: CANCELLED | OUTPATIENT
Start: 2023-11-13

## 2023-11-13 RX ORDER — DIPHENHYDRAMINE HYDROCHLORIDE 50 MG/ML
12.5 INJECTION INTRAMUSCULAR; INTRAVENOUS
Status: CANCELLED | OUTPATIENT
Start: 2023-11-13 | End: 2023-11-14

## 2023-11-13 RX ORDER — POTASSIUM CHLORIDE 20 MEQ/1
40 TABLET, EXTENDED RELEASE ORAL ONCE
Status: COMPLETED | OUTPATIENT
Start: 2023-11-13 | End: 2023-11-13

## 2023-11-13 RX ORDER — MEPERIDINE HYDROCHLORIDE 50 MG/ML
12.5 INJECTION INTRAMUSCULAR; INTRAVENOUS; SUBCUTANEOUS EVERY 5 MIN PRN
Status: CANCELLED | OUTPATIENT
Start: 2023-11-13

## 2023-11-13 RX ORDER — PROPOFOL 10 MG/ML
INJECTION, EMULSION INTRAVENOUS PRN
Status: DISCONTINUED | OUTPATIENT
Start: 2023-11-13 | End: 2023-11-13 | Stop reason: SDUPTHER

## 2023-11-13 RX ORDER — SODIUM CHLORIDE, SODIUM LACTATE, POTASSIUM CHLORIDE, CALCIUM CHLORIDE 600; 310; 30; 20 MG/100ML; MG/100ML; MG/100ML; MG/100ML
INJECTION, SOLUTION INTRAVENOUS CONTINUOUS PRN
Status: DISCONTINUED | OUTPATIENT
Start: 2023-11-13 | End: 2023-11-13 | Stop reason: SDUPTHER

## 2023-11-13 RX ORDER — LOPERAMIDE HYDROCHLORIDE 2 MG/1
2 CAPSULE ORAL EVERY 12 HOURS PRN
Status: DISCONTINUED | OUTPATIENT
Start: 2023-11-13 | End: 2023-11-14 | Stop reason: HOSPADM

## 2023-11-13 RX ORDER — SODIUM CHLORIDE 9 MG/ML
INJECTION, SOLUTION INTRAVENOUS PRN
Status: CANCELLED | OUTPATIENT
Start: 2023-11-13

## 2023-11-13 RX ORDER — SODIUM CHLORIDE, SODIUM LACTATE, POTASSIUM CHLORIDE, CALCIUM CHLORIDE 600; 310; 30; 20 MG/100ML; MG/100ML; MG/100ML; MG/100ML
INJECTION, SOLUTION INTRAVENOUS CONTINUOUS
Status: DISCONTINUED | OUTPATIENT
Start: 2023-11-13 | End: 2023-11-14

## 2023-11-13 RX ORDER — SODIUM CHLORIDE 0.9 % (FLUSH) 0.9 %
5-40 SYRINGE (ML) INJECTION PRN
Status: CANCELLED | OUTPATIENT
Start: 2023-11-13

## 2023-11-13 RX ORDER — OXYCODONE HYDROCHLORIDE 5 MG/1
5 TABLET ORAL PRN
Status: CANCELLED | OUTPATIENT
Start: 2023-11-13 | End: 2023-11-13

## 2023-11-13 RX ORDER — LIDOCAINE HYDROCHLORIDE 20 MG/ML
INJECTION, SOLUTION INFILTRATION; PERINEURAL PRN
Status: DISCONTINUED | OUTPATIENT
Start: 2023-11-13 | End: 2023-11-13 | Stop reason: SDUPTHER

## 2023-11-13 RX ORDER — LABETALOL HYDROCHLORIDE 5 MG/ML
5 INJECTION, SOLUTION INTRAVENOUS EVERY 10 MIN PRN
Status: CANCELLED | OUTPATIENT
Start: 2023-11-13

## 2023-11-13 RX ORDER — SODIUM CHLORIDE 0.9 % (FLUSH) 0.9 %
5-40 SYRINGE (ML) INJECTION EVERY 12 HOURS SCHEDULED
Status: CANCELLED | OUTPATIENT
Start: 2023-11-13

## 2023-11-13 RX ORDER — OXYCODONE HYDROCHLORIDE 5 MG/1
10 TABLET ORAL PRN
Status: CANCELLED | OUTPATIENT
Start: 2023-11-13 | End: 2023-11-13

## 2023-11-13 RX ORDER — SODIUM CHLORIDE, SODIUM LACTATE, POTASSIUM CHLORIDE, CALCIUM CHLORIDE 600; 310; 30; 20 MG/100ML; MG/100ML; MG/100ML; MG/100ML
INJECTION, SOLUTION INTRAVENOUS CONTINUOUS
Status: DISCONTINUED | OUTPATIENT
Start: 2023-11-13 | End: 2023-11-13

## 2023-11-13 RX ADMIN — APIXABAN 5 MG: 5 TABLET, FILM COATED ORAL at 20:27

## 2023-11-13 RX ADMIN — SODIUM CHLORIDE, SODIUM LACTATE, POTASSIUM CHLORIDE, AND CALCIUM CHLORIDE: .6; .31; .03; .02 INJECTION, SOLUTION INTRAVENOUS at 14:24

## 2023-11-13 RX ADMIN — PROPOFOL 60 MG: 10 INJECTION, EMULSION INTRAVENOUS at 14:21

## 2023-11-13 RX ADMIN — Medication 3 MG: at 20:26

## 2023-11-13 RX ADMIN — SODIUM CHLORIDE, PRESERVATIVE FREE 10 ML: 5 INJECTION INTRAVENOUS at 09:29

## 2023-11-13 RX ADMIN — LIDOCAINE HYDROCHLORIDE 60 MG: 20 INJECTION, SOLUTION INFILTRATION; PERINEURAL at 14:21

## 2023-11-13 RX ADMIN — ACETAMINOPHEN 650 MG: 325 TABLET ORAL at 20:27

## 2023-11-13 RX ADMIN — POTASSIUM CHLORIDE 40 MEQ: 1500 TABLET, EXTENDED RELEASE ORAL at 09:31

## 2023-11-13 RX ADMIN — ACETAMINOPHEN 650 MG: 325 TABLET ORAL at 04:19

## 2023-11-13 RX ADMIN — SODIUM CHLORIDE, SODIUM LACTATE, POTASSIUM CHLORIDE, AND CALCIUM CHLORIDE: .6; .31; .03; .02 INJECTION, SOLUTION INTRAVENOUS at 14:19

## 2023-11-13 RX ADMIN — CITALOPRAM HYDROBROMIDE 20 MG: 20 TABLET ORAL at 20:27

## 2023-11-13 RX ADMIN — SODIUM CHLORIDE, PRESERVATIVE FREE 10 ML: 5 INJECTION INTRAVENOUS at 20:33

## 2023-11-13 ASSESSMENT — PAIN - FUNCTIONAL ASSESSMENT: PAIN_FUNCTIONAL_ASSESSMENT: ACTIVITIES ARE NOT PREVENTED

## 2023-11-13 ASSESSMENT — PAIN DESCRIPTION - DESCRIPTORS: DESCRIPTORS: ACHING

## 2023-11-13 ASSESSMENT — PAIN DESCRIPTION - LOCATION: LOCATION: BACK

## 2023-11-13 ASSESSMENT — PAIN SCALES - GENERAL
PAINLEVEL_OUTOF10: 3
PAINLEVEL_OUTOF10: 0
PAINLEVEL_OUTOF10: 0

## 2023-11-13 ASSESSMENT — PAIN DESCRIPTION - ORIENTATION: ORIENTATION: LOWER

## 2023-11-13 NOTE — PROGRESS NOTES
Physical Therapy  Facility/Department: [facility] C3 TELE/MED SURG/ONC  Daily Treatment Note  NAME: Esvin Da Silva  : 1935  MRN: [id number]    Date of Service: 2023    Discharge Recommendations:  Subacute/Skilled Nursing Facility   PT Equipment Recommendations  Equipment Needed: No  Other: Defer      Patient Diagnosis(es): The primary encounter diagnosis was Diarrhea, unspecified type. Diagnoses of Failure to thrive in adult, Goals of care, counseling/discussion, and Hypokalemia were also pertinent to this visit. Assessment   Assessment: Pt continues to perform well with therapy, ambulates up to 250 ft with RW and SBA with no LOB and grossly supv for bed mobility and transfers. Pt required min assist to don TLSO due to straps being placed incorrectly, given educaiton on tips to make donning/doffing easier. Pt also performs seated therex well this date. Pt would continue to benefit from skilled therapy in acute setting to progress activity tolerance and independence prior to d/c. Continue to recommend SNF at d/c. Activity Tolerance: Patient tolerated treatment well  Equipment Needed: No  Other: Defer     Plan    Physical Therapy Plan  General Plan: 3-5 times per week  Current Treatment Recommendations: Strengthening;ROM;Balance training;Gait training;Functional mobility training;Home exercise program;Therapeutic activities; Safety education & training;Transfer training; Endurance training;Patient/Caregiver education & training     Restrictions  Restrictions/Precautions  Restrictions/Precautions: Up as Tolerated, General Precautions  Required Braces or Orthoses?: Yes (wears TLSO when OOB, has had it prior to hospital entry)  Required Braces or Orthoses  Spinal: Thoracic Lumbar Sacral Orthotics  Position Activity Restriction  Other position/activity restrictions: IV,     Subjective    Subjective  Subjective: pt found in bed, agreeable to therapy  Pain: reports no pain at rest  Orientation  Overall Orientation Status: Within Functional Limits  Orientation Level: Oriented to person;Oriented to place;Oriented to time;Oriented to situation;Oriented X4  Cognition  Overall Cognitive Status: WFL     Objective   Vitals  Pulse: 73  Heart Rate Source: Monitor  BP: 107/66  BP Location: Right upper arm  BP Method: Automatic  Patient Position: Semi fowlers  MAP (Calculated): 80  SpO2: 93 %  O2 Device: None (Room air)  Bed Mobility Training  Bed Mobility Training: Yes  Overall Level of Assistance: Supervision  Interventions: Verbal cues  Rolling: Supervision  Supine to Sit: Supervision  Sit to Supine: Supervision  Scooting: Supervision (EOB)  Balance  Sitting: Intact  Standing: With support (RW)  Standing - Static: Good  Standing - Dynamic: Good;Occasional  Transfer Training  Transfer Training: Yes  Overall Level of Assistance: Supervision (up to RW)  Interventions: Safety awareness training;Verbal cues; Weight shifting training/pressure relief  Sit to Stand: Supervision  Stand to Sit: Supervision  Gait Training  Gait Training: Yes  Gait  Overall Level of Assistance: Stand-by assistance; Additional time  Distance (ft): 250 Feet  Assistive Device: Walker, rolling;Gait belt  Interventions: Safety awareness training;Verbal cues; Tactile cues;Manual cues  Base of Support: Narrowed (midline crossing at times)  Speed/Sarah: Pace decreased (< 100 feet/min)  Step Length: Right shortened  Swing Pattern: Left asymmetrical  Stance: Left decreased  Gait Abnormalities: Decreased step clearance;Trunk sway increased; Steppage gait     PT Exercises  Exercise Treatment: seated BLE therex: AP, LAQ, marches, hip abd/add x10 each     Safety Devices  Type of Devices: Call light within reach;Gait belt;Nurse notified; Left in bed;Bed alarm in place; All fall risk precautions in place  Restraints  Restraints Initially in Place: No     [facility] 6 Clicks Inpatient Mobility:  AM-PAC Basic Mobility - Inpatient   How much help is needed turning from your back to your

## 2023-11-13 NOTE — PROGRESS NOTES
Pt is alert and oriented. VSS. RA. Pt denies pain and discomfort at this time. Lido patch placed on pt back. Shift assessment completed and documented. Call light within reach. Bed side table within reach. Wheels locked. Bed in lowest position. Bed check in place. Pt instructed to call out for assistance. Pt expressesed understanding & calls out appropritately. All care per orders.   Electronically signed by Manoj Heard RN on 11/13/2023 at 11:32 AM

## 2023-11-13 NOTE — PROGRESS NOTES
Phone call placed to Simone Old pt Grandson and primary emergency contact to update family on when pt flex sig would be today. Pt is scheduled for 2:30pm today.

## 2023-11-13 NOTE — PROGRESS NOTES
Report received from Pending sale to [facility],  in transport.

## 2023-11-13 NOTE — OP NOTE
Flex sigmoidoscopy Note    Patient:   Tacho Meza    YOB: 1935    Facility:   [facility] [Inpatient]  Referring/PCP: DEBBIE Marques CNP  Procedure:   Flex sigmoidoscopy  Date:     11/13/2023  Endoscopist:  Sergio Godfrey DO    Preoperative Diagnosis:  Diarrhea. Postoperative Diagnosis:  same    Anesthesia: MAC    Estimated blood loss: < 5 ml    Complications:  None    Description of Procedure:  Informed consent was obtained from the patient after explanation of the procedure including indications, description of the procedure,  benefits and possible risks and complications of the procedure, and alternatives. Questions were answered. The patient's history was reviewed and a directed physical examination was performed prior to the procedure. Patient was monitored throughout the procedure with pulse oximetry and periodic assessment of vital signs. Patient was sedated as noted above. The Nursing staff and I performed a time out. With the patient initially in the left lateral decubitus position, a digital rectal examination was performed and revealed negative without mass, lesions or tenderness. The Olympus video colonoscope was placed in the patient's rectum and advanced without difficulty  to the sigmoid colon. Photographs were taken. The prep was poor. Examination of the mucosa was performed during both introduction and withdrawal of the colonoscope. Retroflexed view of the rectum was performed. Findings:     1. Large amount of liquid stool was seen in the colon  2.  The visualized colon mucosa was normal.  Biopsies were obtained     Recommendations:    Await pathology results    Sergio Godfrey DO

## 2023-11-13 NOTE — PROGRESS NOTES
V2.0    [facility] [place]      Name:  Devan Taylor /Age/Sex: 1935  (80 y.o. female)   MRN & CSN:  [id number] & [id number] Encounter Date/Time: 2023 3:07 PM EST   Location:  [id number]- PCP: DEBBIE Fernandez CNP     Attending:Jessica Conteh MD       Hospital Day: 7    Assessment and Recommendations   Devan Taylor is a 80 y.o. female with pmh of depression, history of acute DVT on Eliquis 5 mg twice daily, history compression fracture of L1, tremors on propanolol  who presents with Hypokalemia      Plan:     Hypokalemia  Likely secondary to diarrhea   Admission potassium 2.7. Potassium this morning 3.2  - Replaced 40mg oral today  -recheck tomorrow     Diarrhea  Unknown etiology-likely microscopic colitis   Patient's diarrhea returned to baseline  CT-IV contrast showed no acute abnormalities   Cont Ursodiol  (+)fecal leukocytes   (-)GI pathogen PCR  -G. I. following, plan for Flex sig with biopsies on .   -DC tomorrow to SNF    Hx of acute DVT left lower extremity  10/6/2023 lower extremity DVT study showed acute DVT  Flex sig for biopsies planned for 2023  restart on Eliquis 5mg BID 2023 after surgery    Depression  Continue Celexa 10 mg BID nightly.      Tremors  Patient takes propanolol, currently held due to low blood pressure      Diet Diet NPO   DVT Prophylaxis [] Lovenox, []  Heparin, [] SCDs, [] Ambulation,  [x] Eliquis, [] Xarelto  [] Coumadin   Code Status Limited   Disposition From: SNF  Expected Disposition: SNF  Estimated Date of Discharge: 2-3days   Patient requires continued admission due to Medical Management   Surrogate Decision Maker/ MANJU Fuchs   Personally reviewed Lb Studies and Imaging       Subjective:     Chief Complaint: Hypokalemia    Devan Taylor is a 80 y.o. female with pmh of pmh of depression, history of acute DVT on Eliquis 5 mg twice daily, tremors on propanolol  who presents with hypokalemia     Patient presented with Medications:    bisacodyl  20 mg Oral Once    lidocaine  1 patch TransDERmal Daily    acetaminophen  650 mg Oral Q6H    [Held by provider] apixaban  5 mg Oral BID    melatonin  3 mg Oral Nightly    sodium chloride flush  5-40 mL IntraVENous 2 times per day    citalopram  20 mg Oral QHS      Infusions:    sodium chloride       PRN Meds: oxyCODONE, 5 mg, Q6H PRN  sodium chloride flush, 5-40 mL, PRN  sodium chloride, , PRN  ondansetron, 4 mg, Q8H PRN   Or  ondansetron, 4 mg, Q6H PRN  polyethylene glycol, 17 g, Daily PRN  acetaminophen, 650 mg, Q6H PRN   Or  acetaminophen, 650 mg, Q6H PRN        Labs and Imaging   CT ABDOMEN PELVIS W IV CONTRAST Additional Contrast? Oral    Result Date: 11/7/2023  EXAMINATION: CT OF THE ABDOMEN AND PELVIS WITH CONTRAST 11/7/2023 6:46 pm TECHNIQUE: CT of the abdomen and pelvis was performed with the administration of intravenous contrast. Multiplanar reformatted images are provided for review. Automated exposure control, iterative reconstruction, and/or weight based adjustment of the mA/kV was utilized to reduce the radiation dose to as low as reasonably achievable. COMPARISON: 06/24/2021 HISTORY: ORDERING SYSTEM PROVIDED HISTORY: diarrhea x 2 months, weight loss TECHNOLOGIST PROVIDED HISTORY: Reason for exam:->diarrhea x 2 months, weight loss Additional Contrast?->Oral Reason for Exam: diarrhea, abd pain FINDINGS: Lower Chest: Lung bases demonstrate no active disease. Organs: Liver appears unremarkable. No acute gallbladder or biliary disease. Pancreas, spleen, adrenals, aorta, and IVC appear stable. Right nephrolithiasis with a 3 mm stone in lower pole calyx of the right kidney. No obstructive uropathy. GI/Bowel: Distended, fluid-filled large bowel loops without evidence of bowel wall thickening. Findings are consistent with diarrhea. Small bowel appears unremarkable. No evidence of colonic masses. Pelvis: Urinary bladder appears unremarkable.   Reproductive organs appear

## 2023-11-13 NOTE — CARE COORDINATION
Chart reviewed day 6. Care managed by GI and IM. Palliative care following. Plan for flex sig today. Diarrhea improved. Plan to return skilled at [facility] head when medically ready. From [place] there at baseline. Madison Agee RN    Spoke with patient and Bibi Ordonez, confirmed plan to return skilled at [facility] head when medically ready.

## 2023-11-13 NOTE — PLAN OF CARE
Problem: Safety - Adult  Goal: Free from fall injury  Outcome: Progressing  Flowsheets (Taken 11/13/2023 1130)  Free From Fall Injury:   Instruct family/caregiver on patient safety   Based on caregiver fall risk screen, instruct family/caregiver to ask for assistance with transferring infant if caregiver noted to have fall risk factors     Problem: Pain  Goal: Verbalizes/displays adequate comfort level or baseline comfort level  Outcome: Progressing  Flowsheets (Taken 11/13/2023 1130)  Verbalizes/displays adequate comfort level or baseline comfort level:   Encourage patient to monitor pain and request assistance   Assess pain using appropriate pain scale   Administer analgesics based on type and severity of pain and evaluate response   Implement non-pharmacological measures as appropriate and evaluate response

## 2023-11-14 VITALS
OXYGEN SATURATION: 94 % | HEART RATE: 76 BPM | WEIGHT: 109 LBS | BODY MASS INDEX: 18.61 KG/M2 | DIASTOLIC BLOOD PRESSURE: 64 MMHG | HEIGHT: 64 IN | SYSTOLIC BLOOD PRESSURE: 103 MMHG | RESPIRATION RATE: 16 BRPM | TEMPERATURE: 97.9 F

## 2023-11-14 LAB
ANION GAP SERPL CALCULATED.3IONS-SCNC: 11 MMOL/L (ref 3–16)
BUN SERPL-MCNC: 8 MG/DL (ref 7–20)
CALCIUM SERPL-MCNC: 8.8 MG/DL (ref 8.3–10.6)
CHLORIDE SERPL-SCNC: 100 MMOL/L (ref 99–110)
CO2 SERPL-SCNC: 26 MMOL/L (ref 21–32)
CREAT SERPL-MCNC: <0.5 MG/DL (ref 0.6–1.2)
DEPRECATED RDW RBC AUTO: 17.2 % (ref 12.4–15.4)
GFR SERPLBLD CREATININE-BSD FMLA CKD-EPI: >60 ML/MIN/{1.73_M2}
GLUCOSE SERPL-MCNC: 86 MG/DL (ref 70–99)
HCT VFR BLD AUTO: 36.3 % (ref 36–48)
HGB BLD-MCNC: 12 G/DL (ref 12–16)
MCH RBC QN AUTO: 30.4 PG (ref 26–34)
MCHC RBC AUTO-ENTMCNC: 33 G/DL (ref 31–36)
MCV RBC AUTO: 92 FL (ref 80–100)
PLATELET # BLD AUTO: 223 K/UL (ref 135–450)
PMV BLD AUTO: 7.7 FL (ref 5–10.5)
POTASSIUM SERPL-SCNC: 3.8 MMOL/L (ref 3.5–5.1)
RBC # BLD AUTO: 3.95 M/UL (ref 4–5.2)
SODIUM SERPL-SCNC: 137 MMOL/L (ref 136–145)
WBC # BLD AUTO: 5 K/UL (ref 4–11)

## 2023-11-14 PROCEDURE — 2580000003 HC RX 258

## 2023-11-14 PROCEDURE — 85027 COMPLETE CBC AUTOMATED: CPT

## 2023-11-14 PROCEDURE — 36415 COLL VENOUS BLD VENIPUNCTURE: CPT

## 2023-11-14 PROCEDURE — 97530 THERAPEUTIC ACTIVITIES: CPT

## 2023-11-14 PROCEDURE — 6370000000 HC RX 637 (ALT 250 FOR IP)

## 2023-11-14 PROCEDURE — 97110 THERAPEUTIC EXERCISES: CPT

## 2023-11-14 PROCEDURE — 80048 BASIC METABOLIC PNL TOTAL CA: CPT

## 2023-11-14 RX ORDER — ACETAMINOPHEN 325 MG/1
650 TABLET ORAL EVERY 6 HOURS
Qty: 120 TABLET | Refills: 3 | DISCHARGE
Start: 2023-11-14

## 2023-11-14 RX ORDER — CITALOPRAM 20 MG/1
20 TABLET ORAL
Qty: 30 TABLET | Refills: 3 | DISCHARGE
Start: 2023-11-14

## 2023-11-14 RX ORDER — LOPERAMIDE HYDROCHLORIDE 2 MG/1
2 CAPSULE ORAL EVERY 12 HOURS PRN
DISCHARGE
Start: 2023-11-14 | End: 2023-11-24

## 2023-11-14 RX ORDER — POTASSIUM CHLORIDE 20 MEQ/1
20 TABLET, EXTENDED RELEASE ORAL DAILY
Qty: 30 TABLET | Refills: 0 | DISCHARGE
Start: 2023-11-14

## 2023-11-14 RX ADMIN — ACETAMINOPHEN 650 MG: 325 TABLET ORAL at 14:25

## 2023-11-14 RX ADMIN — ACETAMINOPHEN 650 MG: 325 TABLET ORAL at 04:45

## 2023-11-14 RX ADMIN — APIXABAN 5 MG: 5 TABLET, FILM COATED ORAL at 09:07

## 2023-11-14 RX ADMIN — SODIUM CHLORIDE, PRESERVATIVE FREE 10 ML: 5 INJECTION INTRAVENOUS at 09:09

## 2023-11-14 RX ADMIN — LOPERAMIDE HYDROCHLORIDE 2 MG: 2 CAPSULE ORAL at 09:07

## 2023-11-14 ASSESSMENT — PAIN SCALES - GENERAL
PAINLEVEL_OUTOF10: 0
PAINLEVEL_OUTOF10: 3

## 2023-11-14 ASSESSMENT — PAIN - FUNCTIONAL ASSESSMENT: PAIN_FUNCTIONAL_ASSESSMENT: ACTIVITIES ARE NOT PREVENTED

## 2023-11-14 ASSESSMENT — PAIN DESCRIPTION - LOCATION: LOCATION: BACK

## 2023-11-14 NOTE — PROGRESS NOTES
Impression:  80year old female with past medical history of acute DVT, depression, PBC on ursodiol and recent admission 10/13 for back pain in which she was found to have a closed lumbar compression fracture. Presenting with diarrhea. Diarrhea x 2 months. Not specifically post prandial or in the evening. C. Diff testing and stool culture negative x 2. Fecal WBCs positive. Fecal rafael, fat and elastase are still in process. Celiac panel and TSH negative. CTap shows diarrheal state, no evidence of colitis. Suspect microscopic colitis. S/p flex sig with biopsies yesterday. 2. Hypokalemia. Improved. 3. Weight loss. 4. Hx PBC. No fibrosis on elastography 2021. LFTs normal.     Plan:  Plans are to discharge today. We will follow-up pathology. 2. Await the rest of stool studies- these results seem quite delayed. 3. She is currently not taking Ursodiol. Her LFTs normal. Will address at outpatient visit. Please do not hesitate to call with questions or concerns.       Electronically signed by: DEBBIE Castillo 11/14/2023 1:04 PM     (Office) [phone]  (Fax) [phone]  Available via perfect serve

## 2023-11-14 NOTE — DISCHARGE INSTRUCTIONS
Please follow up with PCP within 1-2 weeks  Please get BMP lab draw on 11/16/23 and weekly there after   Gastroenterology will call with pathology report on biopsy

## 2023-11-14 NOTE — PROGRESS NOTES
Occupational Therapy  Facility/Department: [facility] C3 TELE/MED SURG/ONC  Daily Treatment Note  NAME: Mulu Robison  : 1935  MRN: [id number]    Date of Service: 2023    Discharge Recommendations:  Subacute/Skilled Nursing Facility  OT Equipment Recommendations  Equipment Needed: No  Other: defer    Therapy discharge recommendations take into account each patient's current medical complexities and are subject to input/oversight from the patient's healthcare team.     Barriers to Home Discharge:   [] Steps to access home entry or bed/bath:   [] Unable to transfer, ambulate, or propel wheelchair household distances without assist   [x] Limited available assist at home upon discharge    [x] Patient or family requests d/c to post-acute facility    [] Poor cognition/safety awareness for d/c to home alone    [] Unable to maintain ordered weight bearing status    [] Patient with salient signs of long-standing immobility   [x] Decreased independence with ADLs   [x] Increased risk for falls   [] Other:    If pt is unable to be seen after this session, please let this note serve as discharge summary. Please see case management note for discharge disposition. Thank you.     AM-PAC Daily Activity - Inpatient   How much help is needed for putting on and taking off regular lower body clothing?: A Little  How much help is needed for bathing (which includes washing, rinsing, drying)?: A Little  How much help is needed for toileting (which includes using toilet, bedpan, or urinal)?: A Little  How much help is needed for putting on and taking off regular upper body clothing?: A Little  How much help is needed for taking care of personal grooming?: A Little  How much help for eating meals?: None  AM-[facility] Inpatient Daily Activity Raw Score: 19  AM-PAC Inpatient ADL T-Scale Score : 40.22  ADL Inpatient CMS 0-100% Score: 42.8  ADL Inpatient CMS G-Code Modifier : CK      Patient Diagnosis(es): The primary encounter diagnosis was Specific Education: Pt educated on importance of OOB mobility, prevention of complications of bedrest, and general safety during hospitalization.  Pt verbalized understanding      Goals  Short Term Goals  Time Frame for Short Term Goals: 11/14, unless otherwise noted, ongoing 11/14  Short Term Goal 1: Pt will complete functional transfer/toilet transfer with SPV  Short Term Goal 2: Pt will complete toileting with SVP  Short Term Goal 3: Pt will complete LB dressing with SVP  Short Term Goal 4: Pt will complete UE exer program 15x reps each by 11/12: GOAL MET 11/09       Therapy Time   Individual Concurrent Group Co-treatment   Time In 1109         Time Out 1135         Minutes 26         Timed Code Treatment Minutes: 1635 Juan Machuca, OT

## 2023-11-14 NOTE — PLAN OF CARE
Problem: Safety - Adult  Goal: Free from fall injury  Outcome: Progressing  Flowsheets (Taken 11/14/2023 1438)  Free From Fall Injury:   Instruct family/caregiver on patient safety   Based on caregiver fall risk screen, instruct family/caregiver to ask for assistance with transferring infant if caregiver noted to have fall risk factors     Problem: Pain  Goal: Verbalizes/displays adequate comfort level or baseline comfort level  Outcome: Progressing  Flowsheets  Taken 11/14/2023 1438 by Mervat Gibbons RN  Verbalizes/displays adequate comfort level or baseline comfort level:   Encourage patient to monitor pain and request assistance   Assess pain using appropriate pain scale   Administer analgesics based on type and severity of pain and evaluate response   Implement non-pharmacological measures as appropriate and evaluate response  Taken 11/14/2023 0445 by Lianet Mejía RN  Verbalizes/displays adequate comfort level or baseline comfort level: Encourage patient to monitor pain and request assistance

## 2023-11-14 NOTE — CARE COORDINATION
Chart reviewed day 7. Anticipate d/c today back to skilled at [facility] [address]. Tentative transport pending for 3pm . MD needs GI ok.  Willow Carlin RN

## 2023-11-14 NOTE — DISCHARGE SUMMARY
V2.0  Discharge Summary    Name:  Claudette Meza /Age/Sex: 1935 (65 y.o. female)   Admit Date: 2023  Discharge Date: 23    MRN & CSN:  [id number] & [id number] Encounter Date and Time 23 4:15 PM EST    Attending:  No att. providers found Discharging Provider: Roxi Veliz DO       Hospital Course:     Brief HPI: Claudette Meza is a 80 y.o. female who presented with hypokalemia potassium level of 2.7    Brief Problem Based Course:   Hypokalemia  Patient presented to the ED with abnormal potassium lab of 2.7. There was no abnormal EKG findings and patient was asymptomatic. Likely associated to patient's diarrhea. Patient has been having associated diarrhea minimum for watery diarrhea per day which likely contributing to her low potassium. Potassium was repleted via IV and oral potassium. During her hospital stay after initial stabilization of her potassium, she needed around 40 mg of potassium every other day. Patient will be discharged on 20 mg of potassium every day. Schedule lab draw on 2023. Recommend weekly lab draws to assess electrolyte every week. Diarrhea  Patient been dealing with diarrhea since early 2023. There is some associated lower abdominal pain with bowel movement. Patient was worked up for infectious etiology and GI was consulted. It was concluded that patient does not have infectious etiology of her diarrhea. GI performed a flexible sigmoidoscopy to take a biopsy to diagnose possible microscopic colitis. Patient was scribed Imodium every 12 for as needed diarrhea. Follow-up with gastroenterology outpatient for pathology report. This diarrhea likely contributing to her hypokalemia. History of acute DVT of left lower extremity  Patient had a DVT in 2023 on left lower extremity DVT study. Patient was prescribed Eliquis 5 mg twice daily and was continued during hospital admission.   Patient was bridged on Lovenox for 2 days prior to the last 72 hours.   UA:  Lab Results   Component Value Date/Time    NITRU Negative 11/13/2017 01:50 PM    COLORU Yellow 11/13/2017 01:50 PM    PHUR 7.5 11/13/2017 01:50 PM    CLARITYU Clear 11/13/2017 01:50 PM    SPECGRAV 1.010 11/13/2017 01:50 PM    LEUKOCYTESUR Negative 11/13/2017 01:50 PM    UROBILINOGEN 0.2 11/13/2017 01:50 PM    BILIRUBINUR Negative 11/13/2017 01:50 PM    BILIRUBINUR neg 03/09/2015 02:38 PM    BLOODU Negative 11/13/2017 01:50 PM    GLUCOSEU Negative 11/13/2017 01:50 PM    [place] 15 11/13/2017 01:50 PM     Urine Cultures: No results found for: \"LABURIN\"  Blood Cultures: No results found for: \"BC\"  No results found for: \"BLOODCULT2\"  Organism: No results found for: \"ORG\"    Time Spent Discharging patient 45 minutes    Electronically signed by Koki Varela DO on 11/14/2023 at 4:15 PM

## 2023-11-14 NOTE — DISCHARGE INSTR - COC
Continuity of Care Form    Patient Name: Esvin Da Silva   :  1935  MRN:  [id number]    Admit date:  2023  Discharge date:  ***    Code Status Order: Limited   Advance Directives:   Advance Care Flowsheet Documentation       Date/Time Healthcare Directive Type of Healthcare Directive Copy in [address] Agent's Name Healthcare Agent's Phone Number    [phone] Yes, patient has an advance directive for healthcare treatment Living will;Durable power of  for health care No, copy requested from family Healthcare power of  Eliseo Rosenberg [phone]            Admitting Physician:  Xin Vazquez MD  PCP: DEBBIE Oleary - CNP    Discharging Nurse: [facility] Unit/Room#: 0540/7280-89  Discharging Unit Phone Number: ***    Emergency Contact:   Extended Emergency Contact Information  Primary Emergency Contact: [address] [place] of [address] Phone: [phone]  Work Phone: [phone]  Mobile Phone: [phone]  Relation: [facility]  Secondary Emergency Contact: [address] Phone: [phone]  Relation: Child    Past Surgical History:  Past Surgical History:   Procedure Laterality Date    CATARACT REMOVAL      COLONOSCOPY      HIP SURGERY Left 2023    LEFT HIP OPEN REDUCTION INTERNAL FIXATION INTRAMEDULLARY NAILING performed by Dorisann Gosselin, DO at [address]       Immunization History:   Immunization History   Administered Date(s) Administered    COVID-19, PFIZER GRAY top, DO NOT Dilute, (age 15 y+), IM, 30 mcg/0.3 mL 2022    COVID-19, PFIZER PURPLE top, DILUTE for use, (age 15 y+), 30mcg/0.3mL 01/15/2021, 2021, 10/03/2021    DTaP 2010    Influenza 10/07/2006    Influenza Virus Vaccine 10/16/2003, 10/08/2005, 10/29/2007, 10/09/2008, 2009, 09/15/2010, 2011    Influenza, FLUAD, (age 72 y+), Adjuvanted, 0.5mL 2020, 2022, 10/12/2023

## 2023-11-14 NOTE — PROGRESS NOTES
Pt is alert and oriented. VSS. RA. Pt denies pain and discomfort at this time. Shift assessment completed and documented. Call light within reach. Bed side table within reach. Wheels locked. Bed in lowest position. Bed check in place. Pt instructed to call out for assistance. Pt expressesed understanding & calls out appropritately. All care per orders.  Electronically signed by Oliva Brady RN on 11/14/2023 at 3:02 PM

## 2023-11-14 NOTE — PLAN OF CARE
Problem: Safety - Adult  Goal: Free from fall injury  11/13/2023 2251 by Ketty Wu RN  Outcome: Progressing  Flowsheets (Taken 11/13/2023 2251)  Free From Fall Injury:   Instruct family/caregiver on patient safety   Based on caregiver fall risk screen, instruct family/caregiver to ask for assistance with transferring infant if caregiver noted to have fall risk factors  11/13/2023 1130 by Laura Wagner RN  Outcome: Progressing  Flowsheets (Taken 11/13/2023 1130)  Free From Fall Injury:   Instruct family/caregiver on patient safety   Based on caregiver fall risk screen, instruct family/caregiver to ask for assistance with transferring infant if caregiver noted to have fall risk factors     Problem: Skin/Tissue Integrity  Goal: Absence of new skin breakdown  Description: 1. Monitor for areas of redness and/or skin breakdown  2. Assess vascular access sites hourly  3. Every 4-6 hours minimum:  Change oxygen saturation probe site  4. Every 4-6 hours:  If on nasal continuous positive airway pressure, respiratory therapy assess nares and determine need for appliance change or resting period.   Outcome: Progressing     Problem: ABCDS Injury Assessment  Goal: Absence of physical injury  Outcome: Progressing  Flowsheets (Taken 11/13/2023 2251)  Absence of Physical Injury: Implement safety measures based on patient assessment     Problem: Pain  Goal: Verbalizes/displays adequate comfort level or baseline comfort level  11/13/2023 2251 by Ketty Wu RN  Outcome: Progressing  Flowsheets (Taken 11/13/2023 2251)  Verbalizes/displays adequate comfort level or baseline comfort level:   Encourage patient to monitor pain and request assistance   Assess pain using appropriate pain scale   Administer analgesics based on type and severity of pain and evaluate response  11/13/2023 1130 by Laura Wagner RN  Outcome: Progressing  Flowsheets (Taken 11/13/2023 1130)  Verbalizes/displays adequate comfort level or

## 2023-11-14 NOTE — CARE COORDINATION
CASE MANAGEMENT DISCHARGE SUMMARY      Discharge to: skilled at [facility] [address] completed: [facility] Exemption Notification (HENS) completed: na    IMM given: 11/13/23    New Durable Medical Equipment ordered/agency: none    Transportation:    Family/car:   Medical Transport explained to [employer]. Pt/family voice no agency preference. Agency used:[facility]   time:3pm   Ambulance form completed: Yes    Confirmed discharge plan with:     Patient: yes     Family:  yes Susann Boeck via phone     Facility/Agency, name:  [facility]   Phone number for report to facility: [phone]     RN, name: Rebekah    Note: Discharging nurse to complete CALEB, reconcile AVS, and place final copy with patient's discharge packet. RN to ensure that written prescriptions for  Level II medications are sent with patient to the facility as per protocol.   Margaret Everett RN

## 2023-12-13 ENCOUNTER — PATIENT MESSAGE (OUTPATIENT)
Dept: INTERNAL MEDICINE CLINIC | Age: 88
End: 2023-12-13

## 2023-12-14 ENCOUNTER — TELEPHONE (OUTPATIENT)
Dept: INTERNAL MEDICINE CLINIC | Age: 88
End: 2023-12-14

## 2023-12-14 RX ORDER — POTASSIUM CHLORIDE 20 MEQ/1
20 TABLET, EXTENDED RELEASE ORAL DAILY
Qty: 30 TABLET | Refills: 0 | Status: SHIPPED | OUTPATIENT
Start: 2023-12-14 | End: 2023-12-15 | Stop reason: SDUPTHER

## 2023-12-14 NOTE — TELEPHONE ENCOUNTER
Patient's granddaughter Waverly Cammie Kristen Jigna) is calling wants to know if she is supposed to continue Potassium 20 MEQ and that is what the hospital released her with. Released before Thanksgiving. The granddaughter manages her medicines.          Antonio Bonds Cell    [phone]

## 2023-12-14 NOTE — TELEPHONE ENCOUNTER
From: Ravin Nunez  To: Derik Power  Sent: 12/13/2023 8:41 PM EST  Subject: KCl     Hello! My grandma, Emir Dejesus, has an upcoming appointment on 12/20 with you. She had been in skilled care until 12/12 and while there she was unable to visit her PCP. Upon discharge from the hospital, she was prescribed KCl and will be running out tomorrow. Is there a way for her to get a one week supply Rx from you to hold her over until her appointment next week? Thanks!

## 2023-12-14 NOTE — TELEPHONE ENCOUNTER
Refill request for  i  medication.      Name of [facility]      Last visit -  10/12/2023     Pending visit -  12/20/2023    Last refill - 11/14/2023      Medication Contract signed -    Last Oarrs ran-          Additional Comments

## 2023-12-15 RX ORDER — POTASSIUM CHLORIDE 20 MEQ/1
20 TABLET, EXTENDED RELEASE ORAL DAILY
Qty: 90 TABLET | Refills: 1 | Status: SHIPPED | OUTPATIENT
Start: 2023-12-15

## 2023-12-15 NOTE — TELEPHONE ENCOUNTER
Called the granddaughter and she said that a new prescription of Potassium needs to be sent to the [employer] on [address] where she works at just for today. Prescription has been pended and sent to the PCP.

## 2023-12-21 ENCOUNTER — OFFICE VISIT (OUTPATIENT)
Dept: INTERNAL MEDICINE CLINIC | Age: 88
End: 2023-12-21
Payer: MEDICARE

## 2023-12-21 VITALS
HEART RATE: 84 BPM | SYSTOLIC BLOOD PRESSURE: 92 MMHG | OXYGEN SATURATION: 94 % | WEIGHT: 110 LBS | HEIGHT: 64 IN | BODY MASS INDEX: 18.78 KG/M2 | TEMPERATURE: 97.8 F | DIASTOLIC BLOOD PRESSURE: 62 MMHG

## 2023-12-21 DIAGNOSIS — M25.50 ARTHRALGIA, UNSPECIFIED JOINT: Primary | ICD-10-CM

## 2023-12-21 DIAGNOSIS — R19.7 DIARRHEA, UNSPECIFIED TYPE: ICD-10-CM

## 2023-12-21 PROCEDURE — G8420 CALC BMI NORM PARAMETERS: HCPCS | Performed by: NURSE PRACTITIONER

## 2023-12-21 PROCEDURE — G8484 FLU IMMUNIZE NO ADMIN: HCPCS | Performed by: NURSE PRACTITIONER

## 2023-12-21 PROCEDURE — 1123F ACP DISCUSS/DSCN MKR DOCD: CPT | Performed by: NURSE PRACTITIONER

## 2023-12-21 PROCEDURE — 1036F TOBACCO NON-USER: CPT | Performed by: NURSE PRACTITIONER

## 2023-12-21 PROCEDURE — G8427 DOCREV CUR MEDS BY ELIG CLIN: HCPCS | Performed by: NURSE PRACTITIONER

## 2023-12-21 PROCEDURE — 99213 OFFICE O/P EST LOW 20 MIN: CPT | Performed by: NURSE PRACTITIONER

## 2023-12-21 PROCEDURE — 1090F PRES/ABSN URINE INCON ASSESS: CPT | Performed by: NURSE PRACTITIONER

## 2023-12-21 RX ORDER — CHOLESTYRAMINE 4 G/9G
1 POWDER, FOR SUSPENSION ORAL DAILY
COMMUNITY

## 2023-12-21 RX ORDER — M-VIT,TX,IRON,MINS/CALC/FOLIC 27MG-0.4MG
1 TABLET ORAL DAILY
COMMUNITY

## 2023-12-21 RX ORDER — CALCIUM CARBONATE 500(1250)
500 TABLET ORAL DAILY
COMMUNITY

## 2023-12-29 DIAGNOSIS — R25.1 TREMOR: ICD-10-CM

## 2023-12-29 RX ORDER — PROPRANOLOL HYDROCHLORIDE 10 MG/1
TABLET ORAL
Qty: 180 TABLET | Refills: 1 | Status: SHIPPED | OUTPATIENT
Start: 2023-12-29

## 2023-12-29 NOTE — TELEPHONE ENCOUNTER
Refill request for  propranolol (INDERAL) 10 MG tablet medication. Name of Pharmacy- [facility]      Last visit - 12/21/23     Pending visit - 6/27/24    Last refill - 8/28/23      Medication Contract signed - PDMP Monitoring:    Last PDMP Debi Brand as Reviewed:  Review User Review Instant Review Result   LUKE HANNA 5/24/2021  7:37 AM Reviewed PDMP [1]     @[facility]@  Urine Drug Screenings (1 yr)    No resulted procedures found.        Medication Contract and Consent for Opioid Use Documents Filed       Patient Documents       Type of Document Status Date Received Received By Description    Medication Contract Received 5/21/2021  4:49 PM [facility] Medication Contract                    Last Horacio Emmanuel ran-         Additional Comments

## 2024-01-10 NOTE — TELEPHONE ENCOUNTER
Refill request for Cholestyramine  medication.     Name of Pharmacy- [facility]      Last visit - 12-     Pending visit - 6-    Last refill - N/A      Medication Contract signed - 5-  Last Oarrs ran- 5-        Additional Comments

## 2024-01-10 NOTE — TELEPHONE ENCOUNTER
----- Message from Marina Morales sent at 1/10/2024  1:51 PM EST -----  Regarding: Cholestyramine   Contact: [phone]  Marina Quintana is running out of Cholestyramine.  Can a refill order be sent to [facility]?

## 2024-01-11 RX ORDER — CHOLESTYRAMINE 4 G/9G
1 POWDER, FOR SUSPENSION ORAL DAILY
Qty: 90 PACKET | Refills: 3 | Status: SHIPPED | OUTPATIENT
Start: 2024-01-11

## 2024-02-16 ENCOUNTER — PATIENT MESSAGE (OUTPATIENT)
Dept: INTERNAL MEDICINE CLINIC | Age: 89
End: 2024-02-16

## 2024-02-16 NOTE — TELEPHONE ENCOUNTER
From: Marina Morales  To: Kulwant Cabrera  Sent: 2/16/2024 2:42 PM EST  Subject: Foot numbness    Hello Dr Cabrera,    My grandmother, Marina, has been complaining of what I understand to be numbness in her left heel. This would be the leg on which she had surgery due to a break from a fall.     She asked me to inquire if she should be seen or if this is expected from her surgery.     Thanks!

## 2024-02-23 ENCOUNTER — TELEPHONE (OUTPATIENT)
Dept: INTERNAL MEDICINE CLINIC | Age: 89
End: 2024-02-23

## 2024-02-23 NOTE — TELEPHONE ENCOUNTER
Patient's daughter Greer is calling and states still having some swelling in her left ankle? Patient's daughter stated that she told the patient she to keep her feet up. She isn't taking any water pills and the patient is in a care assisted living facility. Patient is in [facility].       Greer    [phone]

## 2024-02-26 NOTE — TELEPHONE ENCOUNTER
Left daughter, Greer, a message, please let us know if she wants VV or in office visit regarding ankles swelling.

## 2024-02-29 ENCOUNTER — TELEPHONE (OUTPATIENT)
Dept: INTERNAL MEDICINE CLINIC | Age: 89
End: 2024-02-29

## 2024-02-29 ENCOUNTER — OFFICE VISIT (OUTPATIENT)
Dept: INTERNAL MEDICINE CLINIC | Age: 89
End: 2024-02-29
Payer: MEDICARE

## 2024-02-29 VITALS
HEART RATE: 83 BPM | OXYGEN SATURATION: 96 % | DIASTOLIC BLOOD PRESSURE: 58 MMHG | SYSTOLIC BLOOD PRESSURE: 96 MMHG | WEIGHT: 112 LBS | TEMPERATURE: 97.5 F | BODY MASS INDEX: 19.22 KG/M2

## 2024-02-29 DIAGNOSIS — R09.89 PROLONGED CAPILLARY REFILL TIME: ICD-10-CM

## 2024-02-29 DIAGNOSIS — I82.462 ACUTE DEEP VEIN THROMBOSIS (DVT) OF CALF MUSCLE VEIN OF LEFT LOWER EXTREMITY (HCC): Primary | ICD-10-CM

## 2024-02-29 DIAGNOSIS — M25.572 ACUTE LEFT ANKLE PAIN: ICD-10-CM

## 2024-02-29 DIAGNOSIS — L81.9 DISCOLORATION OF SKIN OF LOWER LEG: ICD-10-CM

## 2024-02-29 PROCEDURE — 1036F TOBACCO NON-USER: CPT | Performed by: NURSE PRACTITIONER

## 2024-02-29 PROCEDURE — 1090F PRES/ABSN URINE INCON ASSESS: CPT | Performed by: NURSE PRACTITIONER

## 2024-02-29 PROCEDURE — G8484 FLU IMMUNIZE NO ADMIN: HCPCS | Performed by: NURSE PRACTITIONER

## 2024-02-29 PROCEDURE — 1123F ACP DISCUSS/DSCN MKR DOCD: CPT | Performed by: NURSE PRACTITIONER

## 2024-02-29 PROCEDURE — 99213 OFFICE O/P EST LOW 20 MIN: CPT | Performed by: NURSE PRACTITIONER

## 2024-02-29 PROCEDURE — G8420 CALC BMI NORM PARAMETERS: HCPCS | Performed by: NURSE PRACTITIONER

## 2024-02-29 PROCEDURE — G8427 DOCREV CUR MEDS BY ELIG CLIN: HCPCS | Performed by: NURSE PRACTITIONER

## 2024-02-29 NOTE — PROGRESS NOTES
Marina E Andrew  1935        Chief Complaint   Patient presents with    Follow-up     Left ankle and foot swollen and painful. Shooting pains too.            Assessment/Plan:     1. Acute deep vein thrombosis (DVT) of calf muscle vein of left lower extremity (HCC)  Referral placed and further evaluation  - VL Extremity Venous Left; Future  - Alex Garcia MD, Vascular/Endovascular Surgery, [facility]    2. Acute left ankle pain  Referral placed and further evaluation  - VL Extremity Venous Left; Future  - Alex Garcia MD, Vascular/Endovascular Surgery, [facility]    3. Discoloration of skin of lower leg  - VL Extremity Venous Left; Future  - Alex Garcia MD, Vascular/Endovascular Surgery, [facility]    4. Prolonged capillary refill time  - Alex Garcia MD, Vascular/Endovascular Surgery, [facility]    Enc compression sock      Return if symptoms worsen or fail to improve.      HPI:      Dx acute DVT, 10/2023   Acute totally occluding deep vein thrombosis involving a left gastrocnemius   vein.   Acute totally occluding deep vein thrombosis involving a left soleal vein.   No other evidence of deep or superficial venous thrombosis involving the   left lower extremity and the contralateral proximal common femoral vein.     Maintains Eliquis    Hx fx L hip.     L foot pain around ankle pain. Pain radiates into toes. \"Zaps\", tenderness.   Feels numbness or \"no feelings\" in foot.     BP (!) 96/58   Pulse 83   Temp 97.5 °F (36.4 °C)   Wt 50.8 kg (112 lb)   SpO2 96%   BMI 19.22 kg/m²     Prior to Visit Medications    Medication Sig Taking? Authorizing Provider   cholestyramine (QUESTRAN) 4 g packet Take 1 packet by mouth daily Yes Kulwant Cabrera APRN - CNP   propranolol (INDERAL) 10 MG tablet TAKE ONE (1) TABLET BY MOUTH TWO (2) TIMES DAILY Yes Jennifer Robertson APRN - CNP   calcium carbonate (OSCAL) 500 MG TABS tablet Take 1 tablet by mouth daily Calcium plus D 600 mg daily

## 2024-02-29 NOTE — TELEPHONE ENCOUNTER
Patient's son howie is calling and states that there have been tests scheduled and the patient wants Kulwant Cabrera NP know when they are.      VL Extremity Venous Left-3-    Vascular-Dr. OttKpmdc-4-      Cholo Lema-Son In-Law    [phone]      I didn't see the son in-law in the contact information. May need him to sign a paper when he comes in the office next with the patient.

## 2024-02-29 NOTE — PATIENT INSTRUCTIONS
Schedule ultrasound left leg - [facility] Central Scheduling : [phone]    Make appointment with Dr Ott or colleague, call today!

## 2024-03-01 NOTE — TELEPHONE ENCOUNTER
Please call patient and let her know that those are appropriate dates for work-up and if any symptoms worsen, go to ER or call our office.

## 2024-03-04 ASSESSMENT — ENCOUNTER SYMPTOMS
VOMITING: 0
NAUSEA: 0
SINUS PRESSURE: 0
DIARRHEA: 0
COUGH: 0
FACIAL SWELLING: 0
SORE THROAT: 0

## 2024-03-14 ENCOUNTER — HOSPITAL ENCOUNTER (OUTPATIENT)
Dept: VASCULAR LAB | Age: 89
Discharge: HOME | End: 2024-03-14
Payer: MEDICARE

## 2024-03-14 DIAGNOSIS — L81.9 DISCOLORATION OF SKIN OF LOWER LEG: ICD-10-CM

## 2024-03-14 DIAGNOSIS — I82.462 ACUTE DEEP VEIN THROMBOSIS (DVT) OF CALF MUSCLE VEIN OF LEFT LOWER EXTREMITY (HCC): ICD-10-CM

## 2024-03-14 DIAGNOSIS — M25.572 ACUTE LEFT ANKLE PAIN: ICD-10-CM

## 2024-03-14 PROCEDURE — 93971 EXTREMITY STUDY: CPT

## 2024-03-15 ENCOUNTER — OFFICE VISIT (OUTPATIENT)
Dept: VASCULAR SURGERY | Age: 89
End: 2024-03-15

## 2024-03-15 VITALS
HEIGHT: 64 IN | WEIGHT: 112 LBS | DIASTOLIC BLOOD PRESSURE: 60 MMHG | SYSTOLIC BLOOD PRESSURE: 110 MMHG | BODY MASS INDEX: 19.12 KG/M2

## 2024-03-15 DIAGNOSIS — Z86.718 HISTORY OF DVT (DEEP VEIN THROMBOSIS): Primary | ICD-10-CM

## 2024-03-16 NOTE — PROGRESS NOTES
Outpatient Consultation / H&P    Date of Consultation:  3/15/2024    PCP:  Kulwant Cabrera APRN - CNP     Referring Provider:  Kulwant Cabrera     Chief Complaint:   Chief Complaint   Patient presents with    Other     Patient ref by DEBBIE Hoang for [email or web address]        History of Present Illness:   We are asked to see this patient in consultation by Kulwant Cabrera regarding DVT.  Marina Morales is a 88 y.o. female who was diagnosed with left calf muscle vein DVT in October.  She was started on Eliquis.  She also has a history of afib.  Recent duplex exam was performed showing resolution of DVT's.  She does have swelling of bilateral lower leg swelling, worse on the left.  She is not wearing compression/support stockings.      Past Medical History:  Past Medical History:   Diagnosis Date    Acute deep vein thrombosis (DVT) of calf muscle vein of left lower extremity (HCC) 10/12/2023    Arthritis     Cataract     H/O colonoscopy     Hyperlipidemia     Osteoporosis        Past Surgical History:  Past Surgical History:   Procedure Laterality Date    CATARACT REMOVAL      COLONOSCOPY      HIP SURGERY Left 5/14/2023    LEFT HIP OPEN REDUCTION INTERNAL FIXATION INTRAMEDULLARY NAILING performed by Grady Irwin DO at [facility] OR    SIGMOIDOSCOPY N/A 11/13/2023    SIGMOIDOSCOPY BIOPSY FLEXIBLE performed by Candido Luo DO at [facility] ENDOSCOPY       Home Medications:   Prior to Admission medications    Medication Sig Start Date End Date Taking? Authorizing Provider   cholestyramine (QUESTRAN) 4 g packet Take 1 packet by mouth daily 1/11/24  Yes Kulwant Cabrera APRN - CNP   propranolol (INDERAL) 10 MG tablet TAKE ONE (1) TABLET BY MOUTH TWO (2) TIMES DAILY 12/29/23  Yes Jennifer Robertson APRN - CNP   calcium carbonate (OSCAL) 500 MG TABS tablet Take 1 tablet by mouth daily Calcium plus D 600 mg daily   Yes Provider, MD Sushil   Multiple Vitamins-Minerals (THERAPEUTIC MULTIVITAMIN-MINERALS) tablet Take 1 tablet by mouth

## 2024-04-25 NOTE — TELEPHONE ENCOUNTER
Refill request for ELIQUIS 5MG TABLET medication.     Name of Pharmacy- [facility]      Last visit - 2-29+-2024     Pending visit - 6-    Last refill - 3-      Medication Contract signed -   Last Oarrs ran-         Additional Comments

## 2024-04-26 RX ORDER — APIXABAN 5 MG/1
TABLET, FILM COATED ORAL
Qty: 60 TABLET | Refills: 3 | Status: SHIPPED | OUTPATIENT
Start: 2024-04-26

## 2024-05-08 ENCOUNTER — TELEPHONE (OUTPATIENT)
Dept: INTERNAL MEDICINE CLINIC | Age: 89
End: 2024-05-08

## 2024-05-08 NOTE — TELEPHONE ENCOUNTER
Patient's POA is calling today asking to complete a new HIPAA form to add additional names.    Will be e-mailing the form to [email or web address]

## 2024-06-24 DIAGNOSIS — R25.1 TREMOR: ICD-10-CM

## 2024-06-24 NOTE — TELEPHONE ENCOUNTER
Refill request for POT CL MICRO 20MEQ ER, PROPRANOLOL 10MG medication.     Name of Pharmacy- [facility]      Last visit - 2-     Pending visit - 6-    Last refill - 6-      Medication Contract signed -   Last Oarrs ran-         Additional Comments

## 2024-06-25 DIAGNOSIS — R25.1 TREMOR: ICD-10-CM

## 2024-06-25 RX ORDER — POTASSIUM CHLORIDE 20 MEQ/1
20 TABLET, EXTENDED RELEASE ORAL DAILY
Qty: 12 TABLET | Refills: 0 | OUTPATIENT
Start: 2024-06-25

## 2024-06-25 RX ORDER — PROPRANOLOL HYDROCHLORIDE 10 MG/1
10 TABLET ORAL 2 TIMES DAILY
Qty: 24 TABLET | Refills: 0 | OUTPATIENT
Start: 2024-06-25

## 2024-06-25 NOTE — TELEPHONE ENCOUNTER
[facility] called office today stating they have absorbed all prescriptions for this patient after [facility] pharmacy closed. They are putting together her compliance packs to be ready for tomorrow. They need propanol and the potassium approved in order to send them out.     Please advise.

## 2024-06-26 RX ORDER — POTASSIUM CHLORIDE 20 MEQ/1
20 TABLET, EXTENDED RELEASE ORAL DAILY
Qty: 90 TABLET | Refills: 1 | Status: SHIPPED | OUTPATIENT
Start: 2024-06-26

## 2024-06-26 RX ORDER — PROPRANOLOL HYDROCHLORIDE 10 MG/1
TABLET ORAL
Qty: 180 TABLET | Refills: 1 | Status: SHIPPED | OUTPATIENT
Start: 2024-06-26

## 2024-06-27 ENCOUNTER — OFFICE VISIT (OUTPATIENT)
Dept: INTERNAL MEDICINE CLINIC | Age: 89
End: 2024-06-27
Payer: MEDICARE

## 2024-06-27 VITALS
WEIGHT: 111 LBS | HEART RATE: 73 BPM | TEMPERATURE: 97.5 F | OXYGEN SATURATION: 97 % | BODY MASS INDEX: 19.04 KG/M2 | SYSTOLIC BLOOD PRESSURE: 104 MMHG | DIASTOLIC BLOOD PRESSURE: 62 MMHG

## 2024-06-27 DIAGNOSIS — I48.0 PAROXYSMAL ATRIAL FIBRILLATION (HCC): ICD-10-CM

## 2024-06-27 DIAGNOSIS — I27.20 PULMONARY HYPERTENSION, UNSPECIFIED (HCC): ICD-10-CM

## 2024-06-27 DIAGNOSIS — E44.0 MODERATE MALNUTRITION (HCC): ICD-10-CM

## 2024-06-27 DIAGNOSIS — F33.41 RECURRENT MAJOR DEPRESSIVE DISORDER, IN PARTIAL REMISSION (HCC): ICD-10-CM

## 2024-06-27 DIAGNOSIS — Z86.718 HISTORY OF DVT (DEEP VEIN THROMBOSIS): Primary | ICD-10-CM

## 2024-06-27 DIAGNOSIS — R19.7 DIARRHEA, UNSPECIFIED TYPE: ICD-10-CM

## 2024-06-27 PROCEDURE — 99214 OFFICE O/P EST MOD 30 MIN: CPT | Performed by: NURSE PRACTITIONER

## 2024-06-27 PROCEDURE — 1123F ACP DISCUSS/DSCN MKR DOCD: CPT | Performed by: NURSE PRACTITIONER

## 2024-06-27 PROCEDURE — G8427 DOCREV CUR MEDS BY ELIG CLIN: HCPCS | Performed by: NURSE PRACTITIONER

## 2024-06-27 PROCEDURE — G8420 CALC BMI NORM PARAMETERS: HCPCS | Performed by: NURSE PRACTITIONER

## 2024-06-27 PROCEDURE — 1090F PRES/ABSN URINE INCON ASSESS: CPT | Performed by: NURSE PRACTITIONER

## 2024-06-27 PROCEDURE — 1036F TOBACCO NON-USER: CPT | Performed by: NURSE PRACTITIONER

## 2024-06-27 RX ORDER — BENZONATATE 100 MG/1
100 CAPSULE ORAL 3 TIMES DAILY PRN
COMMUNITY

## 2024-06-27 RX ORDER — AMOXICILLIN AND CLAVULANATE POTASSIUM 600; 42.9 MG/5ML; MG/5ML
600 POWDER, FOR SUSPENSION ORAL 2 TIMES DAILY
COMMUNITY

## 2024-06-27 RX ORDER — URSODIOL 300 MG/1
250 CAPSULE ORAL 2 TIMES DAILY
COMMUNITY

## 2024-06-27 RX ORDER — AZITHROMYCIN 250 MG/1
250 TABLET, FILM COATED ORAL 2 TIMES DAILY
COMMUNITY

## 2024-06-27 RX ORDER — GUAIFENESIN 400 MG/1
400 TABLET ORAL 4 TIMES DAILY PRN
COMMUNITY

## 2024-06-27 NOTE — PROGRESS NOTES
Substance and Sexual Activity    Alcohol use: Not Currently     Comment: occasional    Drug use: No    Sexual activity: Not Currently     Partners: Male     Comment:    Other Topics Concern    Not on file   Social History Narrative    Not on file     Social Determinants of Health     Financial Resource Strain: Low Risk  (2/10/2023)    Overall Financial Resource Strain (CARDIA)     Difficulty of Paying Living Expenses: Not very hard   Food Insecurity: Not on file (11/7/2023)   Transportation Needs: No Transportation Needs (11/17/2023)    Transportation Problems ([facility] HRSN)     In the past 12 months, has lack of reliable transportation kept you from medical appointments, meetings, work or from getting things needed for daily living?: Not on file   Physical Activity: Not on file   Stress: Not on file   Social Connections: Not on file   Intimate Partner Violence: Not on file   Housing Stability: Low Risk  (11/7/2023)    Housing Stability Vital Sign     Unable to Pay for Housing in the Last Year: No     Number of Places Lived in the Last Year: 1     Unstable Housing in the Last Year: No       Review of Systems   Constitutional:  Negative for appetite change, chills, fatigue, fever and unexpected weight change.   HENT:  Negative for congestion, ear discharge, ear pain, facial swelling, hearing loss, sinus pressure, sneezing and sore throat.    Respiratory:  Negative for cough.    Cardiovascular:  Positive for leg swelling. Negative for chest pain.   Gastrointestinal:  Positive for diarrhea. Negative for abdominal pain, nausea and vomiting.   Genitourinary:  Negative for difficulty urinating, dysuria, hematuria and urgency.   Musculoskeletal:  Negative for arthralgias and gait problem.   Neurological:  Negative for dizziness, weakness and headaches.   Hematological:  Negative for adenopathy.   Psychiatric/Behavioral:  Negative for sleep disturbance and suicidal ideas.        Physical Exam  Vitals reviewed.   HENT:

## 2024-06-27 NOTE — PATIENT INSTRUCTIONS
I will look for Eliquis samples for you. You can find out from  if they will give discount or send samples to you  I will review Dr Alves notes on diarrhea but for now, I am happy with weight and keep eating what you wish!  Finish antibiotics

## 2024-06-30 PROBLEM — F33.41 RECURRENT MAJOR DEPRESSIVE DISORDER, IN PARTIAL REMISSION (HCC): Status: ACTIVE | Noted: 2024-06-30

## 2024-06-30 PROBLEM — I27.20 PULMONARY HYPERTENSION, UNSPECIFIED (HCC): Status: ACTIVE | Noted: 2024-06-30

## 2024-07-12 NOTE — TELEPHONE ENCOUNTER
Refill request for cholestyramine medication.     Name of Pharmacy- [facility]      Last visit - 6/27/2024     Pending visit - 12/26/2024     Last refill -1/11/2024      Medication Contract signed -   Last Oarrs ran-         Additional Comments

## 2024-07-14 RX ORDER — CHOLESTYRAMINE 4 G/9G
1 POWDER, FOR SUSPENSION ORAL DAILY
Qty: 90 PACKET | Refills: 3 | Status: SHIPPED | OUTPATIENT
Start: 2024-07-14

## 2024-08-12 RX ORDER — APIXABAN 5 MG/1
5 TABLET, FILM COATED ORAL 2 TIMES DAILY
Qty: 128 TABLET | Refills: 0 | Status: SHIPPED | OUTPATIENT
Start: 2024-08-12

## 2024-08-12 RX ORDER — CITALOPRAM HYDROBROMIDE 10 MG/1
TABLET ORAL
Qty: 408 TABLET | Refills: 0 | Status: SHIPPED | OUTPATIENT
Start: 2024-08-12

## 2024-08-12 NOTE — TELEPHONE ENCOUNTER
Eliquis  LAST REFILL 04/26/2024  AMOUNT 60     3 REFILLS    Citalopram  LAST REFILL 11/14/2023  AMOUNT 30     3 REFILLS    LAST VISIT 06/27/2024  NEXT VISIT 12/26/2024

## 2024-08-30 ENCOUNTER — TELEPHONE (OUTPATIENT)
Dept: INTERNAL MEDICINE CLINIC | Age: 89
End: 2024-08-30

## 2024-08-30 RX ORDER — HYDROXYZINE HYDROCHLORIDE 25 MG/1
25 TABLET, FILM COATED ORAL NIGHTLY PRN
Qty: 30 TABLET | Refills: 0 | Status: SHIPPED | OUTPATIENT
Start: 2024-08-30 | End: 2024-09-29

## 2024-08-30 NOTE — TELEPHONE ENCOUNTER
Patient's grand daughter Kylie called.  Patient's son passed away yesterday.  Patient is quite upset and unable to sleep.  Can you send something to pharmacy to help her.  Please call Arabella back at [phone] and let her know.      : [facility]/pharmacy #[id number] - [place], [place] - [address] [phone] - F [phone]

## 2024-08-30 NOTE — TELEPHONE ENCOUNTER
Spoke with patient's granddaughter. She used Benadryl last night and this was ok. She is having anxiety and racing thoughts at nighttime. She hyperventilates when thinking about her son.     Will use Hydroxyzine 25mg as needed at bedtime. Update me in 3-4 days on how patient is doing.

## 2024-09-19 DIAGNOSIS — R25.1 TREMOR: ICD-10-CM

## 2024-09-20 RX ORDER — PROPRANOLOL HCL 10 MG
TABLET ORAL
Qty: 180 TABLET | Refills: 1 | Status: SHIPPED | OUTPATIENT
Start: 2024-09-20

## 2024-09-21 DIAGNOSIS — F41.9 ANXIETY: Primary | ICD-10-CM

## 2024-09-23 RX ORDER — HYDROXYZINE HYDROCHLORIDE 25 MG/1
25 TABLET, FILM COATED ORAL NIGHTLY PRN
Qty: 90 TABLET | Refills: 0 | Status: SHIPPED | OUTPATIENT
Start: 2024-09-23 | End: 2024-12-22

## 2024-10-23 NOTE — TELEPHONE ENCOUNTER
Refill request for  eliquis  medication.     Name of Pharmacy- [facility]       Last visit -  6/27/2024      Pending visit -  12/26/2024     Last refill - 8/12/2024       Medication Contract signed -    Last Oarrs ran-          Additional Comments

## 2024-11-04 ENCOUNTER — TELEPHONE (OUTPATIENT)
Dept: INTERNAL MEDICINE CLINIC | Age: 89
End: 2024-11-04

## 2024-11-04 NOTE — TELEPHONE ENCOUNTER
Patient's daughter is calling today stating the patient has been complaining of mid to lower back pain.  She has had this before and       Kylie - [phone]

## 2024-11-05 NOTE — TELEPHONE ENCOUNTER
Kylie called back to check the status of this communication. She said patient is in a lot of pain. She possibly has a muscle strain. She put a lidocaine patch on today, but wants to know if there is anything else that can be done for patient while she waits for her visit with Kulwant on Thursday.    Kylie - [phone]

## 2024-11-06 ENCOUNTER — OFFICE VISIT (OUTPATIENT)
Dept: INTERNAL MEDICINE CLINIC | Age: 89
End: 2024-11-06

## 2024-11-06 ENCOUNTER — TELEPHONE (OUTPATIENT)
Dept: INTERNAL MEDICINE CLINIC | Age: 89
End: 2024-11-06

## 2024-11-06 VITALS
TEMPERATURE: 97.8 F | DIASTOLIC BLOOD PRESSURE: 64 MMHG | SYSTOLIC BLOOD PRESSURE: 112 MMHG | WEIGHT: 123 LBS | BODY MASS INDEX: 21.1 KG/M2 | HEART RATE: 78 BPM | OXYGEN SATURATION: 96 %

## 2024-11-06 DIAGNOSIS — Z87.81 HX OF COMPRESSION FRACTURE OF SPINE: ICD-10-CM

## 2024-11-06 DIAGNOSIS — M54.50 LUMBAR PAIN: Primary | ICD-10-CM

## 2024-11-06 SDOH — ECONOMIC STABILITY: FOOD INSECURITY: WITHIN THE PAST 12 MONTHS, YOU WORRIED THAT YOUR FOOD WOULD RUN OUT BEFORE YOU GOT MONEY TO BUY MORE.: NEVER TRUE

## 2024-11-06 SDOH — ECONOMIC STABILITY: INCOME INSECURITY: HOW HARD IS IT FOR YOU TO PAY FOR THE VERY BASICS LIKE FOOD, HOUSING, MEDICAL CARE, AND HEATING?: NOT VERY HARD

## 2024-11-06 SDOH — ECONOMIC STABILITY: FOOD INSECURITY: WITHIN THE PAST 12 MONTHS, THE FOOD YOU BOUGHT JUST DIDN'T LAST AND YOU DIDN'T HAVE MONEY TO GET MORE.: NEVER TRUE

## 2024-11-06 ASSESSMENT — PATIENT HEALTH QUESTIONNAIRE - PHQ9
SUM OF ALL RESPONSES TO PHQ QUESTIONS 1-9: 0
3. TROUBLE FALLING OR STAYING ASLEEP: NOT AT ALL
9. THOUGHTS THAT YOU WOULD BE BETTER OFF DEAD, OR OF HURTING YOURSELF: NOT AT ALL
7. TROUBLE CONCENTRATING ON THINGS, SUCH AS READING THE NEWSPAPER OR WATCHING TELEVISION: NOT AT ALL
2. FEELING DOWN, DEPRESSED OR HOPELESS: NOT AT ALL
4. FEELING TIRED OR HAVING LITTLE ENERGY: NOT AT ALL
6. FEELING BAD ABOUT YOURSELF - OR THAT YOU ARE A FAILURE OR HAVE LET YOURSELF OR YOUR FAMILY DOWN: NOT AT ALL
8. MOVING OR SPEAKING SO SLOWLY THAT OTHER PEOPLE COULD HAVE NOTICED. OR THE OPPOSITE, BEING SO FIGETY OR RESTLESS THAT YOU HAVE BEEN MOVING AROUND A LOT MORE THAN USUAL: NOT AT ALL
10. IF YOU CHECKED OFF ANY PROBLEMS, HOW DIFFICULT HAVE THESE PROBLEMS MADE IT FOR YOU TO DO YOUR WORK, TAKE CARE OF THINGS AT HOME, OR GET ALONG WITH OTHER PEOPLE: NOT DIFFICULT AT ALL
SUM OF ALL RESPONSES TO PHQ QUESTIONS 1-9: 0
5. POOR APPETITE OR OVEREATING: NOT AT ALL

## 2024-11-06 ASSESSMENT — ENCOUNTER SYMPTOMS
COUGH: 0
VOMITING: 0
DIARRHEA: 0
SORE THROAT: 0
SINUS PRESSURE: 0
NAUSEA: 0
FACIAL SWELLING: 0
BACK PAIN: 1

## 2024-11-06 NOTE — TELEPHONE ENCOUNTER
Patient's granddaughter, Kylie is calling today stating patient went to [facility] to have x-ray.  An MRI was scheduled for a future date and patient was fitted for a back brace.  No other treatment was offered.     Patient is wondering about trying prednisone and would like to know if she could use OTC Voltaren gel?    Please advise and call granddaughter, [phone]   SHIFT CONTRACT FOR: JOSE LUIS    You are being placed on a shift contract for the following reasons:   GIVING OUT PERSONAL INFORMATION TO PEERS    Expected behavioral changes:   FOLLOW ALL UNIT RULES AND EXPECTATIONS  RESPECTING YOUR PEERS, YOURSELF, AND STAFF BY FOLLOWING EXPECTATIONS   DO NOT ACCEPT OR GIVE OUT PERSONAL INFORMATION     Contract will begin on:  THURSDAY, 9/14/17 AT 10:00 AM     In order to complete your behavior contract you must obtain 8 OKs   For every hour of  not-Oks  the contract will be continued an additional hour.   -At the end of each hour, you are to ask the staff in charge of the group for an  OK  or a  not OK  depending upon your participation.   -You are to be in your room and remain in your room during meals, snack and free time; otherwise you will not be given an  OK  for the hour.   -You may begin to gain OKs once you agree to appropriately engage in the unit and follow our expectations   NO FREE TIME or PRIVILEGES UNTIL THE CONTRACT IS COMPLETED     8-9   am  9-10 am  10-11 am  11-12 am  12-1   pm  1-2   pm  2-3   pm  3-4   pm  4-5   pm  5-6   pm  6-7   pm  7-8   pm  8-9   pm  9-10   pm    Ok/Not Ok                 Staff Initial                  8-9   am  9-10 am  10-11 am  11-12 am  12-1   pm  1-2   pm  2-3   pm  3-4   pm  4-5   pm  5-6   pm  6-7   pm  7-8   pm  8-9   pm  9-10   pm    Ok/Not Ok                 Staff Initial                   ______________________     _____________________________  Patient Signature      Staff Signature

## 2024-11-06 NOTE — PROGRESS NOTES
Marina Barrerat  1935        Chief Complaint   Patient presents with   • Back Pain     Since Saturday        Assessment/Plan:     1. Lumbar pain  D/t hx and severity of pain, recommend [facility] walk-in. Granddaughter is familiar.   Make treatment plan from this visit.     2. Hx of compression fracture of spine      Return if symptoms worsen or fail to improve.      HPI:      Pt started having pain in lumbar spine, mostly R sided, about 2-3 weeks ago. She does not recall a specific fall or injury except a tough window she closed. No falls. She has minimal pain with rest, lying flat or walking. Pain occurs intermittently with certain position changes. Denies weakness in legs. Denies incontinence. Denies UE weakness.   No radicular pain.   Mild improvement w/ Tylenol. Lidocaine patch , no relief.     Hx compression fractures. Brace and inpatient rehab improved symptoms 10/2023.       /64   Pulse 78   Temp 97.8 °F (36.6 °C)   Wt 55.8 kg (123 lb)   SpO2 96%   BMI 21.10 kg/m²     Prior to Visit Medications    Medication Sig Taking? Authorizing Provider   apixaban (ELIQUIS) 5 MG TABS tablet Take 1 tablet by mouth 2 times daily Yes Kulwant Cabrera APRN - CNP   hydrOXYzine HCl (ATARAX) 25 MG tablet Take 1 tablet by mouth nightly as needed for Anxiety Yes Kulwant Cabrera APRN - CNP   propranolol (INDERAL) 10 MG tablet TAKE ONE TABLET BY MOUTH TWO TIMES DAILY Yes Kulwant Cabrera APRN - CNP   ursodiol (ACTIGALL) 300 MG capsule Take 250 mg by mouth 2 times daily Two 250 mg tabs in a.m. and One 250 tab at night Yes Sushil Villeda MD   potassium chloride (KLOR-CON M) 20 MEQ extended release tablet Take 1 tablet by mouth daily Yes Kulwant Cabrera APRN - CNP   calcium carbonate (OSCAL) 500 MG TABS tablet Take 1 tablet by mouth daily Calcium plus D 600 mg daily Yes Sushil Villeda MD   Multiple Vitamins-Minerals (THERAPEUTIC MULTIVITAMIN-MINERALS) tablet Take 1 tablet by mouth daily Yes

## 2024-11-07 RX ORDER — PREDNISONE 20 MG/1
TABLET ORAL
Qty: 10 TABLET | Refills: 0 | Status: SHIPPED | OUTPATIENT
Start: 2024-11-07

## 2024-11-07 NOTE — TELEPHONE ENCOUNTER
I have reviewed the provider's instructions with the patient, answering all questions to her satisfaction. (Spoke to the granddaughter, Kylie).     Kylie advised she will let us know when they get the MRI and that the patient plans a covid booster when she recovers.

## 2024-11-07 NOTE — TELEPHONE ENCOUNTER
I have sent Prednisone to start once daily x 5 days.   She can use Voltaren gel sparingly as it can interact with her Eliquis but much less than PO.     Thank you for update and I hope back brace improves pain.

## 2024-11-08 ENCOUNTER — TELEPHONE (OUTPATIENT)
Dept: INTERNAL MEDICINE CLINIC | Age: 88
End: 2024-11-08

## 2024-11-08 DIAGNOSIS — M54.50 LUMBAR PAIN: Primary | ICD-10-CM

## 2024-11-08 NOTE — TELEPHONE ENCOUNTER
Patient's granddaughter, Kylie is calling today asking if a referral for PT could be sent to [facility].  Fax # [phone]    Pended PT referral

## 2024-11-08 NOTE — TELEPHONE ENCOUNTER
Kylie states [facility] didn't really do anything for the patient but take an x-ray and give her a back brace.  She states they do not have a f/u appt with ortho.    They spoke to the PT at [facility] and they stated the patient may benefit from PT.     Please advise

## 2024-11-15 ENCOUNTER — HOSPITAL ENCOUNTER (INPATIENT)
Age: 89
LOS: 3 days | Discharge: NURSING, REHAB OR LONG-TERM CARE | DRG: 543 | End: 2024-11-18
Attending: EMERGENCY MEDICINE | Admitting: INTERNAL MEDICINE
Payer: MEDICARE

## 2024-11-15 ENCOUNTER — APPOINTMENT (OUTPATIENT)
Dept: GENERAL RADIOLOGY | Age: 89
DRG: 543 | End: 2024-11-15
Payer: MEDICARE

## 2024-11-15 ENCOUNTER — TELEPHONE (OUTPATIENT)
Dept: INTERNAL MEDICINE CLINIC | Age: 89
End: 2024-11-15

## 2024-11-15 DIAGNOSIS — S32.010A CLOSED WEDGE COMPRESSION FRACTURE OF L1 VERTEBRA, INITIAL ENCOUNTER (HCC): Primary | ICD-10-CM

## 2024-11-15 DIAGNOSIS — M54.50 ACUTE BILATERAL LOW BACK PAIN WITHOUT SCIATICA: ICD-10-CM

## 2024-11-15 PROBLEM — M54.59 INTRACTABLE LOW BACK PAIN: Status: ACTIVE | Noted: 2024-11-15

## 2024-11-15 LAB
ANION GAP SERPL CALCULATED.3IONS-SCNC: 12 MMOL/L (ref 3–16)
BASOPHILS # BLD: 0.1 K/UL (ref 0–0.2)
BASOPHILS NFR BLD: 0.6 %
BUN SERPL-MCNC: 14 MG/DL (ref 7–20)
CALCIUM SERPL-MCNC: 9.1 MG/DL (ref 8.3–10.6)
CHLORIDE SERPL-SCNC: 101 MMOL/L (ref 99–110)
CO2 SERPL-SCNC: 24 MMOL/L (ref 21–32)
CREAT SERPL-MCNC: 0.6 MG/DL (ref 0.6–1.2)
DEPRECATED RDW RBC AUTO: 15.4 % (ref 12.4–15.4)
EOSINOPHIL # BLD: 0.3 K/UL (ref 0–0.6)
EOSINOPHIL NFR BLD: 3.4 %
GFR SERPLBLD CREATININE-BSD FMLA CKD-EPI: 86 ML/MIN/{1.73_M2}
GLUCOSE SERPL-MCNC: 103 MG/DL (ref 70–99)
HCT VFR BLD AUTO: 42.4 % (ref 36–48)
HGB BLD-MCNC: 14.1 G/DL (ref 12–16)
LYMPHOCYTES # BLD: 2.6 K/UL (ref 1–5.1)
LYMPHOCYTES NFR BLD: 31.4 %
MCH RBC QN AUTO: 32.1 PG (ref 26–34)
MCHC RBC AUTO-ENTMCNC: 33.2 G/DL (ref 31–36)
MCV RBC AUTO: 96.7 FL (ref 80–100)
MONOCYTES # BLD: 1 K/UL (ref 0–1.3)
MONOCYTES NFR BLD: 12 %
NEUTROPHILS # BLD: 4.4 K/UL (ref 1.7–7.7)
NEUTROPHILS NFR BLD: 52.6 %
PLATELET # BLD AUTO: 271 K/UL (ref 135–450)
PMV BLD AUTO: 7.4 FL (ref 5–10.5)
POTASSIUM SERPL-SCNC: 4.4 MMOL/L (ref 3.5–5.1)
RBC # BLD AUTO: 4.38 M/UL (ref 4–5.2)
SODIUM SERPL-SCNC: 137 MMOL/L (ref 136–145)
WBC # BLD AUTO: 8.3 K/UL (ref 4–11)

## 2024-11-15 PROCEDURE — 6370000000 HC RX 637 (ALT 250 FOR IP): Performed by: EMERGENCY MEDICINE

## 2024-11-15 PROCEDURE — 36415 COLL VENOUS BLD VENIPUNCTURE: CPT

## 2024-11-15 PROCEDURE — 99285 EMERGENCY DEPT VISIT HI MDM: CPT

## 2024-11-15 PROCEDURE — 6370000000 HC RX 637 (ALT 250 FOR IP): Performed by: INTERNAL MEDICINE

## 2024-11-15 PROCEDURE — 1200000000 HC SEMI PRIVATE

## 2024-11-15 PROCEDURE — 80048 BASIC METABOLIC PNL TOTAL CA: CPT

## 2024-11-15 PROCEDURE — 85025 COMPLETE CBC W/AUTO DIFF WBC: CPT

## 2024-11-15 PROCEDURE — 72072 X-RAY EXAM THORAC SPINE 3VWS: CPT

## 2024-11-15 PROCEDURE — 72110 X-RAY EXAM L-2 SPINE 4/>VWS: CPT

## 2024-11-15 RX ORDER — CITALOPRAM HYDROBROMIDE 20 MG/1
20 TABLET ORAL
Status: DISCONTINUED | OUTPATIENT
Start: 2024-11-15 | End: 2024-11-18 | Stop reason: HOSPADM

## 2024-11-15 RX ORDER — HYDROCODONE BITARTRATE AND ACETAMINOPHEN 5; 325 MG/1; MG/1
1 TABLET ORAL
Status: COMPLETED | OUTPATIENT
Start: 2024-11-15 | End: 2024-11-15

## 2024-11-15 RX ORDER — HYDROCODONE BITARTRATE AND ACETAMINOPHEN 5; 325 MG/1; MG/1
1 TABLET ORAL EVERY 8 HOURS
Status: COMPLETED | OUTPATIENT
Start: 2024-11-15 | End: 2024-11-17

## 2024-11-15 RX ORDER — PROPRANOLOL HYDROCHLORIDE 10 MG/1
10 TABLET ORAL 2 TIMES DAILY
Status: DISCONTINUED | OUTPATIENT
Start: 2024-11-15 | End: 2024-11-18 | Stop reason: HOSPADM

## 2024-11-15 RX ORDER — LIDOCAINE 4 G/G
1 PATCH TOPICAL DAILY
Status: DISCONTINUED | OUTPATIENT
Start: 2024-11-15 | End: 2024-11-18 | Stop reason: HOSPADM

## 2024-11-15 RX ORDER — HYDROXYZINE HYDROCHLORIDE 25 MG/1
25 TABLET, FILM COATED ORAL NIGHTLY PRN
Status: DISCONTINUED | OUTPATIENT
Start: 2024-11-15 | End: 2024-11-18 | Stop reason: HOSPADM

## 2024-11-15 RX ADMIN — HYDROCODONE BITARTRATE AND ACETAMINOPHEN 1 TABLET: 5; 325 TABLET ORAL at 17:23

## 2024-11-15 RX ADMIN — CITALOPRAM HYDROBROMIDE 20 MG: 20 TABLET ORAL at 21:46

## 2024-11-15 RX ADMIN — PROPRANOLOL HYDROCHLORIDE 10 MG: 10 TABLET ORAL at 21:47

## 2024-11-15 RX ADMIN — HYDROCODONE BITARTRATE AND ACETAMINOPHEN 1 TABLET: 5; 325 TABLET ORAL at 21:46

## 2024-11-15 ASSESSMENT — PAIN SCALES - GENERAL
PAINLEVEL_OUTOF10: 2
PAINLEVEL_OUTOF10: 8
PAINLEVEL_OUTOF10: 5
PAINLEVEL_OUTOF10: 8

## 2024-11-15 ASSESSMENT — PAIN - FUNCTIONAL ASSESSMENT
PAIN_FUNCTIONAL_ASSESSMENT: 0-10
PAIN_FUNCTIONAL_ASSESSMENT: 0-10

## 2024-11-15 NOTE — TELEPHONE ENCOUNTER
Sakina Ndiaye, OT from [facility] called and they have no record of her being seen in the office. The granddaughter Kylie even wants a order put in for her to have PT at [facility].

## 2024-11-15 NOTE — TELEPHONE ENCOUNTER
Have we called [facility] yet? I would do PT and see if they will treat with Gabapentin or if I have to.

## 2024-11-15 NOTE — TELEPHONE ENCOUNTER
Patient's granddaughter Kylie is calling and states that her grandma, the patient is still feeling uncomfortable and the medicine Prednisone 20 mg and it didn't do much for her. She has been taking OTC Tylenol 500 mg 2 QID and she can't really take much because of the Eliquis. Kylie is wanting to know if Gabapentin could sent for her to get any relief because she is having discomfort and pain.       Mzuzxjuvt-Fpwvdcyktkiky-Oqxpt a call if it is sent in.     [phone]

## 2024-11-15 NOTE — TELEPHONE ENCOUNTER
Can you call [facility] to see if this was recommended? She was seen in [place] the same day I saw her in my office, [facility].

## 2024-11-15 NOTE — ED PROVIDER NOTES
Emergency Department Provider Note  Location: [facility]  ED  11/15/2024     Patient Identification  Marina Morales is a 89 y.o. female    Chief Complaint  Back Pain (Patient brought in by EMS from chronic lower back pain, patient has hx of compression fractures. Patient lives in independent living at [facility].)          HPI  (History provided by patient)  Patient presents with ongoing low back pain, that has worsened over the past 10 days.  She reports pain in her low back-seems to alternate on which side is more painful.  She has a history of DVT, on Eliquis.  History of osteoporosis and prior compression fractures.  She has been seeing her PCP for this pain.  She is currently on a prednisone course.  She has upcoming MRI and physical therapy ordered for next week.  Denies recent illness, fever, urine changes, incontinence, abdominal pain, numbness, weakness    Nursing Notes reviewed.    Allergies:   Allergies   Allergen Reactions    Morphine Hallucinations    Tramadol      Hypersomnia       Past medical history:  has a past medical history of Acute deep vein thrombosis (DVT) of calf muscle vein of left lower extremity (HCC) (10/12/2023), Arthritis, Cataract, H/O colonoscopy, Hyperlipidemia, and Osteoporosis.    Past surgical history:  has a past surgical history that includes Cataract removal; Colonoscopy; hip surgery (Left, 5/14/2023); and sigmoidoscopy (N/A, 11/13/2023).    Home medications:   Prior to Admission medications    Medication Sig Start Date End Date Taking? Authorizing Provider   apixaban (ELIQUIS) 5 MG TABS tablet Take 1 tablet by mouth 2 times daily 10/23/24  Yes Kulwant Cabrera APRN - CNP   propranolol (INDERAL) 10 MG tablet TAKE ONE TABLET BY MOUTH TWO TIMES DAILY 9/20/24  Yes Kulwant Cabrera APRN - CNP   ursodiol (ACTIGALL) 300 MG capsule Take 250 mg by mouth 2 times daily Two 250 mg tabs in a.m. and One 250 tab at night   Yes Provider, MD Sushil   potassium chloride

## 2024-11-15 NOTE — TELEPHONE ENCOUNTER
[facility] with [facility] is calling and Sakina Ndiaye OT states that she called [facility] and they have no record of seeing her. She needs a order for Outpatient Physical Therapy for [facility] to have it done there.         Direct Fax Number-Therapy    [phone]    Phone Number-Ask for Therapy  [phone]

## 2024-11-16 PROCEDURE — 6370000000 HC RX 637 (ALT 250 FOR IP): Performed by: INTERNAL MEDICINE

## 2024-11-16 PROCEDURE — 1200000000 HC SEMI PRIVATE

## 2024-11-16 PROCEDURE — 36415 COLL VENOUS BLD VENIPUNCTURE: CPT

## 2024-11-16 PROCEDURE — 87040 BLOOD CULTURE FOR BACTERIA: CPT

## 2024-11-16 PROCEDURE — 2580000003 HC RX 258: Performed by: INTERNAL MEDICINE

## 2024-11-16 RX ORDER — MAGNESIUM HYDROXIDE/ALUMINUM HYDROXICE/SIMETHICONE 120; 1200; 1200 MG/30ML; MG/30ML; MG/30ML
30 SUSPENSION ORAL EVERY 6 HOURS PRN
Status: DISCONTINUED | OUTPATIENT
Start: 2024-11-16 | End: 2024-11-18 | Stop reason: HOSPADM

## 2024-11-16 RX ORDER — SODIUM CHLORIDE, SODIUM LACTATE, POTASSIUM CHLORIDE, CALCIUM CHLORIDE 600; 310; 30; 20 MG/100ML; MG/100ML; MG/100ML; MG/100ML
INJECTION, SOLUTION INTRAVENOUS CONTINUOUS
Status: DISCONTINUED | OUTPATIENT
Start: 2024-11-16 | End: 2024-11-17

## 2024-11-16 RX ORDER — POTASSIUM CHLORIDE 1500 MG/1
40 TABLET, EXTENDED RELEASE ORAL PRN
Status: DISCONTINUED | OUTPATIENT
Start: 2024-11-16 | End: 2024-11-18 | Stop reason: HOSPADM

## 2024-11-16 RX ORDER — PROCHLORPERAZINE EDISYLATE 5 MG/ML
10 INJECTION INTRAMUSCULAR; INTRAVENOUS EVERY 6 HOURS PRN
Status: DISCONTINUED | OUTPATIENT
Start: 2024-11-16 | End: 2024-11-18 | Stop reason: HOSPADM

## 2024-11-16 RX ORDER — M-VIT,TX,IRON,MINS/CALC/FOLIC 27MG-0.4MG
1 TABLET ORAL DAILY
Status: DISCONTINUED | OUTPATIENT
Start: 2024-11-16 | End: 2024-11-18 | Stop reason: HOSPADM

## 2024-11-16 RX ORDER — SODIUM CHLORIDE 9 MG/ML
INJECTION, SOLUTION INTRAVENOUS PRN
Status: DISCONTINUED | OUTPATIENT
Start: 2024-11-16 | End: 2024-11-18 | Stop reason: HOSPADM

## 2024-11-16 RX ORDER — SODIUM CHLORIDE 0.9 % (FLUSH) 0.9 %
5-40 SYRINGE (ML) INJECTION PRN
Status: DISCONTINUED | OUTPATIENT
Start: 2024-11-16 | End: 2024-11-18 | Stop reason: HOSPADM

## 2024-11-16 RX ORDER — MAGNESIUM SULFATE IN WATER 40 MG/ML
2000 INJECTION, SOLUTION INTRAVENOUS PRN
Status: DISCONTINUED | OUTPATIENT
Start: 2024-11-16 | End: 2024-11-18 | Stop reason: HOSPADM

## 2024-11-16 RX ORDER — URSODIOL 300 MG/1
300 CAPSULE ORAL 2 TIMES DAILY
Status: DISCONTINUED | OUTPATIENT
Start: 2024-11-16 | End: 2024-11-18 | Stop reason: HOSPADM

## 2024-11-16 RX ORDER — ENOXAPARIN SODIUM 100 MG/ML
40 INJECTION SUBCUTANEOUS DAILY
Status: DISCONTINUED | OUTPATIENT
Start: 2024-11-16 | End: 2024-11-16

## 2024-11-16 RX ORDER — SODIUM CHLORIDE 0.9 % (FLUSH) 0.9 %
5-40 SYRINGE (ML) INJECTION EVERY 12 HOURS SCHEDULED
Status: DISCONTINUED | OUTPATIENT
Start: 2024-11-16 | End: 2024-11-18 | Stop reason: HOSPADM

## 2024-11-16 RX ORDER — ONDANSETRON 4 MG/1
4 TABLET, ORALLY DISINTEGRATING ORAL EVERY 8 HOURS PRN
Status: DISCONTINUED | OUTPATIENT
Start: 2024-11-16 | End: 2024-11-16

## 2024-11-16 RX ORDER — LORAZEPAM 2 MG/ML
0.5 INJECTION INTRAMUSCULAR DAILY PRN
Status: COMPLETED | OUTPATIENT
Start: 2024-11-16 | End: 2024-11-17

## 2024-11-16 RX ORDER — ONDANSETRON 2 MG/ML
4 INJECTION INTRAMUSCULAR; INTRAVENOUS EVERY 6 HOURS PRN
Status: DISCONTINUED | OUTPATIENT
Start: 2024-11-16 | End: 2024-11-16

## 2024-11-16 RX ORDER — SENNOSIDES A AND B 8.6 MG/1
1 TABLET, FILM COATED ORAL DAILY PRN
Status: DISCONTINUED | OUTPATIENT
Start: 2024-11-16 | End: 2024-11-18 | Stop reason: HOSPADM

## 2024-11-16 RX ORDER — POTASSIUM CHLORIDE 7.45 MG/ML
10 INJECTION INTRAVENOUS PRN
Status: DISCONTINUED | OUTPATIENT
Start: 2024-11-16 | End: 2024-11-18 | Stop reason: HOSPADM

## 2024-11-16 RX ORDER — URSODIOL 300 MG/1
250 CAPSULE ORAL 2 TIMES DAILY
Status: DISCONTINUED | OUTPATIENT
Start: 2024-11-16 | End: 2024-11-16

## 2024-11-16 RX ORDER — ACETAMINOPHEN 325 MG/1
650 TABLET ORAL EVERY 6 HOURS PRN
Status: DISCONTINUED | OUTPATIENT
Start: 2024-11-16 | End: 2024-11-18 | Stop reason: HOSPADM

## 2024-11-16 RX ORDER — OXYCODONE HYDROCHLORIDE 5 MG/1
2.5 TABLET ORAL EVERY 6 HOURS PRN
Status: DISCONTINUED | OUTPATIENT
Start: 2024-11-16 | End: 2024-11-18 | Stop reason: HOSPADM

## 2024-11-16 RX ORDER — ACETAMINOPHEN 650 MG/1
650 SUPPOSITORY RECTAL EVERY 6 HOURS PRN
Status: DISCONTINUED | OUTPATIENT
Start: 2024-11-16 | End: 2024-11-18 | Stop reason: HOSPADM

## 2024-11-16 RX ADMIN — APIXABAN 5 MG: 5 TABLET, FILM COATED ORAL at 20:12

## 2024-11-16 RX ADMIN — PROPRANOLOL HYDROCHLORIDE 10 MG: 10 TABLET ORAL at 09:16

## 2024-11-16 RX ADMIN — HYDROCODONE BITARTRATE AND ACETAMINOPHEN 1 TABLET: 5; 325 TABLET ORAL at 04:54

## 2024-11-16 RX ADMIN — APIXABAN 5 MG: 5 TABLET, FILM COATED ORAL at 09:16

## 2024-11-16 RX ADMIN — Medication 3 MG: at 20:12

## 2024-11-16 RX ADMIN — HYDROCODONE BITARTRATE AND ACETAMINOPHEN 1 TABLET: 5; 325 TABLET ORAL at 20:12

## 2024-11-16 RX ADMIN — CITALOPRAM HYDROBROMIDE 20 MG: 20 TABLET ORAL at 20:13

## 2024-11-16 RX ADMIN — SODIUM CHLORIDE, POTASSIUM CHLORIDE, SODIUM LACTATE AND CALCIUM CHLORIDE: 600; 310; 30; 20 INJECTION, SOLUTION INTRAVENOUS at 13:42

## 2024-11-16 RX ADMIN — HYDROCODONE BITARTRATE AND ACETAMINOPHEN 1 TABLET: 5; 325 TABLET ORAL at 13:39

## 2024-11-16 RX ADMIN — SENNOSIDES 8.6 MG: 8.6 TABLET, FILM COATED ORAL at 19:08

## 2024-11-16 RX ADMIN — Medication 1 TABLET: at 09:16

## 2024-11-16 RX ADMIN — URSODIOL 300 MG: 300 CAPSULE ORAL at 20:13

## 2024-11-16 RX ADMIN — SODIUM CHLORIDE, POTASSIUM CHLORIDE, SODIUM LACTATE AND CALCIUM CHLORIDE: 600; 310; 30; 20 INJECTION, SOLUTION INTRAVENOUS at 01:18

## 2024-11-16 RX ADMIN — URSODIOL 300 MG: 300 CAPSULE ORAL at 11:56

## 2024-11-16 RX ADMIN — PROPRANOLOL HYDROCHLORIDE 10 MG: 10 TABLET ORAL at 20:12

## 2024-11-16 ASSESSMENT — PAIN DESCRIPTION - LOCATION
LOCATION: BACK
LOCATION: BACK

## 2024-11-16 ASSESSMENT — PAIN DESCRIPTION - DESCRIPTORS
DESCRIPTORS: DULL
DESCRIPTORS: ACHING;DULL

## 2024-11-16 ASSESSMENT — PAIN SCALES - GENERAL
PAINLEVEL_OUTOF10: 3
PAINLEVEL_OUTOF10: 4

## 2024-11-16 ASSESSMENT — PAIN DESCRIPTION - ORIENTATION: ORIENTATION: LOWER

## 2024-11-16 NOTE — H&P
OF SYSTEMS COMPLETED:   Pertinent positives as noted in the HPI. All other systems reviewed and negative.    PHYSICAL EXAM PERFORMED:    /75   Pulse 61   Temp 97.8 °F (36.6 °C) (Oral)   Resp 16   Ht 1.626 m (5' 4\")   Wt 56.2 kg (124 lb)   SpO2 98%   BMI 21.28 kg/m²     General appearance:  No apparent distress, appears stated age and cooperative.  HEENT:  Normal cephalic, atraumatic without obvious deformity. Pupils equal, round, and reactive to light.  Extra ocular muscles intact. Conjunctivae/corneas clear.  Neck: Supple, with full range of motion. No jugular venous distention. Trachea midline.  Respiratory:  Normal respiratory effort. Clear to auscultation, bilaterally without Rales/Wheezes/Rhonchi.  Cardiovascular:  Regular rate and rhythm with normal S1/S2 without murmurs, rubs or gallops.  Abdomen: Soft, non-tender, non-distended with normal bowel sounds.  Musculoskeletal: Low back pain with no deformity no open wound ulcers no erythema  Extremities: No edema bilaterally.  Full range of motion without deformity.  Skin: Skin color, texture, turgor normal.  No rashes or lesions.  Neurologic:  Neurovascularly intact without any focal sensory/motor deficits. Cranial nerves: II-XII intact, grossly non-focal.  Psychiatric:  Alert and oriented, thought content appropriate, normal insight  Capillary Refill: Brisk,3 seconds, normal  Peripheral Pulses: +2 palpable, equal bilaterally       Labs:     Recent Labs     11/15/24  1930   WBC 8.3   HGB 14.1   HCT 42.4        Recent Labs     11/15/24  1935      K 4.4      CO2 24   BUN 14   CREATININE 0.6   CALCIUM 9.1     No results for input(s): \"AST\", \"ALT\", \"BILIDIR\", \"BILITOT\", \"ALKPHOS\" in the last 72 hours.  No results for input(s): \"INR\" in the last 72 hours.  No results for input(s): \"CKTOTAL\", \"TROPHS\" in the last 72 hours.    Urinalysis:      Lab Results   Component Value Date/Time    NITRU Negative 11/13/2017 01:50 PM    BLOODU Negative

## 2024-11-16 NOTE — ED NOTES
528 @ 7387  
External purewick placed on pt, pt tolerated well.   
Transferred patient to wheelchair and back per MD's instructions. Patient was able to transfer without difficulty, and afterwards stated \"that was easy\".   
fracture of  L1 vertebra xRay            You may also review the ED PT Care Timeline found under the Summary Tab, ED Encounter Summary, Timeline Reports, ED Patient Care Timeline.     Recommendation    Pending orders/Uncompleted orders to hand off:  inpatient orders     Additional Comments: A&O x4, lives at the [facility] in their assisted living, pain as 10/10 when she arrived but has now come down to a 2/10. I placed a purewick on pt and placed a 20g in her left forearm   If any further questions, please call Sending RN at [phone]

## 2024-11-16 NOTE — ACP (ADVANCE CARE PLANNING)
Advance Care Planning     Advance Care Planning Inpatient Note  Spiritual Care Department    Today's Date: 11/16/2024  Unit: [facility] C5 - MED SURG/ORTHO    Received request from HealthCare Provider.  Upon review of chart and communication with care team, patient's decision making abilities are not in question.. Patient was/were present in the room during visit.    Goals of ACP Conversation:  Discuss advance care planning documents    Health Care Decision Makers:       Primary Decision Maker: sebastien morales - Healthcare Decision Maker - [phone]    Secondary Decision Maker: PitaGreer - Child - [phone]    Secondary Decision Maker: Joe Morales - Child - [phone]  Summary:  Verified Healthcare Decision Maker  Marina indicated she had completed documentation and her children will make decisions for her if/when needed.     Advance Care Planning Documents (Patient Wishes):  Marina did not desire to discuss ACP documentation further since her family is very supportive and she already has completed documentation.      Assessment:  Marina engaged in conversation about her current health concerns, her family and her overall life. She expressed gratitude for a \"good life\" and supportive family.     Interventions:  Patient DECLINED ACP conversation  Marina declined additional ACP conversation since she already has documentation completed. She said her son, Joe, has the documents.     Care Preferences Communicated:   No    Outcomes/Plan:  ACP Discussion: After initial ACP conversation, Marina declined further conversation since she has completed documentation. She focused the conversation on her health, her family and her overall life. [facility] is available for ongoing support or further ACP conversation as needed.     Electronically signed by Chaplain ROLANDA on 11/16/2024 at 4:01 PM

## 2024-11-16 NOTE — PLAN OF CARE
Neurosurgery Called regarding back pain and compression fractures    NO MRI IMAGING AVAILABLE TO REVIEW  NO RED FLAG SIGNS REPORTED     IN PATIENT CONSULTS ARE NOT SEEN IN PERSON DURING WEEKEND CALL COVERAGE   IF IN PERSON NEUROSURGERY EVALUATION REQUIRED PLEASE TRANSFER PATIENT TO [facility]    Only imaging to review is X-rays        L SPINE  IMPRESSION:  Multiple compression deformities that are likely stable from prior  examination although severe osteopenia limits evaluation.  Consider further  evaluation with CT depending clinical symptomatology.    T SPINE  IMPRESSION:  1. L1 compression fracture with approximately 80% loss of vertebral body  height, slightly worse since the previous MRI on 10/15/2023..  2. Stable T5 and T12 compression fractures.  3. Stable wedging of multiple other thoracic vertebral bodies.  4. Osteopenia.      RECOMMEND  1. PAIN CONTROL  2. TLSO brace  3. T and L MRI  FOR EVALUATION of fracture acuity  4. Upright Xrays in brace    If there are any new fractures could consider kyphoplasty

## 2024-11-17 ENCOUNTER — APPOINTMENT (OUTPATIENT)
Dept: MRI IMAGING | Age: 89
DRG: 543 | End: 2024-11-17
Payer: MEDICARE

## 2024-11-17 PROCEDURE — 6370000000 HC RX 637 (ALT 250 FOR IP): Performed by: INTERNAL MEDICINE

## 2024-11-17 PROCEDURE — 97110 THERAPEUTIC EXERCISES: CPT

## 2024-11-17 PROCEDURE — 51798 US URINE CAPACITY MEASURE: CPT

## 2024-11-17 PROCEDURE — 51702 INSERT TEMP BLADDER CATH: CPT

## 2024-11-17 PROCEDURE — 97530 THERAPEUTIC ACTIVITIES: CPT

## 2024-11-17 PROCEDURE — 97166 OT EVAL MOD COMPLEX 45 MIN: CPT

## 2024-11-17 PROCEDURE — 97116 GAIT TRAINING THERAPY: CPT

## 2024-11-17 PROCEDURE — 72148 MRI LUMBAR SPINE W/O DYE: CPT

## 2024-11-17 PROCEDURE — 97162 PT EVAL MOD COMPLEX 30 MIN: CPT

## 2024-11-17 PROCEDURE — 6360000002 HC RX W HCPCS: Performed by: INTERNAL MEDICINE

## 2024-11-17 PROCEDURE — 2580000003 HC RX 258: Performed by: INTERNAL MEDICINE

## 2024-11-17 PROCEDURE — 1200000000 HC SEMI PRIVATE

## 2024-11-17 PROCEDURE — 72146 MRI CHEST SPINE W/O DYE: CPT

## 2024-11-17 RX ADMIN — LORAZEPAM 0.5 MG: 2 INJECTION INTRAMUSCULAR; INTRAVENOUS at 09:58

## 2024-11-17 RX ADMIN — PROPRANOLOL HYDROCHLORIDE 10 MG: 10 TABLET ORAL at 20:24

## 2024-11-17 RX ADMIN — SODIUM CHLORIDE, POTASSIUM CHLORIDE, SODIUM LACTATE AND CALCIUM CHLORIDE: 600; 310; 30; 20 INJECTION, SOLUTION INTRAVENOUS at 02:59

## 2024-11-17 RX ADMIN — Medication 10 ML: at 20:25

## 2024-11-17 RX ADMIN — Medication 3 MG: at 20:24

## 2024-11-17 RX ADMIN — HYDROCODONE BITARTRATE AND ACETAMINOPHEN 1 TABLET: 5; 325 TABLET ORAL at 05:11

## 2024-11-17 RX ADMIN — URSODIOL 300 MG: 300 CAPSULE ORAL at 20:25

## 2024-11-17 RX ADMIN — APIXABAN 2.5 MG: 2.5 TABLET, FILM COATED ORAL at 20:24

## 2024-11-17 RX ADMIN — OXYCODONE 2.5 MG: 5 TABLET ORAL at 20:27

## 2024-11-17 RX ADMIN — HYDROCODONE BITARTRATE AND ACETAMINOPHEN 1 TABLET: 5; 325 TABLET ORAL at 12:17

## 2024-11-17 RX ADMIN — URSODIOL 300 MG: 300 CAPSULE ORAL at 09:00

## 2024-11-17 RX ADMIN — PROPRANOLOL HYDROCHLORIDE 10 MG: 10 TABLET ORAL at 09:00

## 2024-11-17 RX ADMIN — ACETAMINOPHEN 650 MG: 325 TABLET ORAL at 23:41

## 2024-11-17 RX ADMIN — Medication 1 TABLET: at 09:00

## 2024-11-17 RX ADMIN — APIXABAN 5 MG: 5 TABLET, FILM COATED ORAL at 09:00

## 2024-11-17 RX ADMIN — CITALOPRAM HYDROBROMIDE 20 MG: 20 TABLET ORAL at 20:24

## 2024-11-17 ASSESSMENT — PAIN SCALES - GENERAL
PAINLEVEL_OUTOF10: 4
PAINLEVEL_OUTOF10: 5
PAINLEVEL_OUTOF10: 6
PAINLEVEL_OUTOF10: 7

## 2024-11-17 ASSESSMENT — PAIN DESCRIPTION - LOCATION
LOCATION: BACK

## 2024-11-17 ASSESSMENT — PAIN DESCRIPTION - ORIENTATION
ORIENTATION: LOWER

## 2024-11-17 ASSESSMENT — PAIN DESCRIPTION - DESCRIPTORS
DESCRIPTORS: ACHING
DESCRIPTORS: ACHING

## 2024-11-17 NOTE — PLAN OF CARE
Problem: Pain  Goal: Verbalizes/displays adequate comfort level or baseline comfort level  11/17/2024 1128 by Montserrat Hoyt, RN  Outcome: Progressing  11/16/2024 2219 by Genet Cobb, RN  Outcome: Progressing   Pt encouraged to verbalize pain score. Pain treated with repositioning and scheduled pain meds.

## 2024-11-17 NOTE — PLAN OF CARE
Problem: Discharge Planning  Goal: Discharge to home or other facility with appropriate resources  11/16/2024 2219 by Genet Cobb RN  Outcome: Progressing     Problem: Safety - Adult  Goal: Free from fall injury  11/16/2024 2219 by Genet Cobb RN  Outcome: Progressing     Problem: ABCDS Injury Assessment  Goal: Absence of physical injury  11/16/2024 2219 by Genet Cobb RN  Outcome: Progressing     Problem: Pain  Goal: Verbalizes/displays adequate comfort level or baseline comfort level  Outcome: Progressing     Problem: Neurosensory - Adult  Goal: Achieves stable or improved neurological status  Outcome: Progressing     Problem: Musculoskeletal - Adult  Goal: Return mobility to safest level of function  Outcome: Progressing     Problem: Genitourinary - Adult  Goal: Absence of urinary retention  Outcome: Progressing

## 2024-11-18 VITALS
BODY MASS INDEX: 19.6 KG/M2 | SYSTOLIC BLOOD PRESSURE: 99 MMHG | RESPIRATION RATE: 16 BRPM | OXYGEN SATURATION: 95 % | DIASTOLIC BLOOD PRESSURE: 63 MMHG | HEIGHT: 64 IN | HEART RATE: 72 BPM | WEIGHT: 114.8 LBS | TEMPERATURE: 97.9 F

## 2024-11-18 PROCEDURE — 6370000000 HC RX 637 (ALT 250 FOR IP): Performed by: INTERNAL MEDICINE

## 2024-11-18 PROCEDURE — 2580000003 HC RX 258: Performed by: INTERNAL MEDICINE

## 2024-11-18 PROCEDURE — 51702 INSERT TEMP BLADDER CATH: CPT

## 2024-11-18 RX ORDER — OXYCODONE HYDROCHLORIDE 5 MG/1
2.5 TABLET ORAL EVERY 6 HOURS PRN
Qty: 12 TABLET | Refills: 0 | Status: SHIPPED | OUTPATIENT
Start: 2024-11-18 | End: 2024-11-28

## 2024-11-18 RX ORDER — OXYCODONE HYDROCHLORIDE 5 MG/1
2.5 TABLET ORAL EVERY 6 HOURS PRN
Qty: 12 TABLET | Refills: 0 | Status: SHIPPED | OUTPATIENT
Start: 2024-11-18 | End: 2024-11-18

## 2024-11-18 RX ORDER — LIDOCAINE 4 G/G
1 PATCH TOPICAL DAILY
Qty: 30 EACH | Refills: 0
Start: 2024-11-19

## 2024-11-18 RX ORDER — SENNOSIDES A AND B 8.6 MG/1
1 TABLET, FILM COATED ORAL DAILY
Qty: 10 TABLET | Refills: 0
Start: 2024-11-18 | End: 2024-11-28

## 2024-11-18 RX ADMIN — URSODIOL 300 MG: 300 CAPSULE ORAL at 08:23

## 2024-11-18 RX ADMIN — ACETAMINOPHEN 650 MG: 325 TABLET ORAL at 06:37

## 2024-11-18 RX ADMIN — ACETAMINOPHEN 650 MG: 325 TABLET ORAL at 14:55

## 2024-11-18 RX ADMIN — Medication 10 ML: at 08:24

## 2024-11-18 RX ADMIN — Medication 1 TABLET: at 08:23

## 2024-11-18 RX ADMIN — APIXABAN 2.5 MG: 2.5 TABLET, FILM COATED ORAL at 08:23

## 2024-11-18 ASSESSMENT — PAIN DESCRIPTION - LOCATION
LOCATION: BACK
LOCATION: BACK

## 2024-11-18 ASSESSMENT — PAIN SCALES - GENERAL
PAINLEVEL_OUTOF10: 4
PAINLEVEL_OUTOF10: 4

## 2024-11-18 ASSESSMENT — PAIN DESCRIPTION - ORIENTATION
ORIENTATION: LOWER
ORIENTATION: LOWER

## 2024-11-18 ASSESSMENT — PAIN DESCRIPTION - DESCRIPTORS: DESCRIPTORS: DULL

## 2024-11-18 NOTE — CARE COORDINATION
CASE MANAGEMENT DISCHARGE SUMMARY      Discharge to: [facility]; SNF     Precertification completed: n/a  Hospital Exemption Notification (HENS) completed: yes    IMM given: 11/18/24    New Durable Medical Equipment ordered/agency: defer    Transportation:    Medical Transport explained to pt/family. Pt/family voice no agency preference.        Confirmed discharge plan with:     Patient: yes     Family:  MANJU Sagastume NO answer and VM left    Facility/Agency, name:  Confirmed w/Krist[facility] at [facility]    Phone number for report to facility: [phone]      RN, name: Kasie     Note: Discharging nurse to complete CALEB, reconcile AVS, and place final copy with patient's discharge packet. RN to ensure that written prescriptions for  Level II medications are sent with patient to the facility as per protocol.

## 2024-11-18 NOTE — DISCHARGE INSTR - COC
Facility/ Agency   [facility]; [facility]     / signature: Electronically signed by JOSE BROOKS, RN on 11/18/24 at 1:00 PM EST    PHYSICIAN SECTION    Prognosis: Fair    Condition at Discharge: Stable    Rehab Potential (if transferring to Rehab): Good    Recommended Labs or Other Treatments After Discharge: Follow up with PCP within 1-2 weeks.       Physician Certification: I certify the above information and transfer of Marina Morales  is necessary for the continuing treatment of the diagnosis listed and that she requires Skilled Nursing Facility for less 30 days.     Update Admission H&P: No change in H&P    PHYSICIAN SIGNATURE:  Electronically signed by SENIA MCCRACKEN MD on 11/18/24 at 11:23 AM EST

## 2024-11-18 NOTE — PLAN OF CARE
Problem: Discharge Planning  Goal: Discharge to home or other facility with appropriate resources  11/17/2024 2221 by Genet Cobb RN  Outcome: Progressing     Problem: Safety - Adult  Goal: Free from fall injury  11/17/2024 2221 by Genet Cobb RN  Outcome: Progressing     Problem: ABCDS Injury Assessment  Goal: Absence of physical injury  11/17/2024 2221 by Genet Cobb RN  Outcome: Progressing     Problem: Pain  Goal: Verbalizes/displays adequate comfort level or baseline comfort level  11/17/2024 2221 by Genet Cobb RN  Outcome: Progressing     Problem: Neurosensory - Adult  Goal: Achieves stable or improved neurological status  11/17/2024 2221 by Genet Cobb RN  Outcome: Progressing     Problem: Musculoskeletal - Adult  Goal: Return mobility to safest level of function  11/17/2024 2221 by Genet Cobb RN  Outcome: Progressing     Problem: Genitourinary - Adult  Goal: Absence of urinary retention  11/17/2024 2221 by Genet Cobb RN  Outcome: Progressing

## 2024-11-18 NOTE — DISCHARGE SUMMARY
Discharge Summary    Name:  Marina Morales /Age/Sex: 1935 (89 y.o. female)   Admit Date: 11/15/2024  Discharge Date: 24   MRN & CSN:  [id number] & [id number] Encounter Date and Time 24 11:18 AM EST    Attending:  Senia Kline MD Discharging Provider: SENIA KLINE MD       Hospital Course:       The patient is a pleasant 89 Y F with a h/o HLD, PAF, and distal LLE DVT in 10/2023 who was born in [facility] and moved to the  when she was 20 y/o.  She currently lives in assisted living at [facility].  She has been having more and more low back pain for the last few weeks.  No falls or trauma.  No new weakness, paresthesias, or incontinence.  She saw [facility] as an outpatient and they performed an Xray, reportedly found compression fracture(s), gave her a back brace, referred her to PT, and scheduled an MRI for .  The patient was having too much pain and family worried that she wasn't functioning well at her assisted living so they sent her to the [facility] ED.  MRI's here showed a new L2 fracture with 20% height loss and an old L1 fracture with 80% loss of height.  She was admitted to hospital medicine overnight.        Compression fractures due to osteoporosis.  Cautious analgesia.  Neurosurgery input appreciated.  Patient did well with TLSO brace, pain was bearable, and she didn't want kyphoplasty, which makes sense.  PT/OT rec'd SNF and she was agreeable.      PAF.  Apixaban dose reduced based on age and weight, continued propranolol.    Ruled out any significant urinary retention.  Cotton was placed and only 200 ml drained out initially.  Cotton removed.  She has no symptoms.         Discharge Diagnosis:   Intractable low back pain      Discharge Instructions:     Follow up with PCP within 1-2 weeks.      Diet: ADULT DIET; Regular  Activity: activity as tolerated  Discharged to:  SNF  Condition on discharge: Stable    Objective Findings at Discharge:   BP 99/63   Pulse 72

## 2024-11-18 NOTE — PLAN OF CARE
Problem: Discharge Planning  Goal: Discharge to home or other facility with appropriate resources  11/18/2024 1001 by Kasie Shaver RN  Outcome: Progressing     Problem: Safety - Adult  Goal: Free from fall injury  11/18/2024 1001 by Kasie Shaver RN  Outcome: Progressing     Problem: ABCDS Injury Assessment  Goal: Absence of physical injury  11/18/2024 1001 by Kasie Shaver RN  Outcome: Progressing

## 2024-11-18 NOTE — CARE COORDINATION
Case Management Assessment  Initial Evaluation    Date/Time of Evaluation: 11/18/2024 12:41 PM  Assessment Completed by: JOSE BROOKS RN    If patient is discharged prior to next notation, then this note serves as note for discharge by case management.    Patient Name: Marina Morales                   YOB: 1935  Diagnosis: Intractable low back pain [M54.59]  Acute bilateral low back pain without sciatica [M54.50]  Closed wedge compression fracture of L1 vertebra, initial encounter ([facility]) [S32.010A]                   Date / Time: 11/15/2024  4:14 PM    Patient Admission Status: Inpatient   Readmission Risk (Low < 19, Mod (19-27), High > 27): Readmission Risk Score: 11.4    Current PCP: Kulwant Cabrera APRN - CNP  PCP verified by CM? Yes    Chart Reviewed: Yes      History Provided by: Patient  Patient Orientation: Alert and Oriented    Patient Cognition: Alert    Hospitalization in the last 30 days (Readmission):  No    If yes, Readmission Assessment in  Navigator will be completed.    Advance Directives:      Code Status: Limited   Patient's Primary Decision Maker is: Named in Scanned ACP Document    Primary Decision Maker: sebastien morales - Healthcare Decision Maker - [phone]    Secondary Decision Maker: Greer Lema - Child - [phone]    Secondary Decision Maker: Joe Morales - Child - [phone]    Discharge Planning:    Patient lives with: Alone Type of Home: Independent Living  Primary Care Giver: Self  Patient Support Systems include: Family Members, Friends/Neighbors   Current Financial resources: Medicare  Current community resources: Other (Comment) (IPL)  Current services prior to admission: Durable Medical Equipment            Current DME: Wheelchair, Other (Comment) (ROLATOR)            Type of Home Care services:  None    ADLS  Prior functional level: Independent in ADLs/IADLs  Current functional level: Assistance with the following:, Bathing, Dressing, Toileting, Cooking,

## 2024-11-18 NOTE — PROGRESS NOTES
Occupational & Physical Therapy    Chart reviewed, attempted to see pt for OT & PT eval  this date;  per RN hold for CT & MRI of back & no TLSO brace available.    Marcia Robert,OT  SHONDA MahoneyT  
  Hospital Medicine Progress Note      Subjective:  She is still having back pain, very positional.  Heading off to MRI.      General appearance: No apparent distress, appears stated age and cooperative.  HEENT: Pupils equal, round.  Conjunctivae/corneas clear.  Neck: No jugular venous distention.   Respiratory:  Normal respiratory effort.  Bilaterally without Rales/Wheezes/Rhonchi.  Cardiovascular: Normal rate and regular rhythm with normal S1/S2 without murmurs, rubs or gallops.  Abdomen: Soft, non-tender, non-distended with normal bowel sounds.  Musculoskeletal: No edema.  Without deformity.  Skin: No jaundice.  No rashes or lesions.  Neurologic:  Neurovascularly intact without any focal sensory/motor deficits. Cranial nerves: II-XII intact.  Psychiatric: Alert and oriented, normal insight.      Presenting Admission History:  The patient is a pleasant 89 Y F with a h/o HLD, PAF, and distal LLE DVT in 10/2023 who was born in [facility] and moved to the  when she was 20 y/o.  She currently lives in assisted living at [facility].  She has been having more and more low back pain for the last few weeks.  No falls or trauma.  No new weakness, paresthesias, or incontinence.  She saw [facility] as an outpatient and they performed an Xray, reportedly found compression fracture(s), gave her a back brace, referred her to PT, and scheduled an MRI for 11/19.  The patient was having too much pain and family worried that she wasn't functioning well at her assisted living so they sent her to the [facility] ED.  Xrays here showed a new L1 fracture with 80% loss of height, also old T5 and T12 fractures.  She was admitted to hospital medicine overnight.      Assessment/Plan:        Compression fractures due to osteoporosis.  Cautious analgesia.  F/u MRI of T and L spine.  Neurosurgery input appreciated.  PT/OT rec'd SNF.    PAF.  Apixaban dose reduced based on age and weight, continued propranolol.      DVT Prophylaxis: 
  Hospital Medicine Progress Note      Subjective:  She is still having back pain.  Better when resting.  She hasn't tried to get up yet.        General appearance: No apparent distress, appears stated age and cooperative.  HEENT: Pupils equal, round.  Conjunctivae/corneas clear.  Neck: No jugular venous distention.   Respiratory:  Normal respiratory effort.  Bilaterally without Rales/Wheezes/Rhonchi.  Cardiovascular: Normal rate and regular rhythm with normal S1/S2 without murmurs, rubs or gallops.  Abdomen: Soft, non-tender, non-distended with normal bowel sounds.  Musculoskeletal: No edema.  Without deformity.  Skin: No jaundice.  No rashes or lesions.  Neurologic:  Neurovascularly intact without any focal sensory/motor deficits. Cranial nerves: II-XII intact.  Psychiatric: Alert and oriented, normal insight.      Presenting Admission History:  The patient is a pleasant 89 Y F with a h/o HLD, PAF, and distal LLE DVT in 10/2023 who was born in [facility] and moved to the  when she was 20 y/o.  She currently lives in assisted living at [facility].  She has been having more and more low back pain for the last few weeks.  No falls or trauma.  No new weakness, paresthesias, or incontinence.  She saw [facility] as an outpatient and they performed an Xray, reportedly found compression fracture(s), gave her a back brace, referred her to PT, and scheduled an MRI for 11/19.  The patient was having too much pain and family worried that she wasn't functioning well at her assisted living so they sent her to the [facility] ED.  Xrays here showed a new L1 fracture with 80% loss of height, also old T5 and T12 fractures.  She was admitted to hospital medicine overnight.      Assessment/Plan:        Compression fractures due to osteoporosis.  Cautious analgesia.  F/u MRI of T and L spine.  Will involve neurosurgery after MRI results.  I asked her to have someone bring her two previous back braces from home.  PT/OT.    PAF.  
  Physician Progress Note      PATIENT:               STAN DUMONT  CSN #:                  [id number]  :                       1935  ADMIT DATE:       11/15/2024 4:14 PM  DISCH DATE:  RESPONDING  PROVIDER #:        Vel Kline MD          QUERY TEXT:    Patient admitted with compression fracture, noted to have paroxysmal atrial   fibrillation and is maintained on Eliquis. If possible, please document in   progress notes and discharge summary if you are evaluating and/or treating any   of the following:?  ?  The medical record reflects the following:  Risk Factors: 89 year old female    Clinical Indicators:  PN- \"PAF.  Apixaban, propranolol.\"    Treatment: Eliquis  Options provided:  -- Secondary hypercoagulable state in a patient with atrial fibrillation  -- Other - I will add my own diagnosis  -- Disagree - Not applicable / Not valid  -- Disagree - Clinically unable to determine / Unknown  -- Refer to Clinical Documentation Reviewer    PROVIDER RESPONSE TEXT:    Provider disagreed with this query.  disagree    Query created by: Kaylee Hernandez on 2024 8:35 AM      Electronically signed by:  Vel Kline MD 2024 11:38 AM          
4 Eyes Skin Assessment     NAME:  Marina Morales  YOB: 1935  MEDICAL RECORD NUMBER:  [id number]    The patient is being assessed for  Admission    I agree that at least one RN has performed a thorough Head to Toe Skin Assessment on the patient. ALL assessment sites listed below have been assessed.      Areas assessed by both nurses:    Head, Face, Ears, Shoulders, Back, Chest, Arms, Elbows, Hands, Sacrum. Buttock, Coccyx, Ischium, Legs. Feet and Heels, and Under Medical Devices         Does the Patient have a Wound? No noted wound(s)       Jorge Prevention initiated by RN: No  Wound Care Orders initiated by RN: No    Pressure Injury (Stage 3,4, Unstageable, DTI, NWPT, and Complex wounds) if present, place Wound referral order by RN under : No    New Ostomies, if present place, Ostomy referral order under : No     Nurse 1 eSignature: Electronically signed by Raman Gallegos RN on 11/16/24 at 3:24 AM EST    **SHARE this note so that the co-signing nurse can place an eSignature**    Nurse 2 eSignature: Electronically signed by Uma Chau RN on 11/16/24 at 5:23 AM EST   
Occupational Therapy  Facility/Department: [facility] C5 - MED SURG/ORTHO  Occupational Therapy Initial Assessment    Name: Marina Morales  : 1935  MRN: [id number]  Date of Service: 2024    Discharge Recommendations:  Subacute/Skilled Nursing Facility  Therapy discharge recommendations take into account each patient's current medical complexities and are subject to input/oversight from the patient's healthcare team.   Barriers to Home Discharge:     [x] Unable to transfer, ambulate, or propel wheelchair household distances without assist   [x] Limited available assist at home upon discharge        If pt is unable to be seen after this session, please let this note serve as discharge summary.  Please see case management note for discharge disposition.  Thank you.           Patient Diagnosis(es): The primary encounter diagnosis was Closed wedge compression fracture of L1 vertebra, initial encounter ([facility]). A diagnosis of Acute bilateral low back pain without sciatica was also pertinent to this visit.  Past Medical History:  has a past medical history of Acute deep vein thrombosis (DVT) of calf muscle vein of left lower extremity ([facility]), Arthritis, Cataract, H/O colonoscopy, Hyperlipidemia, and Osteoporosis.  Past Surgical History:  has a past surgical history that includes Cataract removal; Colonoscopy; hip surgery (Left, 2023); and sigmoidoscopy (N/A, 2023).           Assessment  Performance deficits / Impairments: Decreased functional mobility ;Decreased balance;Decreased ADL status  Assessment: pt from Independent Living @ [facility], reports normally independent with BADL's & functional mobility with  w/c progressing to 4 WW; admitted for intractable back pain, with h/o falls even with TLSO; now requiring mod assist of 2 bed mobility with re: log roll techniques, dependent with LE self care, min/mod assist with functional mobility;pt cooperative & to benefit from skilled OT services  REQUIRES OT 
Patient sent with all belongings and discharge paperwork. Report called to Kenia at the [facility]. IV removed without complications.   
technique.  Short Term Goal 2: Pt will demonstrate SBA with functional transfers with LRAD.  Short Term Goal 3: Pt will demonstrate CGA for ambulation up to 25' with LRAD.  Short Term Goal 4: Pt will participate in 4 different BLE exercises of 12-15 reps each to improve strength and endurance by 11/22.  Patient Goals   Patient Goals : \"I want to go home and get therapy so I can move better.\"     Education  Patient Education  Education Given To: Patient;Family  Education Provided: Role of Therapy;Plan of Care;Equipment;Precautions;Fall Prevention Strategies;Transfer Training  Education Provided Comments: educated pt on benefits of time spent OOB, use of TLSO brace, log roll technique for bed mobiltiy, safety in hospital, use of call light.  Education Method: Demonstration  Barriers to Learning: None  Education Outcome: Verbalized understanding;Continued education needed      Therapy Time   Individual Concurrent Group Co-treatment   Time In       1302   Time Out       1345   Minutes       43   Timed Code Treatment Minutes: 28 Minutes, 15 minutes for initial evaluation.       Jhonny Quiroga, PT, DPT  If pt is unable to be seen after this session, please let this note serve as discharge summary.  Please see case management note for discharge disposition.  Thank you.

## 2024-11-20 LAB
BACTERIA BLD CULT ORG #2: NORMAL
BACTERIA BLD CULT: NORMAL

## 2024-12-30 ENCOUNTER — TELEPHONE (OUTPATIENT)
Dept: ADMINISTRATIVE | Age: 88
End: 2024-12-30

## 2024-12-30 NOTE — TELEPHONE ENCOUNTER
I received a PA for hydrOXYzine HCl 25MG tablets, Key: [id number]. I tried processing it and it came back No eligibility was found. Please reach out to patient for NEW PRESCRIPTION coverage.     Thank you

## 2025-01-15 ENCOUNTER — HOSPITAL ENCOUNTER (OUTPATIENT)
Dept: WOMENS IMAGING | Age: 89
Discharge: HOME | End: 2025-01-15
Payer: MEDICARE

## 2025-01-15 DIAGNOSIS — S22.000A COMPRESSION OF THORACIC VERTEBRA, CLOSED, INITIAL ENCOUNTER (HCC): ICD-10-CM

## 2025-01-15 PROCEDURE — 77080 DXA BONE DENSITY AXIAL: CPT

## 2025-01-16 RX ORDER — POTASSIUM CHLORIDE 1500 MG/1
20 TABLET, EXTENDED RELEASE ORAL DAILY
Qty: 30 TABLET | Refills: 3 | Status: SHIPPED | OUTPATIENT
Start: 2025-01-16

## 2025-01-16 NOTE — TELEPHONE ENCOUNTER
Last Office Visit  -  11/6/24  Next Office Visit  -  1/22/25    Last Filled  -    Last UDS -    Contract -

## 2025-02-17 NOTE — TELEPHONE ENCOUNTER
Call #1    Called and Left message for patient to call back about the current dose of Eliquis the patient is taking.

## 2025-02-17 NOTE — TELEPHONE ENCOUNTER
LAST REFILL 11/18/2024  AMOUNT 60     5 REFILLS  LAST VISIT 11/6/2024  NEXT VISIT No future appointments.

## 2025-02-17 NOTE — TELEPHONE ENCOUNTER
Please tell them we are going to go back to 2.5mg twice a day and maintain that dosage for future.

## 2025-02-17 NOTE — TELEPHONE ENCOUNTER
Patient's granddaughter called back and said that Kulwant had prescribed the 5 mg of Eliquis, and the rehab facility was giving her 2.5 mg.  Patient is currently taking 5mg of the Eliquis.

## 2025-03-03 ENCOUNTER — APPOINTMENT (OUTPATIENT)
Dept: CT IMAGING | Age: 89
End: 2025-03-03
Payer: MEDICARE

## 2025-03-03 ENCOUNTER — HOSPITAL ENCOUNTER (EMERGENCY)
Age: 89
Discharge: HOME | End: 2025-03-03
Attending: EMERGENCY MEDICINE
Payer: MEDICARE

## 2025-03-03 VITALS
HEART RATE: 81 BPM | OXYGEN SATURATION: 95 % | SYSTOLIC BLOOD PRESSURE: 95 MMHG | TEMPERATURE: 98.3 F | DIASTOLIC BLOOD PRESSURE: 57 MMHG | WEIGHT: 120 LBS | HEIGHT: 64 IN | BODY MASS INDEX: 20.49 KG/M2 | RESPIRATION RATE: 16 BRPM

## 2025-03-03 DIAGNOSIS — M48.54XS NONTRAUMATIC COMPRESSION FRACTURE OF T12 VERTEBRA, SEQUELA: ICD-10-CM

## 2025-03-03 DIAGNOSIS — R07.9 RIGHT-SIDED CHEST PAIN: Primary | ICD-10-CM

## 2025-03-03 PROCEDURE — 99284 EMERGENCY DEPT VISIT MOD MDM: CPT

## 2025-03-03 PROCEDURE — 71250 CT THORAX DX C-: CPT

## 2025-03-03 PROCEDURE — 6370000000 HC RX 637 (ALT 250 FOR IP): Performed by: EMERGENCY MEDICINE

## 2025-03-03 PROCEDURE — 93005 ELECTROCARDIOGRAM TRACING: CPT | Performed by: EMERGENCY MEDICINE

## 2025-03-03 RX ORDER — HYDROCODONE BITARTRATE AND ACETAMINOPHEN 5; 325 MG/1; MG/1
0.5 TABLET ORAL EVERY 8 HOURS PRN
Qty: 5 TABLET | Refills: 0 | Status: SHIPPED | OUTPATIENT
Start: 2025-03-03 | End: 2025-03-05 | Stop reason: SDUPTHER

## 2025-03-03 RX ORDER — LIDOCAINE 4 G/G
1 PATCH TOPICAL ONCE
Status: DISCONTINUED | OUTPATIENT
Start: 2025-03-03 | End: 2025-03-04 | Stop reason: HOSPADM

## 2025-03-03 RX ORDER — HYDROCODONE BITARTRATE AND ACETAMINOPHEN 5; 325 MG/1; MG/1
0.5 TABLET ORAL ONCE
Status: COMPLETED | OUTPATIENT
Start: 2025-03-03 | End: 2025-03-03

## 2025-03-03 RX ORDER — IBUPROFEN 400 MG/1
400 TABLET, FILM COATED ORAL ONCE
Status: COMPLETED | OUTPATIENT
Start: 2025-03-03 | End: 2025-03-03

## 2025-03-03 RX ADMIN — HYDROCODONE BITARTRATE AND ACETAMINOPHEN 0.5 TABLET: 5; 325 TABLET ORAL at 18:27

## 2025-03-03 RX ADMIN — IBUPROFEN 400 MG: 400 TABLET, FILM COATED ORAL at 18:27

## 2025-03-03 ASSESSMENT — PAIN SCALES - GENERAL
PAINLEVEL_OUTOF10: 7
PAINLEVEL_OUTOF10: 5
PAINLEVEL_OUTOF10: 7

## 2025-03-03 ASSESSMENT — PAIN DESCRIPTION - ORIENTATION: ORIENTATION: RIGHT

## 2025-03-03 ASSESSMENT — PAIN DESCRIPTION - LOCATION: LOCATION: RIB CAGE

## 2025-03-03 ASSESSMENT — PAIN - FUNCTIONAL ASSESSMENT: PAIN_FUNCTIONAL_ASSESSMENT: 0-10

## 2025-03-03 NOTE — ED PROVIDER NOTES
[facility] EMERGENCY DEPARTMENT  EMERGENCY DEPARTMENT ENCOUNTER        Pt Name: Marina Morales  MRN: [id number]  Birthdate 7/24/1935  Date of evaluation: 3/3/2025  Provider: Rob Paredes MD  PCP: Kulwant Cabrera APRN - CNP      CHIEF COMPLAINT       Chief Complaint   Patient presents with    Rib Injury     Was bending over Saturday to put on a pair of shoes and felt a pop in right rib cage. Pain started overnight last night       HISTORY OFPRESENT ILLNESS   (Location/Symptom, Timing/Onset, Context/Setting, Quality, Duration, Modifying Factors,Severity)  Note limiting factors.     Marina Morales is a 89 y.o. female presenting today due to concern for a pop in her right rib cage last night but initially feeling it on Saturday when she was bending over to put on a pair of shoes but it got worse last night.  She lives at the [facility].  She states that she can press on a spot on her chest and it hurts exactly at that spot.  She denies feeling like it is any deeper.  No radiation of the pain to the back or into the arm or neck.  She denies feeling short of breath.  She has a prior history of a DVT but is on Eliquis and denies missing any doses.  She denies any actual falls or trauma.  She denies any abdominal pain or vomiting.  Certain movements makes the pain worse.  No numbness or weakness in the arms or legs.  She does report having some bilateral shoulder soreness but has been doing a lot of therapy recently and is not worried about this today.  She does report taking Norco in the past without any difficulty although has had issues with morphine before.  Her grandson is at the bedside with her and he is a pharmacy technician.  No reported fever.  Due to concern for significant right-sided chest discomfort and possible fracture, she came to the emergency department for further evaluation.                Review of Systems:     Positives and Pertinent negatives as per HPI.      PASTMEDICAL HISTORY     Past Medical

## 2025-03-04 LAB
EKG ATRIAL RATE: 75 BPM
EKG DIAGNOSIS: NORMAL
EKG P AXIS: 74 DEGREES
EKG P-R INTERVAL: 170 MS
EKG Q-T INTERVAL: 398 MS
EKG QRS DURATION: 88 MS
EKG QTC CALCULATION (BAZETT): 444 MS
EKG R AXIS: -33 DEGREES
EKG T AXIS: 85 DEGREES
EKG VENTRICULAR RATE: 75 BPM

## 2025-03-04 PROCEDURE — 93010 ELECTROCARDIOGRAM REPORT: CPT | Performed by: INTERNAL MEDICINE

## 2025-03-04 NOTE — DISCHARGE INSTRUCTIONS
Take Tylenol as needed for pain.  Try over-the-counter Lidoderm patches.  Take half a tablet of Norco for breakthrough pain but be very careful when walking on this and understand it can cause constipation.  Do not take more than 3 g of Tylenol per day.  Use incentive spirometer a few times a day over the next week to avoid pneumonia.    Follow-up with your primary doctor in the next 2 to 3 days for any other concerns.    Return to the emergency department for any worsening chest pain with new shortness of breath, trouble breathing, new abdominal pain with vomiting, trouble walking, lightheadedness, or any other concerns over the next 6 to 24 hours.

## 2025-03-04 NOTE — ED NOTES
Report given to [facility]. RN called [facility] and gave them report as well. Pt now on the way back to facility.

## 2025-03-05 ENCOUNTER — OFFICE VISIT (OUTPATIENT)
Dept: INTERNAL MEDICINE CLINIC | Age: 89
End: 2025-03-05
Payer: MEDICARE

## 2025-03-05 VITALS
BODY MASS INDEX: 19.74 KG/M2 | WEIGHT: 115 LBS | DIASTOLIC BLOOD PRESSURE: 62 MMHG | SYSTOLIC BLOOD PRESSURE: 100 MMHG | HEART RATE: 75 BPM | OXYGEN SATURATION: 94 % | TEMPERATURE: 97.1 F

## 2025-03-05 DIAGNOSIS — R07.9 RIGHT-SIDED CHEST PAIN: ICD-10-CM

## 2025-03-05 DIAGNOSIS — R21 RASH: Primary | ICD-10-CM

## 2025-03-05 PROCEDURE — 1123F ACP DISCUSS/DSCN MKR DOCD: CPT | Performed by: NURSE PRACTITIONER

## 2025-03-05 PROCEDURE — G8420 CALC BMI NORM PARAMETERS: HCPCS | Performed by: NURSE PRACTITIONER

## 2025-03-05 PROCEDURE — 99213 OFFICE O/P EST LOW 20 MIN: CPT | Performed by: NURSE PRACTITIONER

## 2025-03-05 PROCEDURE — 1090F PRES/ABSN URINE INCON ASSESS: CPT | Performed by: NURSE PRACTITIONER

## 2025-03-05 PROCEDURE — G8427 DOCREV CUR MEDS BY ELIG CLIN: HCPCS | Performed by: NURSE PRACTITIONER

## 2025-03-05 PROCEDURE — 1036F TOBACCO NON-USER: CPT | Performed by: NURSE PRACTITIONER

## 2025-03-05 RX ORDER — HYDROCODONE BITARTRATE AND ACETAMINOPHEN 5; 325 MG/1; MG/1
.5-1 TABLET ORAL EVERY 8 HOURS PRN
Qty: 9 TABLET | Refills: 0 | Status: SHIPPED | OUTPATIENT
Start: 2025-03-05 | End: 2025-03-08

## 2025-03-05 ASSESSMENT — PATIENT HEALTH QUESTIONNAIRE - PHQ9
10. IF YOU CHECKED OFF ANY PROBLEMS, HOW DIFFICULT HAVE THESE PROBLEMS MADE IT FOR YOU TO DO YOUR WORK, TAKE CARE OF THINGS AT HOME, OR GET ALONG WITH OTHER PEOPLE: NOT DIFFICULT AT ALL
2. FEELING DOWN, DEPRESSED OR HOPELESS: NOT AT ALL
1. LITTLE INTEREST OR PLEASURE IN DOING THINGS: NOT AT ALL
5. POOR APPETITE OR OVEREATING: NOT AT ALL
7. TROUBLE CONCENTRATING ON THINGS, SUCH AS READING THE NEWSPAPER OR WATCHING TELEVISION: SEVERAL DAYS
SUM OF ALL RESPONSES TO PHQ QUESTIONS 1-9: 4
8. MOVING OR SPEAKING SO SLOWLY THAT OTHER PEOPLE COULD HAVE NOTICED. OR THE OPPOSITE, BEING SO FIGETY OR RESTLESS THAT YOU HAVE BEEN MOVING AROUND A LOT MORE THAN USUAL: NOT AT ALL
4. FEELING TIRED OR HAVING LITTLE ENERGY: MORE THAN HALF THE DAYS
SUM OF ALL RESPONSES TO PHQ QUESTIONS 1-9: 4
3. TROUBLE FALLING OR STAYING ASLEEP: SEVERAL DAYS
9. THOUGHTS THAT YOU WOULD BE BETTER OFF DEAD, OR OF HURTING YOURSELF: NOT AT ALL
SUM OF ALL RESPONSES TO PHQ QUESTIONS 1-9: 4
6. FEELING BAD ABOUT YOURSELF - OR THAT YOU ARE A FAILURE OR HAVE LET YOURSELF OR YOUR FAMILY DOWN: NOT AT ALL
SUM OF ALL RESPONSES TO PHQ QUESTIONS 1-9: 4

## 2025-03-05 NOTE — PROGRESS NOTES
Marina Morales  7/24/1935        Chief Complaint   Patient presents with    ER f/u        Assessment/Plan:     1. Right-sided chest pain  CSM reviewed.   Use sparingly.   If symptoms persist. Consider orthopedic evaluation    - HYDROcodone-acetaminophen (NORCO) 5-325 MG per tablet; Take 0.5-1 tablets by mouth every 8 hours as needed for Pain (breakthrough pain, do not drive with this and be careful when walking with this) for up to 3 days. Intended supply: 3 days. Take lowest dose possible to manage pain Max Daily Amount: 3 tablets  Dispense: 9 tablet; Refill: 0    2. Rash  Use cortisone cream PRN. Aquaphor      Return if symptoms worsen or fail to improve.      HPI:      Patient f/u ER visit regarding lumbar back pain, hx compression fx.   R sided chest pressure/pain evaluated at ER. Normal exam.   Symptoms have stabilized.   She does have itching and mild rash that is around the trunk. Denies falls.   Continues to have lumbar pain at nighttime.     /62 (BP Site: Left Upper Arm, Patient Position: Sitting, BP Cuff Size: Medium Adult)   Pulse 75   Temp 97.1 °F (36.2 °C) (Temporal)   Wt 52.2 kg (115 lb)   SpO2 94%   BMI 19.74 kg/m²     Prior to Visit Medications    Medication Sig Taking? Authorizing Provider   apixaban (ELIQUIS) 2.5 MG TABS tablet Take 1 tablet by mouth 2 times daily  Kulwant Cabrera APRN - CNP   potassium chloride (K-TAB) 20 MEQ TBCR extended release tablet TAKE ONE TABLET BY MOUTH DAILY  Kulwant Cabrera APRN - CNP   lidocaine 4 % external patch Place 1 patch onto the skin daily  Braeden Kline MD   propranolol (INDERAL) 10 MG tablet TAKE ONE TABLET BY MOUTH TWO TIMES DAILY  Kulwant Cabrera APRN - CNP   ursodiol (ACTIGALL) 300 MG capsule Take 250 mg by mouth 2 times daily Two 250 mg tabs in a.m. and One 250 tab at night  Provider, MD Sushil   potassium chloride (KLOR-CON M) 20 MEQ extended release tablet Take 1 tablet by mouth daily  Kulwant Cabrera APRN - CNP   calcium carbonate

## 2025-03-10 ASSESSMENT — ENCOUNTER SYMPTOMS
SORE THROAT: 0
FACIAL SWELLING: 0
DIARRHEA: 0
NAUSEA: 0
COUGH: 0
SINUS PRESSURE: 0
VOMITING: 0

## 2025-03-13 ENCOUNTER — TELEPHONE (OUTPATIENT)
Dept: INTERNAL MEDICINE CLINIC | Age: 89
End: 2025-03-13

## 2025-03-13 NOTE — TELEPHONE ENCOUNTER
If it's not too much trouble, I would like her to be seen. Dr Eller is aníbal and could likely evaluate tomorrow if she can. Then we can decide next best treatment plan.

## 2025-03-13 NOTE — TELEPHONE ENCOUNTER
Patient's granddaughter is calling and states that she is starting to have dizzy spells. Wants to know if her PCP needs to see her or does she need to see ENT? Been having the dizzy spells since Monday evening and had another one on Wednesday evening. She seems to do okay after she takes the Meclizine.         Arabella    [phone]

## 2025-03-14 ENCOUNTER — OFFICE VISIT (OUTPATIENT)
Dept: INTERNAL MEDICINE CLINIC | Age: 89
End: 2025-03-14
Payer: MEDICARE

## 2025-03-14 VITALS
OXYGEN SATURATION: 97 % | TEMPERATURE: 97 F | WEIGHT: 116 LBS | SYSTOLIC BLOOD PRESSURE: 103 MMHG | HEART RATE: 61 BPM | DIASTOLIC BLOOD PRESSURE: 65 MMHG | BODY MASS INDEX: 19.91 KG/M2

## 2025-03-14 DIAGNOSIS — R42 DIZZINESS: Primary | ICD-10-CM

## 2025-03-14 PROCEDURE — 99213 OFFICE O/P EST LOW 20 MIN: CPT

## 2025-03-14 PROCEDURE — G8420 CALC BMI NORM PARAMETERS: HCPCS

## 2025-03-14 PROCEDURE — 1090F PRES/ABSN URINE INCON ASSESS: CPT

## 2025-03-14 PROCEDURE — 1123F ACP DISCUSS/DSCN MKR DOCD: CPT

## 2025-03-14 PROCEDURE — G8427 DOCREV CUR MEDS BY ELIG CLIN: HCPCS

## 2025-03-14 PROCEDURE — 1159F MED LIST DOCD IN RCRD: CPT

## 2025-03-14 PROCEDURE — 1036F TOBACCO NON-USER: CPT

## 2025-03-14 RX ORDER — MECLIZINE HYDROCHLORIDE 25 MG/1
25 TABLET ORAL 3 TIMES DAILY PRN
COMMUNITY
End: 2025-03-14

## 2025-03-14 RX ORDER — MECLIZINE HCL 12.5 MG 12.5 MG/1
12.5 TABLET ORAL 3 TIMES DAILY PRN
Qty: 30 TABLET | Refills: 0 | Status: SHIPPED | OUTPATIENT
Start: 2025-03-14 | End: 2025-03-24

## 2025-03-14 NOTE — PROGRESS NOTES
[facility] Office  [address]  [address]  [place], [place] [address]  Tel: [phone]    Marina Morales  7/24/1935  female    CC:   Chief Complaint   Patient presents with    Dizziness       HPI:   Patient is a 89-year-old female who presents today for an acute visit for dizziness.    States that on Monday morning she woke up and had episode of vertigo with the room spinning.  States that lasted about 1 or 2 minutes, then resolved spontaneously.  Had to lay in bed most of the day, due to fear of reproducing her symptoms.  Her granddaughter to come visit her in the evening, gave her a dose of her own meclizine, which patient states gave her some relief of symptoms.  Was able to stand up and do the dishes.    Had another episode of dizziness Tuesday night as well as once a night.  States she does notice them at times when she turns her head, but has a hard time reproducing her symptoms.    Has been fearful of falling, decreased amount of her daily walking due to this fear.  Denies any focal neurologic deficits.    Poor vision at baseline, no acute worsening.  Has an essential tremor at baseline, treated with propranolol no changes in the symptoms.    Past Medical History:   Diagnosis Date    Acute deep vein thrombosis (DVT) of calf muscle vein of left lower extremity (HCC) 10/12/2023    Arthritis     Cataract     H/O colonoscopy     Hyperlipidemia     Osteoporosis        Past Surgical History:   Procedure Laterality Date    CATARACT REMOVAL      COLONOSCOPY      HIP SURGERY Left 5/14/2023    LEFT HIP OPEN REDUCTION INTERNAL FIXATION INTRAMEDULLARY NAILING performed by Grady Irwin DO at [facility] OR    SIGMOIDOSCOPY N/A 11/13/2023    SIGMOIDOSCOPY BIOPSY FLEXIBLE performed by Candido Luo DO at [facility] ENDOSCOPY       Family History   Problem Relation Age of Onset    Heart Defect Mother     Prostate Cancer Father         Allergies   Allergen Reactions    Morphine Hallucinations    Tramadol

## 2025-03-14 NOTE — PATIENT INSTRUCTIONS
-We will give you a handout about the Epley maneuver which you can try to do at home.  This may help if your inner ear is contributing to your episodes of vertigo  -A short prescription of meclizine to be used 3 times a day as needed for your dizziness/vertigo  -Be aware that you do have a history of transient atrial fibrillation.  I do not think that this is currently contributing to your symptoms, but if you find that you are having palpitations/racing heart that this could contribute to dizziness  -Please follow-up in 3 to 4 weeks if you are not having significant improvement.  May benefit from vestibular rehab versus further investigation with MRI of the brain

## 2025-03-17 NOTE — TELEPHONE ENCOUNTER
Refill request for Citalopram medication.     Name of Pharmacy- [facility]      Last visit - 03/14/2025     Pending visit - 04/21/2025    Last refill -11/14/2023

## 2025-03-19 NOTE — PROGRESS NOTES
Error Identified pt with two pt identifiers (name and ). Reviewed chart in preparation for visit and have obtained necessary documentation.    Dena Arellano is a 70 y.o. female  Chief Complaint   Patient presents with    Weight Management     /66 (BP Site: Right Upper Arm, Patient Position: Sitting, BP Cuff Size: Large Adult) Comment: manual  Pulse 71   Resp 20   Ht 1.753 m (5' 9\")   Wt 107.1 kg (236 lb 1.6 oz)   LMP  (LMP Unknown)   BMI 34.87 kg/m²     1. Have you been to the ER, urgent care clinic since your last visit?  Hospitalized since your last visit?no    2. Have you seen or consulted any other health care providers outside of the [facility] since your last visit?  Include any pap smears or colon screening. No    Patient entered their homework via the [employer] mona.

## 2025-03-20 ENCOUNTER — TELEPHONE (OUTPATIENT)
Dept: INTERNAL MEDICINE CLINIC | Age: 89
End: 2025-03-20

## 2025-03-21 ENCOUNTER — PATIENT MESSAGE (OUTPATIENT)
Dept: INTERNAL MEDICINE CLINIC | Age: 89
End: 2025-03-21

## 2025-03-24 RX ORDER — WHEELCHAIR
EACH MISCELLANEOUS
Qty: 1 EACH | Refills: 0 | Status: SHIPPED | OUTPATIENT
Start: 2025-03-24

## 2025-03-27 NOTE — TELEPHONE ENCOUNTER
Documented on another encounter. Grandson MPA is unsure if they want it mailed or . It was placed in the file at .

## 2025-04-10 RX ORDER — CITALOPRAM HYDROBROMIDE 10 MG/1
TABLET ORAL
Qty: 180 TABLET | Refills: 1 | OUTPATIENT
Start: 2025-04-10

## 2025-04-21 ENCOUNTER — OFFICE VISIT (OUTPATIENT)
Dept: INTERNAL MEDICINE CLINIC | Age: 89
End: 2025-04-21
Payer: MEDICARE

## 2025-04-21 VITALS
HEIGHT: 64 IN | HEART RATE: 99 BPM | OXYGEN SATURATION: 95 % | WEIGHT: 119 LBS | BODY MASS INDEX: 20.32 KG/M2 | DIASTOLIC BLOOD PRESSURE: 71 MMHG | SYSTOLIC BLOOD PRESSURE: 102 MMHG

## 2025-04-21 DIAGNOSIS — Z00.00 INITIAL MEDICARE ANNUAL WELLNESS VISIT: Primary | ICD-10-CM

## 2025-04-21 DIAGNOSIS — M81.0 AGE-RELATED OSTEOPOROSIS WITHOUT CURRENT PATHOLOGICAL FRACTURE: ICD-10-CM

## 2025-04-21 DIAGNOSIS — R42 VERTIGO: ICD-10-CM

## 2025-04-21 DIAGNOSIS — I48.0 PAROXYSMAL ATRIAL FIBRILLATION (HCC): ICD-10-CM

## 2025-04-21 DIAGNOSIS — I27.20 PULMONARY HYPERTENSION, UNSPECIFIED (HCC): ICD-10-CM

## 2025-04-21 PROCEDURE — 1159F MED LIST DOCD IN RCRD: CPT | Performed by: NURSE PRACTITIONER

## 2025-04-21 PROCEDURE — 1123F ACP DISCUSS/DSCN MKR DOCD: CPT | Performed by: NURSE PRACTITIONER

## 2025-04-21 PROCEDURE — 1160F RVW MEDS BY RX/DR IN RCRD: CPT | Performed by: NURSE PRACTITIONER

## 2025-04-21 PROCEDURE — G0438 PPPS, INITIAL VISIT: HCPCS | Performed by: NURSE PRACTITIONER

## 2025-04-21 RX ORDER — CITALOPRAM HYDROBROMIDE 20 MG/1
20 TABLET ORAL
Qty: 90 TABLET | Refills: 3 | Status: SHIPPED | OUTPATIENT
Start: 2025-04-21

## 2025-04-21 ASSESSMENT — PATIENT HEALTH QUESTIONNAIRE - PHQ9
SUM OF ALL RESPONSES TO PHQ QUESTIONS 1-9: 1
10. IF YOU CHECKED OFF ANY PROBLEMS, HOW DIFFICULT HAVE THESE PROBLEMS MADE IT FOR YOU TO DO YOUR WORK, TAKE CARE OF THINGS AT HOME, OR GET ALONG WITH OTHER PEOPLE: NOT DIFFICULT AT ALL
5. POOR APPETITE OR OVEREATING: NOT AT ALL
7. TROUBLE CONCENTRATING ON THINGS, SUCH AS READING THE NEWSPAPER OR WATCHING TELEVISION: NOT AT ALL
4. FEELING TIRED OR HAVING LITTLE ENERGY: SEVERAL DAYS
1. LITTLE INTEREST OR PLEASURE IN DOING THINGS: NOT AT ALL
SUM OF ALL RESPONSES TO PHQ QUESTIONS 1-9: 1
9. THOUGHTS THAT YOU WOULD BE BETTER OFF DEAD, OR OF HURTING YOURSELF: NOT AT ALL
SUM OF ALL RESPONSES TO PHQ QUESTIONS 1-9: 1
8. MOVING OR SPEAKING SO SLOWLY THAT OTHER PEOPLE COULD HAVE NOTICED. OR THE OPPOSITE, BEING SO FIGETY OR RESTLESS THAT YOU HAVE BEEN MOVING AROUND A LOT MORE THAN USUAL: NOT AT ALL
3. TROUBLE FALLING OR STAYING ASLEEP: NOT AT ALL
2. FEELING DOWN, DEPRESSED OR HOPELESS: NOT AT ALL
6. FEELING BAD ABOUT YOURSELF - OR THAT YOU ARE A FAILURE OR HAVE LET YOURSELF OR YOUR FAMILY DOWN: NOT AT ALL
SUM OF ALL RESPONSES TO PHQ QUESTIONS 1-9: 1

## 2025-04-21 ASSESSMENT — LIFESTYLE VARIABLES
HOW MANY STANDARD DRINKS CONTAINING ALCOHOL DO YOU HAVE ON A TYPICAL DAY: PATIENT DOES NOT DRINK
HOW OFTEN DO YOU HAVE A DRINK CONTAINING ALCOHOL: NEVER

## 2025-04-21 NOTE — PROGRESS NOTES
Medicare Annual Wellness Visit    Marina Morales is here for Medicare AWV    Assessment & Plan   Initial Medicare annual wellness visit    Referral [facility].   Celexa 20mg daily.   Great family support - monitor closely.     Vertigo  -     Non [facility] - External Referral To Home Health    Pulmonary hypertension, unspecified (HCC)    Paroxysmal atrial fibrillation (HCC)    Age-related osteoporosis without current pathological fracture    Declines any further work-up of dizziness besides C at this time. F/u if no improvement.        Return in about 1 year (around 4/21/2026) for Medicare wellness.     Subjective     Diarrhea. Colonoscopy, EGD (11/2023). She has decreased vegetables/fruits. She denies concerns and denies diarrhea today.      Dx acute DVT, 10/2023   Acute totally occluding deep vein thrombosis involving a left gastrocnemius   vein.   Acute totally occluding deep vein thrombosis involving a left soleal vein.   No other evidence of deep or superficial venous thrombosis involving the   left lower extremity and the contralateral proximal common femoral vein.     Reviewed with Vascular appointment. Recommended compression socks.      PAF. New onset 2023 - declines cardiology.   Maintains Eliquis. Denies any falls.      Hx fx L hip.     Dizziness is present \"most days\". She performs exercises at wake and bedtime - sometimes this worsens symptoms and sometimes not.     Sister --> moved to a memory area of the nursing home and now harder to see her.     Patient's complete Health Risk Assessment and screening values have been reviewed and are found in Flowsheets. The following problems were reviewed today and where indicated follow up appointments were made and/or referrals ordered.    Positive Risk Factor Screenings with Interventions:    Fall Risk:  Do you feel unsteady or are you worried about falling? : (!) yes  2 or more falls in past year?: no  Fall with injury in past year?: no     Interventions:    Reviewed

## 2025-05-28 RX ORDER — POTASSIUM CHLORIDE 1500 MG/1
20 TABLET, EXTENDED RELEASE ORAL DAILY
Qty: 30 TABLET | Refills: 11 | Status: SHIPPED | OUTPATIENT
Start: 2025-05-28

## 2025-05-28 NOTE — TELEPHONE ENCOUNTER
Refill request for POTASSIUM medication.     Name of Pharmacy- [facility]      Last visit - 4/21/25     Pending visit - 4/22/26    Last refill -5/1/25      Medication Contract signed -   Last Oarrs ran-         Additional Comments

## 2025-06-16 DIAGNOSIS — R25.1 TREMOR: ICD-10-CM

## 2025-06-16 RX ORDER — PROPRANOLOL HYDROCHLORIDE 10 MG/1
TABLET ORAL
Qty: 180 TABLET | Refills: 1 | Status: SHIPPED | OUTPATIENT
Start: 2025-06-16

## 2025-06-16 NOTE — TELEPHONE ENCOUNTER
Refill request for  propranolol (INDERAL) 10 MG tablet medication.     Name of Pharmacy-  [facility]/pharmacy #[id number] - [place], [place] - [address]      Last visit - 4/21/25     Pending visit - 4/22/26    Last refill -9/20/24

## 2025-08-20 RX ORDER — POTASSIUM CHLORIDE 1500 MG/1
20 TABLET, EXTENDED RELEASE ORAL DAILY
Qty: 90 TABLET | Refills: 4 | Status: SHIPPED | OUTPATIENT
Start: 2025-08-20

## (undated) DEVICE — 3M™ STERI-DRAPE™ U-DRAPE 1015: Brand: STERI-DRAPE™

## (undated) DEVICE — STAPLER EXT SKIN 35 WIDE S STL STPL SQUEEZE HNDL VISISTAT

## (undated) DEVICE — FORCEPS BX L240CM JAW DIA2.8MM L CAP W/ NDL MIC MESH TOOTH

## (undated) DEVICE — OPTIFOAM GENTLE SA, POSTOP, 4X8: Brand: MEDLINE

## (undated) DEVICE — DRESSING FOAM W4XL4IN SIL FACE BORD ADH PD SUP ABSRB COR

## (undated) DEVICE — SUTURE VCRL + SZ 2-0 L18IN ABSRB UD CT1 L36MM 1/2 CIR VCP839D

## (undated) DEVICE — SUTURE VCRL SZ 0 L36IN ABSRB UD CT-1 L36MM 1/2 CIR TAPR PNT VCP946H

## (undated) DEVICE — PADDING CAST N ADH 12X6 IN CRIMPED FINISH 100% COTTON WBRLII

## (undated) DEVICE — BIT DRL L145MM DIA4.2MM NONSTERILE 3 FLUT NDL PNT QUIK CPL

## (undated) DEVICE — ELECTRODE,ECG,STRESS,FOAM,3PK: Brand: MEDLINE

## (undated) DEVICE — COTTON UNDERCAST PADDING,CRIMPED FINISH: Brand: WEBRIL

## (undated) DEVICE — DRAPE C ARM UNIV W41XL74IN CLR PLAS XR VELC CLSR POLY STRP

## (undated) DEVICE — DRESSING FOAM W3XL3IN GENTLE SIL FACE BORD ADH PD SUP ABSRB

## (undated) DEVICE — SYSTEM SKIN CLSR 22CM DERMBND PRINEO

## (undated) DEVICE — GLOVE SURG SZ 75 L12IN FNGR THK79MIL GRN LTX FREE

## (undated) DEVICE — CANNULA NSL AD TBNG L7FT PVC STR NONFLARED PRNG O2 DEL W STD

## (undated) DEVICE — SUTURE MCRYL SZ 3-0 L27IN ABSRB UD PS-2 3/8 CIR REV CUT NDL MCP427H

## (undated) DEVICE — SHEET,DRAPE,53X77,STERILE: Brand: MEDLINE

## (undated) DEVICE — GUIDEWIRE ORTH L400MM DIA3.2MM FOR TFN

## (undated) DEVICE — STANDARD HYPODERMIC NEEDLE,POLYPROPYLENE HUB: Brand: MONOJECT

## (undated) DEVICE — SUTURE VCRL + SZ 3-0 L18IN ABSRB VLT L36MM CT-1 1/2 CIR VCP738D

## (undated) DEVICE — SUTURE VCRL + SZ 0 L18IN ABSRB UD L36MM CT-1 1/2 CIR VCP840D

## (undated) DEVICE — CUFF RESTRN WR OR ANK BLU FOAM AD

## (undated) DEVICE — PACK PROCEDURE SURG HIP PIN TROCHANTERIC FEM NAIL CDS

## (undated) DEVICE — ENDOSCOPIC KIT 2 12 FT OP4 DE2 GWN SYR

## (undated) DEVICE — 3M™ COBAN™ NL STERILE NON-LATEX SELF-ADHERENT WRAP, 2084S, 4 IN X 5 YD (10 CM X 4,5 M), 18 ROLLS/CASE: Brand: 3M™ COBAN™

## (undated) DEVICE — SOLUTION IV IRRIG 500ML 0.9% SODIUM CHL 2F7123

## (undated) DEVICE — MEPILEX AG 4X4 5BX

## (undated) DEVICE — SYRINGE MED 10ML LUERLOCK TIP W/O SFTY DISP

## (undated) DEVICE — GLOVE ORANGE PI 7 1/2   MSG9075

## (undated) DEVICE — SMARTGOWN BREATHABLE SURGICAL GOWN: Brand: CONVERTORS

## (undated) DEVICE — BIT DRL L300MM DIA10MM CANN TAPR L QUIK CPL FOR DH DC TFN

## (undated) DEVICE — ROD RMR L950MM DIA3MM W/ STR BALL TIP

## (undated) DEVICE — SUTURE VCRL + SZ 3-0 L18IN ABSRB UD SH 1/2 CIR TAPERCUT NDL VCP864D